# Patient Record
Sex: MALE | Race: WHITE | NOT HISPANIC OR LATINO | Employment: FULL TIME | URBAN - METROPOLITAN AREA
[De-identification: names, ages, dates, MRNs, and addresses within clinical notes are randomized per-mention and may not be internally consistent; named-entity substitution may affect disease eponyms.]

---

## 2023-11-25 ENCOUNTER — HOSPITAL ENCOUNTER (INPATIENT)
Facility: HOSPITAL | Age: 33
LOS: 3 days | Discharge: HOME | DRG: 378 | End: 2023-11-28
Attending: EMERGENCY MEDICINE | Admitting: INTERNAL MEDICINE

## 2023-11-25 DIAGNOSIS — K92.2 UPPER GI BLEED: Primary | ICD-10-CM

## 2023-11-25 DIAGNOSIS — R06.02 SHORTNESS OF BREATH: ICD-10-CM

## 2023-11-25 DIAGNOSIS — D64.9 ANEMIA, UNSPECIFIED TYPE: ICD-10-CM

## 2023-11-25 DIAGNOSIS — R00.0 TACHYCARDIA: ICD-10-CM

## 2023-11-25 DIAGNOSIS — D62 ACUTE BLOOD LOSS ANEMIA: ICD-10-CM

## 2023-11-25 LAB
ABO GROUP (TYPE) IN BLOOD: NORMAL
ABO GROUP (TYPE) IN BLOOD: NORMAL
ALBUMIN SERPL BCP-MCNC: 4.1 G/DL (ref 3.4–5)
ALP SERPL-CCNC: 45 U/L (ref 33–120)
ALT SERPL W P-5'-P-CCNC: 29 U/L (ref 10–52)
ANION GAP SERPL CALC-SCNC: 14 MMOL/L (ref 10–20)
ANTIBODY SCREEN: NORMAL
AST SERPL W P-5'-P-CCNC: 10 U/L (ref 9–39)
BILIRUB SERPL-MCNC: 0.3 MG/DL (ref 0–1.2)
BLOOD EXPIRATION DATE: NORMAL
BUN SERPL-MCNC: 13 MG/DL (ref 6–23)
CALCIUM SERPL-MCNC: 9.1 MG/DL (ref 8.6–10.3)
CARDIAC TROPONIN I PNL SERPL HS: 11 NG/L (ref 0–20)
CHLORIDE SERPL-SCNC: 103 MMOL/L (ref 98–107)
CO2 SERPL-SCNC: 21 MMOL/L (ref 21–32)
CREAT SERPL-MCNC: 0.96 MG/DL (ref 0.5–1.3)
DISPENSE STATUS: NORMAL
GFR SERPL CREATININE-BSD FRML MDRD: >90 ML/MIN/1.73M*2
GLUCOSE BLD MANUAL STRIP-MCNC: 123 MG/DL (ref 74–99)
GLUCOSE SERPL-MCNC: 121 MG/DL (ref 74–99)
INR PPP: 1.2 (ref 0.9–1.1)
LIPASE SERPL-CCNC: 23 U/L (ref 9–82)
POTASSIUM SERPL-SCNC: 4.2 MMOL/L (ref 3.5–5.3)
PRODUCT BLOOD TYPE: 5100
PRODUCT CODE: NORMAL
PROT SERPL-MCNC: 6 G/DL (ref 6.4–8.2)
PROTHROMBIN TIME: 13.1 SECONDS (ref 9.8–12.8)
RH FACTOR (ANTIGEN D): NORMAL
RH FACTOR (ANTIGEN D): NORMAL
SODIUM SERPL-SCNC: 134 MMOL/L (ref 136–145)
TSH SERPL-ACNC: 1.58 MIU/L (ref 0.44–3.98)
UNIT ABO: NORMAL
UNIT NUMBER: NORMAL
UNIT RH: NORMAL
UNIT VOLUME: 350
XM INTEP: NORMAL

## 2023-11-25 PROCEDURE — 84443 ASSAY THYROID STIM HORMONE: CPT | Performed by: EMERGENCY MEDICINE

## 2023-11-25 PROCEDURE — 83690 ASSAY OF LIPASE: CPT | Performed by: EMERGENCY MEDICINE

## 2023-11-25 PROCEDURE — 2500000004 HC RX 250 GENERAL PHARMACY W/ HCPCS (ALT 636 FOR OP/ED): Performed by: EMERGENCY MEDICINE

## 2023-11-25 PROCEDURE — 36415 COLL VENOUS BLD VENIPUNCTURE: CPT | Performed by: EMERGENCY MEDICINE

## 2023-11-25 PROCEDURE — 80053 COMPREHEN METABOLIC PANEL: CPT | Performed by: EMERGENCY MEDICINE

## 2023-11-25 PROCEDURE — C9113 INJ PANTOPRAZOLE SODIUM, VIA: HCPCS | Performed by: EMERGENCY MEDICINE

## 2023-11-25 PROCEDURE — 96374 THER/PROPH/DIAG INJ IV PUSH: CPT

## 2023-11-25 PROCEDURE — 85610 PROTHROMBIN TIME: CPT | Performed by: EMERGENCY MEDICINE

## 2023-11-25 PROCEDURE — 82947 ASSAY GLUCOSE BLOOD QUANT: CPT

## 2023-11-25 PROCEDURE — 86901 BLOOD TYPING SEROLOGIC RH(D): CPT | Performed by: EMERGENCY MEDICINE

## 2023-11-25 PROCEDURE — 84484 ASSAY OF TROPONIN QUANT: CPT | Performed by: EMERGENCY MEDICINE

## 2023-11-25 PROCEDURE — 36430 TRANSFUSION BLD/BLD COMPNT: CPT

## 2023-11-25 PROCEDURE — 85060 BLOOD SMEAR INTERPRETATION: CPT | Performed by: EMERGENCY MEDICINE

## 2023-11-25 PROCEDURE — 99291 CRITICAL CARE FIRST HOUR: CPT | Mod: 25 | Performed by: EMERGENCY MEDICINE

## 2023-11-25 PROCEDURE — 86920 COMPATIBILITY TEST SPIN: CPT

## 2023-11-25 PROCEDURE — 99291 CRITICAL CARE FIRST HOUR: CPT | Performed by: EMERGENCY MEDICINE

## 2023-11-25 PROCEDURE — 85027 COMPLETE CBC AUTOMATED: CPT | Performed by: EMERGENCY MEDICINE

## 2023-11-25 PROCEDURE — P9016 RBC LEUKOCYTES REDUCED: HCPCS

## 2023-11-25 PROCEDURE — 2020000001 HC ICU ROOM DAILY

## 2023-11-25 RX ORDER — POTASSIUM CHLORIDE 14.9 MG/ML
20 INJECTION INTRAVENOUS EVERY 6 HOURS PRN
Status: DISCONTINUED | OUTPATIENT
Start: 2023-11-25 | End: 2023-11-27

## 2023-11-25 RX ORDER — MAGNESIUM SULFATE HEPTAHYDRATE 40 MG/ML
4 INJECTION, SOLUTION INTRAVENOUS EVERY 6 HOURS PRN
Status: DISCONTINUED | OUTPATIENT
Start: 2023-11-25 | End: 2023-11-27

## 2023-11-25 RX ORDER — POTASSIUM CHLORIDE 1.5 G/1.58G
40 POWDER, FOR SOLUTION ORAL EVERY 6 HOURS PRN
Status: DISCONTINUED | OUTPATIENT
Start: 2023-11-25 | End: 2023-11-27

## 2023-11-25 RX ORDER — POTASSIUM CHLORIDE 1.5 G/1.58G
20 POWDER, FOR SOLUTION ORAL EVERY 6 HOURS PRN
Status: DISCONTINUED | OUTPATIENT
Start: 2023-11-25 | End: 2023-11-27

## 2023-11-25 RX ORDER — PANTOPRAZOLE SODIUM 40 MG/10ML
40 INJECTION, POWDER, LYOPHILIZED, FOR SOLUTION INTRAVENOUS 2 TIMES DAILY
Status: DISCONTINUED | OUTPATIENT
Start: 2023-11-25 | End: 2023-11-27

## 2023-11-25 RX ORDER — PANTOPRAZOLE SODIUM 40 MG/10ML
80 INJECTION, POWDER, LYOPHILIZED, FOR SOLUTION INTRAVENOUS ONCE
Status: COMPLETED | OUTPATIENT
Start: 2023-11-25 | End: 2023-11-25

## 2023-11-25 RX ORDER — MAGNESIUM SULFATE HEPTAHYDRATE 40 MG/ML
2 INJECTION, SOLUTION INTRAVENOUS EVERY 6 HOURS PRN
Status: DISCONTINUED | OUTPATIENT
Start: 2023-11-25 | End: 2023-11-27

## 2023-11-25 RX ORDER — POTASSIUM CHLORIDE 20 MEQ/1
40 TABLET, EXTENDED RELEASE ORAL EVERY 6 HOURS PRN
Status: DISCONTINUED | OUTPATIENT
Start: 2023-11-25 | End: 2023-11-27

## 2023-11-25 RX ORDER — POTASSIUM CHLORIDE 20 MEQ/1
20 TABLET, EXTENDED RELEASE ORAL EVERY 6 HOURS PRN
Status: DISCONTINUED | OUTPATIENT
Start: 2023-11-25 | End: 2023-11-27

## 2023-11-25 RX ADMIN — SODIUM CHLORIDE 1000 ML: 9 INJECTION, SOLUTION INTRAVENOUS at 18:33

## 2023-11-25 RX ADMIN — PANTOPRAZOLE SODIUM 80 MG: 40 INJECTION, POWDER, FOR SOLUTION INTRAVENOUS at 18:34

## 2023-11-25 SDOH — SOCIAL STABILITY: SOCIAL INSECURITY: DOES ANYONE TRY TO KEEP YOU FROM HAVING/CONTACTING OTHER FRIENDS OR DOING THINGS OUTSIDE YOUR HOME?: NO

## 2023-11-25 SDOH — SOCIAL STABILITY: SOCIAL INSECURITY: WERE YOU ABLE TO COMPLETE ALL THE BEHAVIORAL HEALTH SCREENINGS?: YES

## 2023-11-25 SDOH — SOCIAL STABILITY: SOCIAL INSECURITY: ABUSE: ADULT

## 2023-11-25 SDOH — SOCIAL STABILITY: SOCIAL INSECURITY: ARE THERE ANY APPARENT SIGNS OF INJURIES/BEHAVIORS THAT COULD BE RELATED TO ABUSE/NEGLECT?: NO

## 2023-11-25 SDOH — SOCIAL STABILITY: SOCIAL INSECURITY: HAS ANYONE EVER THREATENED TO HURT YOUR FAMILY OR YOUR PETS?: NO

## 2023-11-25 SDOH — SOCIAL STABILITY: SOCIAL INSECURITY: DO YOU FEEL UNSAFE GOING BACK TO THE PLACE WHERE YOU ARE LIVING?: NO

## 2023-11-25 SDOH — SOCIAL STABILITY: SOCIAL INSECURITY: DO YOU FEEL ANYONE HAS EXPLOITED OR TAKEN ADVANTAGE OF YOU FINANCIALLY OR OF YOUR PERSONAL PROPERTY?: NO

## 2023-11-25 SDOH — SOCIAL STABILITY: SOCIAL INSECURITY: ARE YOU OR HAVE YOU BEEN THREATENED OR ABUSED PHYSICALLY, EMOTIONALLY, OR SEXUALLY BY ANYONE?: NO

## 2023-11-25 SDOH — SOCIAL STABILITY: SOCIAL INSECURITY: HAVE YOU HAD THOUGHTS OF HARMING ANYONE ELSE?: NO

## 2023-11-25 ASSESSMENT — LIFESTYLE VARIABLES
HAVE YOU EVER FELT YOU SHOULD CUT DOWN ON YOUR DRINKING: NO
PRESCIPTION_ABUSE_PAST_12_MONTHS: NO
HAVE PEOPLE ANNOYED YOU BY CRITICIZING YOUR DRINKING: NO
HOW MANY STANDARD DRINKS CONTAINING ALCOHOL DO YOU HAVE ON A TYPICAL DAY: PATIENT DOES NOT DRINK
EVER FELT BAD OR GUILTY ABOUT YOUR DRINKING: NO
HOW OFTEN DO YOU HAVE A DRINK CONTAINING ALCOHOL: NEVER
AUDIT-C TOTAL SCORE: 0
AUDIT-C TOTAL SCORE: 0
HOW OFTEN DO YOU HAVE 6 OR MORE DRINKS ON ONE OCCASION: NEVER
EVER HAD A DRINK FIRST THING IN THE MORNING TO STEADY YOUR NERVES TO GET RID OF A HANGOVER: NO
SKIP TO QUESTIONS 9-10: 1
SUBSTANCE_ABUSE_PAST_12_MONTHS: NO
REASON UNABLE TO ASSESS: NO

## 2023-11-25 ASSESSMENT — PATIENT HEALTH QUESTIONNAIRE - PHQ9
2. FEELING DOWN, DEPRESSED OR HOPELESS: NOT AT ALL
1. LITTLE INTEREST OR PLEASURE IN DOING THINGS: NEARLY EVERY DAY
SUM OF ALL RESPONSES TO PHQ9 QUESTIONS 1 & 2: 3

## 2023-11-25 ASSESSMENT — COGNITIVE AND FUNCTIONAL STATUS - GENERAL
PATIENT BASELINE BEDBOUND: NO
MOBILITY SCORE: 24
DAILY ACTIVITIY SCORE: 24

## 2023-11-25 ASSESSMENT — ACTIVITIES OF DAILY LIVING (ADL)
BATHING: INDEPENDENT
ADEQUATE_TO_COMPLETE_ADL: YES
GROOMING: INDEPENDENT
HEARING - LEFT EAR: FUNCTIONAL
PATIENT'S MEMORY ADEQUATE TO SAFELY COMPLETE DAILY ACTIVITIES?: YES
WALKS IN HOME: INDEPENDENT
TOILETING: INDEPENDENT
DRESSING YOURSELF: INDEPENDENT
JUDGMENT_ADEQUATE_SAFELY_COMPLETE_DAILY_ACTIVITIES: YES
HEARING - RIGHT EAR: FUNCTIONAL
FEEDING YOURSELF: INDEPENDENT
LACK_OF_TRANSPORTATION: NO

## 2023-11-25 ASSESSMENT — PAIN - FUNCTIONAL ASSESSMENT
PAIN_FUNCTIONAL_ASSESSMENT: 0-10
PAIN_FUNCTIONAL_ASSESSMENT: 0-10

## 2023-11-25 ASSESSMENT — COLUMBIA-SUICIDE SEVERITY RATING SCALE - C-SSRS
6. HAVE YOU EVER DONE ANYTHING, STARTED TO DO ANYTHING, OR PREPARED TO DO ANYTHING TO END YOUR LIFE?: NO
1. IN THE PAST MONTH, HAVE YOU WISHED YOU WERE DEAD OR WISHED YOU COULD GO TO SLEEP AND NOT WAKE UP?: NO
2. HAVE YOU ACTUALLY HAD ANY THOUGHTS OF KILLING YOURSELF?: NO

## 2023-11-25 ASSESSMENT — PAIN SCALES - GENERAL
PAINLEVEL_OUTOF10: 0 - NO PAIN
PAINLEVEL_OUTOF10: 0 - NO PAIN

## 2023-11-25 NOTE — ED TRIAGE NOTES
Patient states shortness of breth and fatigue started month ago. Pt  denies vomtiing, diarrhea, or fevers.

## 2023-11-25 NOTE — ED PROVIDER NOTES
HPI   Chief Complaint   Patient presents with    Shortness of Breath       This is a 33-year-old male that presents the emergency room with increased weakness and shortness of breath x1 month.  Patient states that normally is able to go to the gym and workout for 2 hours over the past month, has been dropping less and less because of shortness of breath and fatigue.  He states now when he wakes up in the morning, he tries to walk around to get ready which generally takes him a 15-minute will take him over 45 minutes to an hour except to stop pause and rest in between dressing and walking to the bathroom.  As well as walking to the kitchen.  He tried to go to the gym today and got extremely lightheaded, and ended up here in the emergency room.  The patient is very pale and states has been getting more pale over the past few weeks, and he feels his heart rate pounding really fast in his chest.  He denies any chest pain but does can state that has been having indigestion for the past month and using Pepto-Bismol and his stool has been black and he thought that was secondary to the Pepto-Bismol.  He has no abdominal pain otherwise, no history of ulcers, and no medical problems.  He is drinking a lot of water, and peeing a lot but that is because he is always thirsty.  He has had no weight loss with this thirst.  His dad did have type 2 diabetes.  He denies any headaches or dizziness.  No back pain, no abdominal pain at this time.    Physical history: None  Medications: Pepto-Bismol                          [place] Coma Scale Score: 15                  Patient History   No past medical history on file.  No past surgical history on file.  No family history on file.  Social History     Tobacco Use    Smoking status: Not on file    Smokeless tobacco: Not on file   Substance Use Topics    Alcohol use: Not on file    Drug use: Not on file       Physical Exam   ED Triage Vitals [11/25/23 1744]   Temp Heart Rate Resp BP   36.5  °C (97.7 °F) (!) 144 18 (!) 125/6      SpO2 Temp src Heart Rate Source Patient Position   97 % -- Monitor Sitting      BP Location FiO2 (%)     Right arm --       Physical Exam  Constitutional:       Appearance: He is normal weight.      Comments: Patient appears pale and tachycardic with normal effort of breathing.  No clue small respirations.   HENT:      Head: Normocephalic.   Eyes:      Pupils: Pupils are equal, round, and reactive to light.      Comments: Pale conjunctiva   Cardiovascular:      Rate and Rhythm: Tachycardia present.   Pulmonary:      Effort: Pulmonary effort is normal.      Breath sounds: Normal breath sounds.   Abdominal:      General: Bowel sounds are normal.      Palpations: Abdomen is soft.   Musculoskeletal:         General: Normal range of motion.   Skin:     General: Skin is warm.   Neurological:      General: No focal deficit present.      Mental Status: He is alert.         ED Course & MDM   Diagnoses as of 11/25/23 2007   Upper GI bleed   Acute blood loss anemia   Tachycardia       Medical Decision Making  The Accu-Chek at bedside is 124, therefore this is not uncontrolled diabetes.  Working diagnosis is most likely acute blood loss anemia causing fatigue and shortness of breath and the pale skin discoloration.  The patient has consented to receiving blood, has no Mandaeism issues with receiving blood, and understands the risk factors associated with blood transfusion including pulmonary edema, and infection.    The patient had verbal consent at bedside for blood transfusion, and we obtained electronic consent to.  At a phone call from the lab stating his hemoglobin was 2.3.  Called the blood bank for type and cross for 3 units.  The patient has again consented knowing the risks of blood transfusion including pulmonary edema, and infection as well as transfusion reaction.  He received the first unit of blood and tolerated well without decrease in heart rate from 145, to 125.  He does  feel little bit cold, the blood warmer will be used for the next 2 units of blood.  Each will be infused over 30 minutes.    Discussed with Dr. Bishop, GI on-call recommends further blood transfusion which will be performed in the inpatient side, and any any cause for any change in condition otherwise the plan will be for an EGD.  Agrees with the IV Protonix.    Discussed with Dr. Alexander, critical care, admitted to the ICU.          Procedure  Critical Care    Performed by: Alessandro Russell DO  Authorized by: Alessandro Russell DO    Critical care provider statement:     Critical care time (minutes):  40  Comments:      Critical care time for 40 minutes for complex but decision making, stabilization resuscitation of patient with tachycardia and the onset of acute blood loss anemia with transfusion of 3 units of blood and admission to the ICU as well as discussion with the GI specialist.       Alessandro Russell DO  11/25/23 2009

## 2023-11-25 NOTE — Clinical Note
Pt axox4, heart rate regular sr-st, lungs clear throughout except slight crackles in lower left base. Left AC IV flushed and patent

## 2023-11-25 NOTE — Clinical Note
Patient tolerated the procedure well and is comfortable with no complaints of pain. Vital signs stable. Arousable prior to transport. Patient transported to icu via stretcher. Handoff completed.

## 2023-11-26 ENCOUNTER — APPOINTMENT (OUTPATIENT)
Dept: RADIOLOGY | Facility: HOSPITAL | Age: 33
DRG: 378 | End: 2023-11-26

## 2023-11-26 ENCOUNTER — ANESTHESIA EVENT (OUTPATIENT)
Dept: GASTROENTEROLOGY | Facility: HOSPITAL | Age: 33
DRG: 378 | End: 2023-11-26

## 2023-11-26 ENCOUNTER — APPOINTMENT (OUTPATIENT)
Dept: GASTROENTEROLOGY | Facility: HOSPITAL | Age: 33
DRG: 378 | End: 2023-11-26

## 2023-11-26 ENCOUNTER — ANESTHESIA (OUTPATIENT)
Dept: GASTROENTEROLOGY | Facility: HOSPITAL | Age: 33
DRG: 378 | End: 2023-11-26

## 2023-11-26 PROBLEM — D64.9 ANEMIA: Status: ACTIVE | Noted: 2023-11-26

## 2023-11-26 PROBLEM — R06.02 SHORTNESS OF BREATH: Status: ACTIVE | Noted: 2023-11-26

## 2023-11-26 LAB
ALBUMIN SERPL BCP-MCNC: 3.6 G/DL (ref 3.4–5)
ALP SERPL-CCNC: 37 U/L (ref 33–120)
ALT SERPL W P-5'-P-CCNC: 21 U/L (ref 10–52)
ANION GAP SERPL CALC-SCNC: 11 MMOL/L (ref 10–20)
AST SERPL W P-5'-P-CCNC: 9 U/L (ref 9–39)
BILIRUB SERPL-MCNC: 0.9 MG/DL (ref 0–1.2)
BLOOD EXPIRATION DATE: NORMAL
BLOOD EXPIRATION DATE: NORMAL
BUN SERPL-MCNC: 9 MG/DL (ref 6–23)
CALCIUM SERPL-MCNC: 8.6 MG/DL (ref 8.6–10.3)
CHLORIDE SERPL-SCNC: 107 MMOL/L (ref 98–107)
CO2 SERPL-SCNC: 23 MMOL/L (ref 21–32)
CREAT SERPL-MCNC: 0.86 MG/DL (ref 0.5–1.3)
DACRYOCYTES BLD QL SMEAR: NORMAL
DISPENSE STATUS: NORMAL
DISPENSE STATUS: NORMAL
ERYTHROCYTE [DISTWIDTH] IN BLOOD BY AUTOMATED COUNT: 23.5 % (ref 11.5–14.5)
ERYTHROCYTE [DISTWIDTH] IN BLOOD BY AUTOMATED COUNT: 27.3 % (ref 11.5–14.5)
ERYTHROCYTE [DISTWIDTH] IN BLOOD BY AUTOMATED COUNT: 27.3 % (ref 11.5–14.5)
GFR SERPL CREATININE-BSD FRML MDRD: >90 ML/MIN/1.73M*2
GLUCOSE SERPL-MCNC: 100 MG/DL (ref 74–99)
HCT VFR BLD AUTO: 17.5 % (ref 41–52)
HCT VFR BLD AUTO: 17.8 % (ref 41–52)
HCT VFR BLD AUTO: 20.3 % (ref 41–52)
HCT VFR BLD AUTO: 26.2 % (ref 41–52)
HCT VFR BLD AUTO: 9.2 % (ref 41–52)
HGB BLD-MCNC: 2.3 G/DL (ref 13.5–17.5)
HGB BLD-MCNC: 5.1 G/DL (ref 13.5–17.5)
HGB BLD-MCNC: 5.2 G/DL (ref 13.5–17.5)
HGB BLD-MCNC: 6.1 G/DL (ref 13.5–17.5)
HGB BLD-MCNC: 7.6 G/DL (ref 13.5–17.5)
HYPOCHROMIA BLD QL SMEAR: NORMAL
MAGNESIUM SERPL-MCNC: 2.24 MG/DL (ref 1.6–2.4)
MCH RBC QN AUTO: 15 PG (ref 26–34)
MCH RBC QN AUTO: 20.6 PG (ref 26–34)
MCH RBC QN AUTO: 21.5 PG (ref 26–34)
MCHC RBC AUTO-ENTMCNC: 25 G/DL (ref 32–36)
MCHC RBC AUTO-ENTMCNC: 28.7 G/DL (ref 32–36)
MCHC RBC AUTO-ENTMCNC: 29.7 G/DL (ref 32–36)
MCV RBC AUTO: 60 FL (ref 80–100)
MCV RBC AUTO: 72 FL (ref 80–100)
MCV RBC AUTO: 72 FL (ref 80–100)
NRBC BLD-RTO: 0.7 /100 WBCS (ref 0–0)
OVALOCYTES BLD QL SMEAR: NORMAL
PLATELET # BLD AUTO: 464 X10*3/UL (ref 150–450)
PLATELET # BLD AUTO: 480 X10*3/UL (ref 150–450)
PLATELET # BLD AUTO: 658 X10*3/UL (ref 150–450)
POLYCHROMASIA BLD QL SMEAR: NORMAL
POTASSIUM SERPL-SCNC: 3.8 MMOL/L (ref 3.5–5.3)
PRODUCT BLOOD TYPE: 5100
PRODUCT BLOOD TYPE: 5100
PRODUCT CODE: NORMAL
PRODUCT CODE: NORMAL
PROT SERPL-MCNC: 5.4 G/DL (ref 6.4–8.2)
RBC # BLD AUTO: 1.53 X10*6/UL (ref 4.5–5.9)
RBC # BLD AUTO: 2.42 X10*6/UL (ref 4.5–5.9)
RBC # BLD AUTO: 2.47 X10*6/UL (ref 4.5–5.9)
RBC MORPH BLD: NORMAL
SCHISTOCYTES BLD QL SMEAR: NORMAL
SODIUM SERPL-SCNC: 137 MMOL/L (ref 136–145)
UNIT ABO: NORMAL
UNIT ABO: NORMAL
UNIT NUMBER: NORMAL
UNIT NUMBER: NORMAL
UNIT RH: NORMAL
UNIT RH: NORMAL
UNIT VOLUME: 350
UNIT VOLUME: 350
WBC # BLD AUTO: 10.7 X10*3/UL (ref 4.4–11.3)
WBC # BLD AUTO: 9 X10*3/UL (ref 4.4–11.3)
WBC # BLD AUTO: 9.1 X10*3/UL (ref 4.4–11.3)
XM INTEP: NORMAL
XM INTEP: NORMAL

## 2023-11-26 PROCEDURE — C9113 INJ PANTOPRAZOLE SODIUM, VIA: HCPCS

## 2023-11-26 PROCEDURE — 85018 HEMOGLOBIN: CPT | Performed by: INTERNAL MEDICINE

## 2023-11-26 PROCEDURE — 2020000001 HC ICU ROOM DAILY

## 2023-11-26 PROCEDURE — 2500000004 HC RX 250 GENERAL PHARMACY W/ HCPCS (ALT 636 FOR OP/ED): Performed by: NURSE PRACTITIONER

## 2023-11-26 PROCEDURE — 2500000001 HC RX 250 WO HCPCS SELF ADMINISTERED DRUGS (ALT 637 FOR MEDICARE OP): Performed by: NURSE PRACTITIONER

## 2023-11-26 PROCEDURE — 43235 EGD DIAGNOSTIC BRUSH WASH: CPT

## 2023-11-26 PROCEDURE — 3E0G8GC INTRODUCTION OF OTHER THERAPEUTIC SUBSTANCE INTO UPPER GI, VIA NATURAL OR ARTIFICIAL OPENING ENDOSCOPIC: ICD-10-PCS | Performed by: INTERNAL MEDICINE

## 2023-11-26 PROCEDURE — 2500000004 HC RX 250 GENERAL PHARMACY W/ HCPCS (ALT 636 FOR OP/ED)

## 2023-11-26 PROCEDURE — 82374 ASSAY BLOOD CARBON DIOXIDE: CPT

## 2023-11-26 PROCEDURE — 85027 COMPLETE CBC AUTOMATED: CPT

## 2023-11-26 PROCEDURE — 2500000004 HC RX 250 GENERAL PHARMACY W/ HCPCS (ALT 636 FOR OP/ED): Performed by: INTERNAL MEDICINE

## 2023-11-26 PROCEDURE — 71045 X-RAY EXAM CHEST 1 VIEW: CPT | Performed by: RADIOLOGY

## 2023-11-26 PROCEDURE — 99222 1ST HOSP IP/OBS MODERATE 55: CPT | Performed by: INTERNAL MEDICINE

## 2023-11-26 PROCEDURE — 3700000002 HC GENERAL ANESTHESIA TIME - EACH INCREMENTAL 1 MINUTE

## 2023-11-26 PROCEDURE — A43239 PR EDG TRANSORAL BIOPSY SINGLE/MULTIPLE: Performed by: NURSE ANESTHETIST, CERTIFIED REGISTERED

## 2023-11-26 PROCEDURE — 71045 X-RAY EXAM CHEST 1 VIEW: CPT | Mod: FY

## 2023-11-26 PROCEDURE — 43235 EGD DIAGNOSTIC BRUSH WASH: CPT | Performed by: INTERNAL MEDICINE

## 2023-11-26 PROCEDURE — 36430 TRANSFUSION BLD/BLD COMPNT: CPT

## 2023-11-26 PROCEDURE — 3700000001 HC GENERAL ANESTHESIA TIME - INITIAL BASE CHARGE

## 2023-11-26 PROCEDURE — 2500000004 HC RX 250 GENERAL PHARMACY W/ HCPCS (ALT 636 FOR OP/ED): Performed by: NURSE ANESTHETIST, CERTIFIED REGISTERED

## 2023-11-26 PROCEDURE — 36415 COLL VENOUS BLD VENIPUNCTURE: CPT

## 2023-11-26 PROCEDURE — 2720000007 HC OR 272 NO HCPCS

## 2023-11-26 PROCEDURE — P9016 RBC LEUKOCYTES REDUCED: HCPCS

## 2023-11-26 PROCEDURE — A43239 PR EDG TRANSORAL BIOPSY SINGLE/MULTIPLE: Performed by: ANESTHESIOLOGY

## 2023-11-26 PROCEDURE — 83735 ASSAY OF MAGNESIUM: CPT

## 2023-11-26 PROCEDURE — 0DJ08ZZ INSPECTION OF UPPER INTESTINAL TRACT, VIA NATURAL OR ARTIFICIAL OPENING ENDOSCOPIC: ICD-10-PCS | Performed by: INTERNAL MEDICINE

## 2023-11-26 PROCEDURE — 99291 CRITICAL CARE FIRST HOUR: CPT | Performed by: INTERNAL MEDICINE

## 2023-11-26 RX ORDER — EPINEPHRINE 0.1 MG/ML
INJECTION INTRACARDIAC; INTRAVENOUS AS NEEDED
Status: COMPLETED | OUTPATIENT
Start: 2023-11-26 | End: 2023-11-26

## 2023-11-26 RX ORDER — FENTANYL CITRATE 50 UG/ML
INJECTION, SOLUTION INTRAMUSCULAR; INTRAVENOUS AS NEEDED
Status: DISCONTINUED | OUTPATIENT
Start: 2023-11-26 | End: 2023-11-26

## 2023-11-26 RX ORDER — FUROSEMIDE 10 MG/ML
20 INJECTION INTRAMUSCULAR; INTRAVENOUS ONCE
Status: DISCONTINUED | OUTPATIENT
Start: 2023-11-26 | End: 2023-11-27

## 2023-11-26 RX ORDER — POLYETHYLENE GLYCOL 3350 17 G/17G
238 POWDER, FOR SOLUTION ORAL ONCE AS NEEDED
Status: COMPLETED | OUTPATIENT
Start: 2023-11-26 | End: 2023-11-26

## 2023-11-26 RX ORDER — MIDAZOLAM HYDROCHLORIDE 1 MG/ML
INJECTION, SOLUTION INTRAMUSCULAR; INTRAVENOUS AS NEEDED
Status: DISCONTINUED | OUTPATIENT
Start: 2023-11-26 | End: 2023-11-26

## 2023-11-26 RX ORDER — FUROSEMIDE 10 MG/ML
40 INJECTION INTRAMUSCULAR; INTRAVENOUS ONCE
Status: DISCONTINUED | OUTPATIENT
Start: 2023-11-26 | End: 2023-11-26

## 2023-11-26 RX ORDER — PROPOFOL 10 MG/ML
INJECTION, EMULSION INTRAVENOUS AS NEEDED
Status: DISCONTINUED | OUTPATIENT
Start: 2023-11-26 | End: 2023-11-26

## 2023-11-26 RX ORDER — PROPOFOL 10 MG/ML
INJECTION, EMULSION INTRAVENOUS CONTINUOUS PRN
Status: DISCONTINUED | OUTPATIENT
Start: 2023-11-26 | End: 2023-11-26

## 2023-11-26 RX ORDER — BISACODYL 5 MG
10 TABLET, DELAYED RELEASE (ENTERIC COATED) ORAL EVERY 8 HOURS
Status: COMPLETED | OUTPATIENT
Start: 2023-11-26 | End: 2023-11-27

## 2023-11-26 RX ADMIN — FENTANYL CITRATE 50 MCG: 50 INJECTION, SOLUTION INTRAMUSCULAR; INTRAVENOUS at 08:18

## 2023-11-26 RX ADMIN — PANTOPRAZOLE SODIUM 40 MG: 40 INJECTION, POWDER, FOR SOLUTION INTRAVENOUS at 20:53

## 2023-11-26 RX ADMIN — PROPOFOL 200 MCG/KG/MIN: 10 INJECTION, EMULSION INTRAVENOUS at 08:18

## 2023-11-26 RX ADMIN — BISACODYL 10 MG: 5 TABLET, COATED ORAL at 17:29

## 2023-11-26 RX ADMIN — POLYETHYLENE GLYCOL 3350 238 G: 17 POWDER, FOR SOLUTION ORAL at 17:29

## 2023-11-26 RX ADMIN — SODIUM CHLORIDE, SODIUM LACTATE, POTASSIUM CHLORIDE, AND CALCIUM CHLORIDE: 600; 310; 30; 20 INJECTION, SOLUTION INTRAVENOUS at 08:05

## 2023-11-26 RX ADMIN — PROPOFOL 20 MG: 10 INJECTION, EMULSION INTRAVENOUS at 08:24

## 2023-11-26 RX ADMIN — PANTOPRAZOLE SODIUM 40 MG: 40 INJECTION, POWDER, FOR SOLUTION INTRAVENOUS at 09:00

## 2023-11-26 RX ADMIN — PROPOFOL 70 MG: 10 INJECTION, EMULSION INTRAVENOUS at 08:18

## 2023-11-26 RX ADMIN — EPINEPHRINE 0.2 MG: 0.1 INJECTION INTRACARDIAC; INTRAVENOUS at 08:29

## 2023-11-26 RX ADMIN — MIDAZOLAM 2 MG: 1 INJECTION INTRAMUSCULAR; INTRAVENOUS at 08:18

## 2023-11-26 SDOH — HEALTH STABILITY: MENTAL HEALTH: CURRENT SMOKER: 0

## 2023-11-26 ASSESSMENT — ENCOUNTER SYMPTOMS
SPEECH DIFFICULTY: 0
NAUSEA: 0
DIARRHEA: 0
EYE DISCHARGE: 0
EYE PAIN: 0
CHEST TIGHTNESS: 1
EYE REDNESS: 0
PALPITATIONS: 0
HALLUCINATIONS: 0
SHORTNESS OF BREATH: 1
VOMITING: 0
FLANK PAIN: 0
SLEEP DISTURBANCE: 0
HEMATURIA: 0
ADENOPATHY: 0
BRUISES/BLEEDS EASILY: 0
LIGHT-HEADEDNESS: 1
DIZZINESS: 0
SEIZURES: 0
BACK PAIN: 1
FEVER: 0
MYALGIAS: 0
VOICE CHANGE: 0
ABDOMINAL PAIN: 0
TROUBLE SWALLOWING: 0
CONSTIPATION: 0
UNEXPECTED WEIGHT CHANGE: 0

## 2023-11-26 ASSESSMENT — COGNITIVE AND FUNCTIONAL STATUS - GENERAL
MOBILITY SCORE: 24
DAILY ACTIVITIY SCORE: 24

## 2023-11-26 ASSESSMENT — PAIN SCALES - GENERAL
PAIN_LEVEL: 0
PAINLEVEL_OUTOF10: 0 - NO PAIN

## 2023-11-26 ASSESSMENT — PAIN - FUNCTIONAL ASSESSMENT: PAIN_FUNCTIONAL_ASSESSMENT: 0-10

## 2023-11-26 NOTE — ANESTHESIA POSTPROCEDURE EVALUATION
Patient: Daryl Win    Procedure Summary       Date: 11/26/23 Room / Location: [facility] [place]    Anesthesia Start: 0813 Anesthesia Stop: 0844    Procedure: EGD Diagnosis:       Upper GI bleed      Acute blood loss anemia    Scheduled Providers:  Responsible Provider: Kingsley Tinsley MD    Anesthesia Type: MAC ASA Status: 3            Anesthesia Type: MAC    Vitals Value Taken Time   BP 93/70 11/26/23 0841   Temp 36.8 11/26/23 0853   Pulse 116 11/26/23 0851   Resp 33 11/26/23 0851   SpO2 91 % 11/26/23 0851   Vitals shown include unvalidated device data.    Anesthesia Post Evaluation    Patient location during evaluation: ICU  Patient participation: complete - patient participated  Level of consciousness: sleepy but conscious and awake  Pain score: 0  Pain management: adequate  Airway patency: patent  Cardiovascular status: acceptable  Respiratory status: face mask and spontaneous ventilation (Tachypneic at baseline for patient)  Hydration status: acceptable  Postoperative Nausea and Vomiting: none  Comments: Handoff to ICU RN>        No notable events documented.

## 2023-11-26 NOTE — CARE PLAN
The patient's goals for the shift include      The clinical goals for the shift include room air    Over the shift, the patient did not make progress toward the following goals. Barriers to progression include ***. Recommendations to address these barriers include ***.

## 2023-11-26 NOTE — CONSULTS
Reason For Consult  Anemia, melena    History Of Present Illness  Daryl Win is a 33 y.o. male presenting with dyspnea on exertion for the past month, followed by melena for approximately 2 weeks.  He also complains of pale skin.  He noticed dyspnea on exertion while at the gym for the past month, and this has progressed gradually to dyspnea with walking across the room.  He does take Pepto-Bismol as needed for heartburn 1-2 times per week.  He reports having heartburn all his life.  He has never taken a prescription for this.  He denies weight loss, nausea, dysphagia and vomiting.  He denies abdominal pain.  He takes Advil as needed for back pain.  He took Advil 3-4 times per day for 2 to 3 days straight recently.  He has never had an EGD or colonoscopy.    His hemoglobin was 2 on admission to the ER.  He was transfused 3 units, and a 4th unit had to be stopped due to pulmonary edema.  His hemoglobin at last check was 5.1.     Past Medical History  None    Surgical History  None     Social History  He reports that he has never smoked. He has never used smokeless tobacco. He reports that he does not drink alcohol and does not use drugs.    Family History  Denies CRC  Father metastatic stomach cancer age 62     Allergies  Patient has no known allergies.    Review of Systems  Review of Systems   Constitutional:  Negative for fever and unexpected weight change.   HENT:  Negative for trouble swallowing and voice change.    Eyes:  Negative for pain, discharge and redness.   Respiratory:  Positive for chest tightness and shortness of breath.    Cardiovascular:  Negative for palpitations and leg swelling.   Gastrointestinal:  Negative for abdominal pain, constipation, diarrhea, nausea and vomiting.   Endocrine: Negative for cold intolerance and heat intolerance.   Genitourinary:  Negative for flank pain and hematuria.   Musculoskeletal:  Positive for back pain. Negative for myalgias.   Skin:  Positive for pallor.  "Negative for rash.   Neurological:  Positive for light-headedness. Negative for dizziness, seizures, syncope and speech difficulty.   Hematological:  Negative for adenopathy. Does not bruise/bleed easily.   Psychiatric/Behavioral:  Negative for hallucinations, sleep disturbance and suicidal ideas.           Physical Exam  General appearance: No acute distress, cooperative, pale.  Eyes: Nonicteric, no redness or proptosis  Ears, nose, mouth, and throat: Moist mucous membranes, tongue normal; no oral lesions; Mallampati 2  Neck: Supple, no lymphadenopathy or thyromegaly  Lungs: Clear to auscultation bilaterally  CV: Regular rate and rhythm, no murmur; no pitting edema in the lower extremities  Abd: soft, non-tender; non-distended; normal active bowel sounds; no scars  Skin: No rashes or lesions; no liver stigmata  MSK: No deformities or joint edema/redness/tenderness  Neuro: Alert and oriented ×3, normal gait       Last Recorded Vitals  Blood pressure 131/75, pulse (!) 114, temperature 37.1 °C (98.8 °F), temperature source Temporal, resp. rate 25, height 1.778 m (5' 10\"), weight 90.2 kg (198 lb 13.7 oz), SpO2 94 %.    Relevant Results    Component      Latest Ref Rng 11/25/2023 11/26/2023   WBC      4.4 - 11.3 x10*3/uL 10.7  9.1    WBC        9.0    nRBC      0.0 - 0.0 /100 WBCs 0.7 (H)  0.7 (H)    nRBC        0.7 (H)    RBC      4.50 - 5.90 x10*6/uL 1.53 (L)  2.47 (L)    RBC        2.42 (L)    HEMOGLOBIN      13.5 - 17.5 g/dL 2.3 (LL)  5.1 (LL)    HEMOGLOBIN        5.2 (LL)    HEMATOCRIT      41.0 - 52.0 % 9.2 (L)  17.8 (L)    HEMATOCRIT        17.5 (L)    MCV      80 - 100 fL 60 (L)  72 (L)    MCV        72 (L)    MCH      26.0 - 34.0 pg 15.0 (L)  20.6 (L)    MCH        21.5 (L)    MCHC      32.0 - 36.0 g/dL 25.0 (L)  28.7 (L)    MCHC        29.7 (L)    RED CELL DISTRIBUTION WIDTH      11.5 - 14.5 % 23.5 (H)  27.3 (H)    RED CELL DISTRIBUTION WIDTH        27.3 (H)    Platelets      150 - 450 x10*3/uL 658 (H)  480 " (H)    Platelets        464 (H)       BUN/Cr 9/1  INR 1.2    CXR: hiatal hernia     Assessment/Plan   33-year-old otherwise healthy male who presents with melena, and profound blood loss anemia likely secondary to peptic ulcer disease.  He is normotensive, but still tachycardic.  Hb 2-> 5 after 3u transfusion.    -agree with PPI bid  -stat h/h  -EGD as soon as can be arranged          Dileep Bishop MD

## 2023-11-26 NOTE — NURSING NOTE
2100 PT ARRIVED FROM ER ALERT AND ORIENTED X4 ON 3L O2 NC, GIB WITH HGB 2.3 SECOND UNIT PRBCS INFUSING.     2249 THIRD UNIT PRBCS STARTED.    HGB RECHECK AFTER THREE UNITS WAS 5.2. POX ON 3L O2 NC DIPS TO 89-91%. O2 INCREASED TO 4L. POX UP TO MID NINETIES. LUNG SOUNDS REMAIN CLEAR. DR ISABEL AWARE.  DR ISABEL ORDERED FOURTH UNIT TO BE GIVEN.     0349 4TH UNIT STARTED AT 100ML/HOUR THEN INCREASED TO 150ML/HR AFTER 15MINUTES.    0600 PT WITH POX 89-90%. DR ISABEL AWARE.     0615 BLOOD STOPPED. CHEST XRAY ORDERED.    0640 DR ISABEL ORDERED LASIX BUT WANTS IT HELD UNTIL OFFICIAL READ COMES BACK.    0650 DR KATE GI IN AND UPDATED ON PT LABS AND BLOOD INFUSIONS. HE SPOKE WITH SHONNA PT AND DR ISABEL.  PT REMAINS ALERT AND ORIENTED AND PLEASANT WITH CARE.

## 2023-11-26 NOTE — ANESTHESIA PREPROCEDURE EVALUATION
Patient: Daryl Win    Procedure Information       Date/Time: 11/26/23 0750    Procedure: EGD    Location: [facility]            Relevant Problems   GI   (+) Upper GI bleed      Hematology   (+) Anemia       Clinical information reviewed:   Tobacco  Allergies  Meds   Med Hx  Surg Hx   Fam Hx  Soc Hx        NPO Detail:  No data recorded     Physical Exam    Airway  Mallampati: II  TM distance: >3 FB  Neck ROM: full     Cardiovascular   Rhythm: regular  Rate: normal     Dental - normal exam     Pulmonary   Breath sounds clear to auscultation     Abdominal            Anesthesia Plan    ASA 3     MAC     The patient is not a current smoker.    intravenous induction   Anesthetic plan and risks discussed with patient.    Plan discussed with CRNA.    Discussed risks benefits and alternatives,patient agrees to proceed.

## 2023-11-26 NOTE — H&P
33-year-old male, otherwise healthy, comes emergency room today for tachycardia, increasing pale skin, shortness of breath on exertion and chest tightness.  He is having symptoms going on like this for a month that are progressively been getting worse.  He said he been having intermittent black stools but was taking Pepto-Bismol intermittently for abdominal discomfort.  He said he is getting more shortness of breath with even less exertion over time and that is why he came in today along with the fact that he is becoming increasingly pale.  No hematemesis.  Currently no nausea or vomiting, fevers or chills.  No chest pain or shortness of breath at rest currently.  No abdominal pain, frequency, urgency, hematuria, dysuria, epistaxis or any other symptoms currently.    ED course: Patient presented to the ER hypertensive, tachycardic.  CBC showed a hemoglobin of 2.3, platelet count of 658, normal white blood cell count.  Chemistry panel was largely unremarkable.  Coagulation studies were within normal limits.  Troponin was normal.  TSH was within normal limits as well.  Patient was given 3 units packed red blood cells, 80 milligrams IV Protonix.  GI was consulted.  Recommended resuscitation.    Past medical history: Denies  Past surgical history: Denies  Allergies: Denies  Social history: No regular drug, tobacco or alcohol use.    Physical Exam:    GENERAL: Well developed, well appearing adult in no acute distress.   NEURO: Alert and oriented. Moves all extremities. Face is symmetric and expressive.   EYES: Pale sclera.  HENT: Normocephalic, atraumatic. Hearing is grossly intact. Nares grossly patent and without discharge. Mucous membranes moist.   NECK: No JVD. Patient moves neck without restriction.   CARDIO: Rhythm regular.  Tachycardic rate. No murmur, rub, or gallop. Pulses equal bilaterally in the upper and lower extremity. No lower extremity edema  PULM: Lungs clear to auscultation in all fields. No wheezes,  rales, or rhonchi. No conversational dyspnea. No splinting, stridor, or accessory muscle use.    GI/: Abdomen is soft and non-tender.  No palpable masses.   EXTREMITIES: Symmetric muscle bulk.  No clubbing, cyanosis, or deformity. No calf tenderness  SKIN: Pale skin  PSYCH: Mood, affect, and interaction is appropriate to the setting.      Assessment:    1.  Chronic blood loss anemia secondary to likely slow GI bleeding      Plan:    Neuro: Maintain standard ICU CAM protocol for ICU delirium.  No other acute neurological intervention required, patient is mentating at his baseline.  He is not anxious, not altered.    Cardio: Patient is tachycardic, will continue resuscitation, keep blood pressure MAP greater than 65.  Resuscitate with blood as needed.  Keep K above 4, mag above 2, Phos above 3.5.    Pulm: No acute intervention required    GI: Patient likely has chronic blood loss anemia secondary to slow GI bleeding.  GI consulted, will see patient in the morning.  Will make patient n.p.o. for possible endoscopy tomorrow.  We will resuscitate blood products as needed.  Will consult GI if patient starts to destabilize despite continuous resuscitation.  Patient given 80 mg IV Protonix in the ER, will order 40 mg twice daily Protonix.     : No acute issues.  We will continue to monitor urine output, electrolytes and kidney function in the morning labs.    Heme: Patient does have a hemoglobin of 2.3, hematocrit 9.2.  Suspect this is likely chronic blood loss anemia.  Platelet count is elevated 658, I do suspect this is likely secondary to hemoconcentration of platelets.  We will transfuse blood as needed after initial 3 units.  We will hold all anticoagulation at this time given patient is having internal bleeding.  We will recheck lab work after transfusion.    Endo: No acute issues    Skin: No acute issues, standard wound care orders per nursing if required    ID: No acute intervention.    Dispo: patient requires  ICU level of care for significant anemia.  CCM will continue to follow.  GI consult in the morning.    Álvaro Vergara DO  PGY-3  Emergency Medicine

## 2023-11-26 NOTE — PROGRESS NOTES
Critical Care Daily Progress        Subjective   Patient is a 33 y.o. male admitted on 11/25/2023  6:08 PM with the following indication(s) for ICU care for Chronic GIB.     Interval History: Patient is a 33-year-old male, otherwise healthy, reported to the emergency room n 11/25 for tachycardia, increasing pale skin, shortness of breath on exertion and chest tightness.  He reported that his symptoms have been going on for a month and have progressively been getting worse.  He said he also had been having intermittent black stools but was taking Pepto-Bismol intermittently for abdominal discomfort.  He said he is getting more shortness of breath with even less exertion over time and that is why he came in today along with the fact that he is becoming increasingly pale.  Upon presenting to the ED, he stated that he had no hematemesis, nausea, vomiting, fevers or chills.  No chest pain or shortness of breath at rest currently.  No abdominal pain, frequency, urgency, hematuria, dysuria, epistaxis. While in the ED, he was noted to the be hypertensive, tachycardiac and his CBC showed a hemoglobin of 2.3. Patient was given 3 units of pRBC, Protonix and transferred to the ICU. A repeat CBC in ICU showed a hemoglobin of 5.1. He was started on an additional transfusion but developed a drop in his oxygen saturation. Transfusion was immediately stopped. A follow up chest x-ray was unremarkable. Patient also underwent an EGD which showed a hiatal hernia and possible alexandre britt tear at the GE junction.     While examining the patient this morning, patient states he has no complaints post procedure. He denies any headaches, vision changes, fevers, chills, chest pain, SOB, abdominal pain, nausea, vomiting or diarrhea.     Complaints: has none..     Scheduled Medications:   bisacodyl, 10 mg, oral, q8h  furosemide, 20 mg, intravenous, Once  pantoprazole, 40 mg, intravenous, BID         Continuous Medications:         PRN  Medications:   PRN medications: EPINEPHrine, magnesium sulfate, magnesium sulfate, potassium chloride CR **OR** potassium chloride, potassium chloride CR **OR** potassium chloride, potassium chloride, potassium chloride    Objective   Vitals:  Most Recent:  Vitals:    11/26/23 1700   BP: 128/86   Pulse: 110   Resp: (!) 35   Temp:    SpO2: 96%       24hr Min/Max:  Temp  Min: 36.6 °C (97.9 °F)  Max: 37.5 °C (99.5 °F)  Pulse  Min: 106  Max: 138  BP  Min: 99/62  Max: 151/74  Resp  Min: 5  Max: 46  SpO2  Min: 89 %  Max: 100 %    LDA:         Vent settings:       Hemodynamic parameters for last 24 hours:       I/O:    Intake/Output Summary (Last 24 hours) at 11/26/2023 1808  Last data filed at 11/26/2023 1700  Gross per 24 hour   Intake 2055.83 ml   Output 4050 ml   Net -1994.17 ml       Physical Exam:     GENERAL: Well developed, well appearing adult in no acute distress.   NEURO: Alert and oriented. Moves all extremities. Face is symmetric and expressive.   EYES: Pale sclera.  HENT: Normocephalic, atraumatic. Hearing is grossly intact. Nares grossly patent and without discharge. Mucous membranes moist.   NECK: No JVD. Patient moves neck without restriction.   CARDIO: Rhythm regular.  Tachycardic rate. No murmur, rub, or gallop. Pulses equal bilaterally in the upper and lower extremity. No lower extremity edema  PULM: Lungs clear to auscultation in all fields. No wheezes, rales, or rhonchi. No conversational dyspnea. No splinting, stridor, or accessory muscle use.    GI/: Abdomen is soft and non-tender.  No palpable masses.   EXTREMITIES: Symmetric muscle bulk.  No clubbing, cyanosis, or deformity. No calf tenderness  SKIN: Pale skin  PSYCH: Mood, affect, and interaction is appropriate to the setting.    Lab/Radiology/Diagnostic Review:  Results for orders placed or performed during the hospital encounter of 11/25/23 (from the past 24 hour(s))   Type and Screen   Result Value Ref Range    ABO TYPE O     Rh TYPE POS      ANTIBODY SCREEN NEG    CBC   Result Value Ref Range    WBC 10.7 4.4 - 11.3 x10*3/uL    nRBC 0.7 (H) 0.0 - 0.0 /100 WBCs    RBC 1.53 (L) 4.50 - 5.90 x10*6/uL    Hemoglobin 2.3 (LL) 13.5 - 17.5 g/dL    Hematocrit 9.2 (L) 41.0 - 52.0 %    MCV 60 (L) 80 - 100 fL    MCH 15.0 (L) 26.0 - 34.0 pg    MCHC 25.0 (L) 32.0 - 36.0 g/dL    RDW 23.5 (H) 11.5 - 14.5 %    Platelets 658 (H) 150 - 450 x10*3/uL   Comprehensive metabolic panel   Result Value Ref Range    Glucose 121 (H) 74 - 99 mg/dL    Sodium 134 (L) 136 - 145 mmol/L    Potassium 4.2 3.5 - 5.3 mmol/L    Chloride 103 98 - 107 mmol/L    Bicarbonate 21 21 - 32 mmol/L    Anion Gap 14 10 - 20 mmol/L    Urea Nitrogen 13 6 - 23 mg/dL    Creatinine 0.96 0.50 - 1.30 mg/dL    eGFR >90 >60 mL/min/1.73m*2    Calcium 9.1 8.6 - 10.3 mg/dL    Albumin 4.1 3.4 - 5.0 g/dL    Alkaline Phosphatase 45 33 - 120 U/L    Total Protein 6.0 (L) 6.4 - 8.2 g/dL    AST 10 9 - 39 U/L    Bilirubin, Total 0.3 0.0 - 1.2 mg/dL    ALT 29 10 - 52 U/L   Lipase   Result Value Ref Range    Lipase 23 9 - 82 U/L   Protime-INR   Result Value Ref Range    Protime 13.1 (H) 9.8 - 12.8 seconds    INR 1.2 (H) 0.9 - 1.1   Troponin I, High Sensitivity   Result Value Ref Range    Troponin I, High Sensitivity 11 0 - 20 ng/L   TSH with reflex to Free T4 if abnormal   Result Value Ref Range    Thyroid Stimulating Hormone 1.58 0.44 - 3.98 mIU/L   Morphology   Result Value Ref Range    RBC Morphology See Below     Polychromasia Mild     Hypochromia Marked     RBC Fragments Many     Ovalocytes Few     Teardrop Cells Few    Prepare RBC: 1 Units   Result Value Ref Range    PRODUCT CODE P6894K95     Unit Number [id number]     Unit ABO O     Unit RH POS     XM INTEP COMP     Dispense Status IS     Blood Expiration Date December 26, 2023 23:59 EST     PRODUCT BLOOD TYPE 5100     UNIT VOLUME 350    VERIFY ABO/Rh Group Test   Result Value Ref Range    ABO TYPE O     Rh TYPE POS    Prepare RBC: 1 Units   Result Value Ref Range     PRODUCT CODE G8580H73     Unit Number [id number]     Unit ABO O     Unit RH POS     XM INTEP COMP     Dispense Status IS     Blood Expiration Date December 25, 2023 23:59 EST     PRODUCT BLOOD TYPE 5100     UNIT VOLUME 350    Prepare RBC: 1 Units   Result Value Ref Range    PRODUCT CODE S7665Z45     Unit Number [id number]     Unit ABO O     Unit RH POS     XM INTEP COMP     Dispense Status TR     Blood Expiration Date December 25, 2023 23:59 EST     PRODUCT BLOOD TYPE 5100     UNIT VOLUME 350    Prepare RBC: 1 Units   Result Value Ref Range    PRODUCT CODE N6309C41     Unit Number [id number]     Unit ABO O     Unit RH POS     XM INTEP COMP     Dispense Status IS     Blood Expiration Date December 18, 2023 23:59 EST     PRODUCT BLOOD TYPE 5100     UNIT VOLUME 350    CBC   Result Value Ref Range    WBC 9.0 4.4 - 11.3 x10*3/uL    nRBC 0.7 (H) 0.0 - 0.0 /100 WBCs    RBC 2.42 (L) 4.50 - 5.90 x10*6/uL    Hemoglobin 5.2 (LL) 13.5 - 17.5 g/dL    Hematocrit 17.5 (L) 41.0 - 52.0 %    MCV 72 (L) 80 - 100 fL    MCH 21.5 (L) 26.0 - 34.0 pg    MCHC 29.7 (L) 32.0 - 36.0 g/dL    RDW 27.3 (H) 11.5 - 14.5 %    Platelets 464 (H) 150 - 450 x10*3/uL   CBC   Result Value Ref Range    WBC 9.1 4.4 - 11.3 x10*3/uL    nRBC 0.7 (H) 0.0 - 0.0 /100 WBCs    RBC 2.47 (L) 4.50 - 5.90 x10*6/uL    Hemoglobin 5.1 (LL) 13.5 - 17.5 g/dL    Hematocrit 17.8 (L) 41.0 - 52.0 %    MCV 72 (L) 80 - 100 fL    MCH 20.6 (L) 26.0 - 34.0 pg    MCHC 28.7 (L) 32.0 - 36.0 g/dL    RDW 27.3 (H) 11.5 - 14.5 %    Platelets 480 (H) 150 - 450 x10*3/uL   Comprehensive metabolic panel   Result Value Ref Range    Glucose 100 (H) 74 - 99 mg/dL    Sodium 137 136 - 145 mmol/L    Potassium 3.8 3.5 - 5.3 mmol/L    Chloride 107 98 - 107 mmol/L    Bicarbonate 23 21 - 32 mmol/L    Anion Gap 11 10 - 20 mmol/L    Urea Nitrogen 9 6 - 23 mg/dL    Creatinine 0.86 0.50 - 1.30 mg/dL    eGFR >90 >60 mL/min/1.73m*2    Calcium 8.6 8.6 - 10.3 mg/dL    Albumin 3.6 3.4 - 5.0  g/dL    Alkaline Phosphatase 37 33 - 120 U/L    Total Protein 5.4 (L) 6.4 - 8.2 g/dL    AST 9 9 - 39 U/L    Bilirubin, Total 0.9 0.0 - 1.2 mg/dL    ALT 21 10 - 52 U/L   Magnesium   Result Value Ref Range    Magnesium 2.24 1.60 - 2.40 mg/dL   Hemoglobin and hematocrit, blood   Result Value Ref Range    Hemoglobin 6.1 (LL) 13.5 - 17.5 g/dL    Hematocrit 20.3 (L) 41.0 - 52.0 %   Prepare RBC: 1 Units   Result Value Ref Range    PRODUCT CODE Z0762C10     Unit Number [id number]     Unit ABO O     Unit RH POS     XM INTEP COMP     Dispense Status TR     Blood Expiration Date December 19, 2023 23:59 EST     PRODUCT BLOOD TYPE 5100     UNIT VOLUME 350    Hemoglobin and Hematocrit   Result Value Ref Range    Hemoglobin 7.6 (L) 13.5 - 17.5 g/dL    Hematocrit 26.2 (L) 41.0 - 52.0 %     EGD    Result Date: 11/26/2023  Table formatting from the original result was not included. Impression The duodenal bulb and 2nd part of the duodenum appeared normal. 10 cm type I hiatal hernia with Bulmaro lesions present Findings The duodenal bulb and 2nd part of the duodenum appeared normal. There were significant Bulmaro lesions in the proximal stomach without bleeding.  The stomach was otherwise normal. 10 cm sliding hiatal hernia (type I hiatal hernia) with Bulmaro lesions present, confirmed by retroflexion Possible Samantha-Rich tear at the GEJ on withdrawal.  There was no bleeding on insertion.  Injected with 1.5 ml of Epi with control of bleeding. Recommendation  Follow up with PCP      Return to [facility] for [facility] care      He will need a colonoscopy for his profound anemia Indication Acute blood loss anemia, Upper GI bleed Post Procedure Diagnosis Large hiatal hernia with Bulmaro lesions Staff Staff Role No Staff Documented Medications See Anesthesia Record. Preprocedure A history and physical has been performed, and patient medication allergies have been reviewed. The patient's tolerance of previous anesthesia has been reviewed.  The risks and benefits of the procedure and the sedation options and risks were discussed with the patient. All questions were answered and informed consent obtained. Details of the Procedure The patient underwent monitored anesthesia care, which was administered by an anesthesia professional. The patient's blood pressure, level of consciousness, oxygen, respirations, heart rate, ECG and ETCO2 were monitored throughout the procedure. The scope was introduced through the mouth and advanced to the second part of the duodenum. Retroflexion was performed in the cardia. The patient's estimated blood loss was minimal (<5 mL). The procedure was not difficult. The patient tolerated the procedure well. There were no apparent adverse events. Events Procedure Events Event Event Time ENDO SCOPE IN TIME 11/26/2023  8:19 AM ENDO SCOPE OUT TIME 11/26/2023  8:28 AM Specimens No specimens collected Procedure Location [facility] [address] [facility] [id number] Referring Provider No referring provider defined for this encounter. Procedure Provider No name on file     XR chest 1 view    Result Date: 11/26/2023  Interpreted By:  Anton Honeycutt, STUDY: XR CHEST 1 VIEW;  11/26/2023 5:56 am   INDICATION: Signs/Symptoms:hypoxia.   COMPARISON: None.   ACCESSION NUMBER(S): [id number]   ORDERING CLINICIAN: MAAME CHANDLER   FINDINGS: AP portable view of the chest is obtained.  Limited exam due to portable nature. Magnified cardiac silhouette. No infiltrates, effusions or pneumothorax. Retrocardiac hiatal hernia.       1.  No evidence of acute cardiopulmonary process. Likely a hiatal hernia.       MACRO: None   Signed by: Anton Honeycutt 11/26/2023 7:20 AM Dictation workstation:   [id number]               Assessment/Plan   Principal Problem:    Upper GI bleed  Active Problems:    Anemia    Shortness of breath    1.  Chronic blood loss anemia secondary to likely slow GI bleeding         Plan:     Neuro: Maintain standard ICU CAM protocol for ICU delirium.  No other acute neurological intervention required, patient is mentating at his baseline.  He is not anxious, not altered.     Cardio: Patient is tachycardic, will continue resuscitation, keep blood pressure MAP greater than 65.  Resuscitate with blood as needed.  Keep K above 4, mag above 2, Phos above 3.5.     Pulm: No acute intervention required     GI: Patient likely has chronic blood loss anemia secondary to slow GI bleeding.    We will resuscitate blood products as needed.  Will consult GI if patient starts to destabilize despite continuous resuscitation.  Patient given 80 mg IV Protonix in the ER, will order 40 mg twice daily Protonix.     EGD completed today shows hiatal hernia and possibly alexandre-britt tear at the GE junction.       : No acute issues.  We will continue to monitor urine output, electrolytes and kidney function in the morning labs.     Heme: Patient has a current hemoglobin of 7.6 , hematocrit 26.2. Significant improvement from early presentation. Patient has been given 4 complete units of pRBC.  We will transfuse blood as needed after initial 4 units.  We will hold all anticoagulation at this time given patient having recent internal bleeding.  We will recheck CBC q12.      Endo: No acute issues     Skin: No acute issues, standard wound care orders per nursing if required     ID: No acute intervention.     Dispo: patient will remain in the ICU until tachycardia resolves and patient is stable. We will continue to monitor H/H and transfuse as needed. Anticipate that the patient will be downgraded in the AM.     The assessment and plan were discussed with the senior resident and attending physician,    Martha Swift MD  Emergency Medicine, PGY-1

## 2023-11-27 ENCOUNTER — APPOINTMENT (OUTPATIENT)
Dept: GASTROENTEROLOGY | Facility: HOSPITAL | Age: 33
DRG: 378 | End: 2023-11-27

## 2023-11-27 ENCOUNTER — APPOINTMENT (OUTPATIENT)
Dept: RADIOLOGY | Facility: HOSPITAL | Age: 33
DRG: 378 | End: 2023-11-27

## 2023-11-27 LAB
ALBUMIN SERPL BCP-MCNC: 4.1 G/DL (ref 3.4–5)
ALP SERPL-CCNC: 47 U/L (ref 33–120)
ALT SERPL W P-5'-P-CCNC: 18 U/L (ref 10–52)
ANION GAP SERPL CALC-SCNC: 12 MMOL/L (ref 10–20)
AST SERPL W P-5'-P-CCNC: 10 U/L (ref 9–39)
BILIRUB SERPL-MCNC: 0.7 MG/DL (ref 0–1.2)
BLOOD EXPIRATION DATE: NORMAL
BLOOD EXPIRATION DATE: NORMAL
BUN SERPL-MCNC: 6 MG/DL (ref 6–23)
CALCIUM SERPL-MCNC: 9.1 MG/DL (ref 8.6–10.3)
CHLORIDE SERPL-SCNC: 106 MMOL/L (ref 98–107)
CO2 SERPL-SCNC: 22 MMOL/L (ref 21–32)
CREAT SERPL-MCNC: 0.88 MG/DL (ref 0.5–1.3)
DISPENSE STATUS: NORMAL
DISPENSE STATUS: NORMAL
ERYTHROCYTE [DISTWIDTH] IN BLOOD BY AUTOMATED COUNT: 25.2 % (ref 11.5–14.5)
GFR SERPL CREATININE-BSD FRML MDRD: >90 ML/MIN/1.73M*2
GLUCOSE SERPL-MCNC: 97 MG/DL (ref 74–99)
HCT VFR BLD AUTO: 27 % (ref 41–52)
HGB BLD-MCNC: 8.3 G/DL (ref 13.5–17.5)
IRON SATN MFR SERPL: 3 % (ref 25–45)
IRON SERPL-MCNC: 14 UG/DL (ref 35–150)
MAGNESIUM SERPL-MCNC: 2.43 MG/DL (ref 1.6–2.4)
MCH RBC QN AUTO: 22.9 PG (ref 26–34)
MCHC RBC AUTO-ENTMCNC: 30.7 G/DL (ref 32–36)
MCV RBC AUTO: 74 FL (ref 80–100)
NRBC BLD-RTO: 1.2 /100 WBCS (ref 0–0)
PATH REVIEW-CBC DIFFERENTIAL: NORMAL
PHOSPHATE SERPL-MCNC: 4 MG/DL (ref 2.5–4.9)
PLATELET # BLD AUTO: 488 X10*3/UL (ref 150–450)
POTASSIUM SERPL-SCNC: 3.3 MMOL/L (ref 3.5–5.3)
PRODUCT BLOOD TYPE: 5100
PRODUCT BLOOD TYPE: 5100
PRODUCT CODE: NORMAL
PRODUCT CODE: NORMAL
PROT SERPL-MCNC: 6.4 G/DL (ref 6.4–8.2)
RBC # BLD AUTO: 3.63 X10*6/UL (ref 4.5–5.9)
SODIUM SERPL-SCNC: 137 MMOL/L (ref 136–145)
TIBC SERPL-MCNC: 435 UG/DL (ref 240–445)
UIBC SERPL-MCNC: 421 UG/DL (ref 110–370)
UNIT ABO: NORMAL
UNIT ABO: NORMAL
UNIT NUMBER: NORMAL
UNIT NUMBER: NORMAL
UNIT RH: NORMAL
UNIT RH: NORMAL
UNIT VOLUME: 350
UNIT VOLUME: 350
VIT B12 SERPL-MCNC: 268 PG/ML (ref 211–911)
WBC # BLD AUTO: 9 X10*3/UL (ref 4.4–11.3)
XM INTEP: NORMAL
XM INTEP: NORMAL

## 2023-11-27 PROCEDURE — 83735 ASSAY OF MAGNESIUM: CPT

## 2023-11-27 PROCEDURE — 83550 IRON BINDING TEST: CPT | Mod: STJLAB | Performed by: INTERNAL MEDICINE

## 2023-11-27 PROCEDURE — 3700000013 HC SEDATION LEVEL 5+ TIME - EACH ADDITIONAL 15 MINUTES: Performed by: INTERNAL MEDICINE

## 2023-11-27 PROCEDURE — 2500000004 HC RX 250 GENERAL PHARMACY W/ HCPCS (ALT 636 FOR OP/ED): Performed by: INTERNAL MEDICINE

## 2023-11-27 PROCEDURE — 86258 DGP ANTIBODY EACH IG CLASS: CPT | Mod: STJLAB | Performed by: NURSE PRACTITIONER

## 2023-11-27 PROCEDURE — 36415 COLL VENOUS BLD VENIPUNCTURE: CPT

## 2023-11-27 PROCEDURE — 36415 COLL VENOUS BLD VENIPUNCTURE: CPT | Performed by: NURSE PRACTITIONER

## 2023-11-27 PROCEDURE — 0DJD8ZZ INSPECTION OF LOWER INTESTINAL TRACT, VIA NATURAL OR ARTIFICIAL OPENING ENDOSCOPIC: ICD-10-PCS | Performed by: INTERNAL MEDICINE

## 2023-11-27 PROCEDURE — 45378 DIAGNOSTIC COLONOSCOPY: CPT

## 2023-11-27 PROCEDURE — 2500000004 HC RX 250 GENERAL PHARMACY W/ HCPCS (ALT 636 FOR OP/ED)

## 2023-11-27 PROCEDURE — 80053 COMPREHEN METABOLIC PANEL: CPT

## 2023-11-27 PROCEDURE — 86364 TISS TRNSGLTMNASE EA IG CLAS: CPT | Mod: STJLAB | Performed by: NURSE PRACTITIONER

## 2023-11-27 PROCEDURE — 3700000012 HC SEDATION LEVEL 5+ TIME - INITIAL 15 MINUTES 5/> YEARS: Performed by: INTERNAL MEDICINE

## 2023-11-27 PROCEDURE — 36415 COLL VENOUS BLD VENIPUNCTURE: CPT | Mod: STJLAB | Performed by: NURSE PRACTITIONER

## 2023-11-27 PROCEDURE — 85027 COMPLETE CBC AUTOMATED: CPT

## 2023-11-27 PROCEDURE — 45378 DIAGNOSTIC COLONOSCOPY: CPT | Performed by: INTERNAL MEDICINE

## 2023-11-27 PROCEDURE — 82607 VITAMIN B-12: CPT | Mod: STJLAB | Performed by: INTERNAL MEDICINE

## 2023-11-27 PROCEDURE — 99152 MOD SED SAME PHYS/QHP 5/>YRS: CPT | Performed by: INTERNAL MEDICINE

## 2023-11-27 PROCEDURE — 71045 X-RAY EXAM CHEST 1 VIEW: CPT | Performed by: RADIOLOGY

## 2023-11-27 PROCEDURE — 2500000004 HC RX 250 GENERAL PHARMACY W/ HCPCS (ALT 636 FOR OP/ED): Performed by: STUDENT IN AN ORGANIZED HEALTH CARE EDUCATION/TRAINING PROGRAM

## 2023-11-27 PROCEDURE — 99233 SBSQ HOSP IP/OBS HIGH 50: CPT | Performed by: STUDENT IN AN ORGANIZED HEALTH CARE EDUCATION/TRAINING PROGRAM

## 2023-11-27 PROCEDURE — 84100 ASSAY OF PHOSPHORUS: CPT | Performed by: NURSE PRACTITIONER

## 2023-11-27 PROCEDURE — 2500000001 HC RX 250 WO HCPCS SELF ADMINISTERED DRUGS (ALT 637 FOR MEDICARE OP): Performed by: NURSE PRACTITIONER

## 2023-11-27 PROCEDURE — 99153 MOD SED SAME PHYS/QHP EA: CPT | Performed by: INTERNAL MEDICINE

## 2023-11-27 PROCEDURE — 82746 ASSAY OF FOLIC ACID SERUM: CPT | Mod: STJLAB | Performed by: INTERNAL MEDICINE

## 2023-11-27 PROCEDURE — 1100000001 HC PRIVATE ROOM DAILY

## 2023-11-27 PROCEDURE — 71045 X-RAY EXAM CHEST 1 VIEW: CPT

## 2023-11-27 PROCEDURE — 99232 SBSQ HOSP IP/OBS MODERATE 35: CPT | Performed by: INTERNAL MEDICINE

## 2023-11-27 PROCEDURE — C9113 INJ PANTOPRAZOLE SODIUM, VIA: HCPCS

## 2023-11-27 RX ORDER — MIDAZOLAM HYDROCHLORIDE 1 MG/ML
INJECTION, SOLUTION INTRAMUSCULAR; INTRAVENOUS AS NEEDED
Status: COMPLETED | OUTPATIENT
Start: 2023-11-27 | End: 2023-11-27

## 2023-11-27 RX ORDER — PANTOPRAZOLE SODIUM 40 MG/1
40 TABLET, DELAYED RELEASE ORAL
Status: DISCONTINUED | OUTPATIENT
Start: 2023-11-27 | End: 2023-11-28 | Stop reason: HOSPADM

## 2023-11-27 RX ORDER — FENTANYL CITRATE 50 UG/ML
INJECTION, SOLUTION INTRAMUSCULAR; INTRAVENOUS AS NEEDED
Status: COMPLETED | OUTPATIENT
Start: 2023-11-27 | End: 2023-11-27

## 2023-11-27 RX ORDER — POTASSIUM CHLORIDE 14.9 MG/ML
20 INJECTION INTRAVENOUS
Status: COMPLETED | OUTPATIENT
Start: 2023-11-27 | End: 2023-11-27

## 2023-11-27 RX ORDER — POTASSIUM CHLORIDE 1.5 G/1.58G
40 POWDER, FOR SOLUTION ORAL ONCE
Status: DISCONTINUED | OUTPATIENT
Start: 2023-11-27 | End: 2023-11-27

## 2023-11-27 RX ORDER — SODIUM CHLORIDE, SODIUM LACTATE, POTASSIUM CHLORIDE, CALCIUM CHLORIDE 600; 310; 30; 20 MG/100ML; MG/100ML; MG/100ML; MG/100ML
75 INJECTION, SOLUTION INTRAVENOUS CONTINUOUS
Status: DISCONTINUED | OUTPATIENT
Start: 2023-11-27 | End: 2023-11-27

## 2023-11-27 RX ADMIN — MIDAZOLAM 3 MG: 1 INJECTION INTRAMUSCULAR; INTRAVENOUS at 10:08

## 2023-11-27 RX ADMIN — FENTANYL CITRATE 50 MCG: 50 INJECTION, SOLUTION INTRAMUSCULAR; INTRAVENOUS at 10:08

## 2023-11-27 RX ADMIN — BISACODYL 10 MG: 5 TABLET, COATED ORAL at 00:17

## 2023-11-27 RX ADMIN — PANTOPRAZOLE SODIUM 40 MG: 40 INJECTION, POWDER, FOR SOLUTION INTRAVENOUS at 09:24

## 2023-11-27 RX ADMIN — SODIUM CHLORIDE, POTASSIUM CHLORIDE, SODIUM LACTATE AND CALCIUM CHLORIDE 75 ML/HR: 600; 310; 30; 20 INJECTION, SOLUTION INTRAVENOUS at 10:54

## 2023-11-27 RX ADMIN — POTASSIUM CHLORIDE 20 MEQ: 14.9 INJECTION, SOLUTION INTRAVENOUS at 13:08

## 2023-11-27 RX ADMIN — POTASSIUM CHLORIDE 20 MEQ: 14.9 INJECTION, SOLUTION INTRAVENOUS at 09:24

## 2023-11-27 ASSESSMENT — COGNITIVE AND FUNCTIONAL STATUS - GENERAL
DAILY ACTIVITIY SCORE: 24
MOBILITY SCORE: 24
MOBILITY SCORE: 24
DAILY ACTIVITIY SCORE: 24

## 2023-11-27 ASSESSMENT — PAIN - FUNCTIONAL ASSESSMENT
PAIN_FUNCTIONAL_ASSESSMENT: 0-10

## 2023-11-27 ASSESSMENT — PAIN SCALES - GENERAL
PAINLEVEL_OUTOF10: 0 - NO PAIN

## 2023-11-27 NOTE — PROGRESS NOTES
"Daryl Win is a 33 y.o. male on day 2 of admission presenting with Upper GI bleed.    Subjective      No acute events overnight. HgB uptrended. EGD done yesterday demonstrating hiatal hernia and gastritis. Had BM last night, denies blood in it. Colonoscopy scheduled today. Bowel prepped overnight.    Objective     Physical Exam  Constitutional:       Appearance: Normal appearance.   HENT:      Head: Normocephalic and atraumatic.      Mouth/Throat:      Mouth: Mucous membranes are moist.   Eyes:      Extraocular Movements: Extraocular movements intact.   Cardiovascular:      Rate and Rhythm: Regular rhythm. Tachycardia present.      Heart sounds: Normal heart sounds. No murmur heard.  Pulmonary:      Effort: Pulmonary effort is normal. No respiratory distress.      Breath sounds: Normal breath sounds. No wheezing.   Abdominal:      General: There is no distension.      Palpations: Abdomen is soft.      Tenderness: There is no abdominal tenderness. There is no guarding.   Musculoskeletal:      Right lower leg: No edema.      Left lower leg: No edema.   Skin:     General: Skin is warm and dry.   Neurological:      General: No focal deficit present.      Mental Status: He is alert and oriented to person, place, and time.   Psychiatric:         Mood and Affect: Mood normal.         Behavior: Behavior normal.         Last Recorded Vitals  Blood pressure (!) 135/92, pulse (!) 114, temperature 37.7 °C (99.9 °F), temperature source Temporal, resp. rate (!) 37, height 1.778 m (5' 10\"), weight 90.2 kg (198 lb 13.7 oz), SpO2 94 %.  Intake/Output last 3 Shifts:  I/O last 3 completed shifts:  In: 2795.8 (31 mL/kg) [P.O.:740; Blood:1655.8; IV Piggyback:400]  Out: 4050 (44.9 mL/kg) [Urine:4050 (1.2 mL/kg/hr)]  Weight: 90.2 kg     Relevant Results                             Assessment/Plan   Principal Problem:    Upper GI bleed  Active Problems:    Anemia    Shortness of breath    Neuro:  -Otherwise at neurological " baseline  -CAM ICU    CV:  -Persisently tachycardic overnight, but normotensive  -Goal MAP >65mmHg  -Keep K>4, Mg>2    Pulmonary:  -No acute issues, sating well on RA    GI:  -Continue Protonix 40mg IV BID -> transition to oral  -S/P EGD on 11/26 demonstrating gastritis  -Continue NPO, follow GI recommendations     Renal/:  -Renal function this AM at baseline    Heme:  -HgB uptrending this AM to 8.3  -Microcytic anemia  -Repeat CBC ordered for this afternoon    Endo:  -Goal BG <180    ID:  -No acute issues    Fluids: None  Electrolytes: Replete per protocol  Nutrition: NPO  DVT Prophylaxis: Withholding due to concern for GI bleed, SCDs  GI Prophylaxis: Protonix 40mg IV BID    Dispo: Accepted by Dr Jordan hospitalist [id number] on 11/27/23. Help greatly appreciated.           Domenico Michaels DO

## 2023-11-27 NOTE — PRE-SEDATION DOCUMENTATION
Patient: Daryl Win  MRN: [id number]    Pre-sedation Evaluation:  Sedation necessary for: Immobility and Analgesia  Requesting service: GI    History of Present Illness:   GIAN     History reviewed. No pertinent past medical history.    Principle problems:  Patient Active Problem List    Diagnosis Date Noted    Anemia 11/26/2023    Shortness of breath 11/26/2023    Upper GI bleed 11/25/2023     Allergies:  No Known Allergies  PTA/Current Medications:  No medications prior to admission.     Current Facility-Administered Medications   Medication Dose Route Frequency Provider Last Rate Last Admin    EPINEPHrine (Adrenalin) 1 mg/10mL injection    PRN Dileep Bishop MD   0.2 mg at 11/26/23 0829    furosemide (Lasix) injection 20 mg  20 mg intravenous Once Álvaro Jai, DO        magnesium sulfate IV 2 g  2 g intravenous q6h PRN Álvaro Jai, DO        magnesium sulfate IV 4 g  4 g intravenous q6h PRN Álvaro Jia, DO        pantoprazole (ProtoNix) injection 40 mg  40 mg intravenous BID Álvaro Jai, DO   40 mg at 11/27/23 0924    potassium chloride CR (Klor-Con M20) ER tablet 20 mEq  20 mEq oral q6h PRN Álvaro Jai, DO        Or    potassium chloride (Klor-Con) packet 20 mEq  20 mEq oral q6h PRN Álvaro Jai, DO        potassium chloride CR (Klor-Con M20) ER tablet 40 mEq  40 mEq oral q6h PRN Álvaro Jai, DO        Or    potassium chloride (Klor-Con) packet 40 mEq  40 mEq oral q6h PRN Álvaro Jai, DO        potassium chloride 20 mEq in 100 mL IV premix  20 mEq intravenous q6h PRN Álvaro Jai, DO        potassium chloride 20 mEq in 100 mL IV premix  20 mEq intravenous q2h Domenico Michaels, DO 50 mL/hr at 11/27/23 0924 20 mEq at 11/27/23 0924    potassium chloride 40 mEq in dextrose 5 % in water (D5W) 250 mL IV  40 mEq intravenous q6h PRN Álvaro Jai, DO         Past Surgical History:   has no past surgical history on file.    Recent sedation/surgery (24 hours) No    Review of Systems:  Please check all that apply: No  significant medical history        NPO guidelines met: Yes    Physical Exam    Airway  Mallampati: II     Cardiovascular   Rhythm: regular  Rate: normal     Dental    Pulmonary   Breath sounds clear to auscultation         Plan    ASA 3     Moderate

## 2023-11-27 NOTE — PROGRESS NOTES
Spiritual Care Visit    Clinical Encounter Type  Visited With: Patient  Routine Visit: Introduction  Continue Visiting: No                                            Taxonomy  Intended Effects: Preserve dignity and respect, Promote sense of peace, Convey a calming presence  Methods: Offer support  Interventions: Share words of hope and inspiration    Patient shared he has no chi affiliation. Patient said his family comes from [facility].  Patient said his mom and sister and brother visit him.   was a supportive presence.

## 2023-11-27 NOTE — CARE PLAN
The patient's goals for the shift include complete bowel prep for colonoscopy      The clinical goals for the shift include Obtain normal hemoglobin level    Over the shift, the patient did not make progress toward the following goals. Barriers to progression include none. Recommendations to address these barriers include none.

## 2023-11-27 NOTE — PROGRESS NOTES
11/27/23 1156   Discharge Planning   Living Arrangements Parent   Support Systems Parent   Type of Residence Private residence   Home or Post Acute Services None   Patient expects to be discharged to: Home   Does the patient need discharge transport arranged? No     Met with patient at bedside. Admitted for GI bleed and anemia. Pt lives with mother and was independent PTA with no HHC or DME. Able to obtain medications. Pt plans to return home with no new discharge needs. Family will provide transport home .

## 2023-11-27 NOTE — CARE PLAN
Patient underwent colonoscopy with Dr. Garcia today with normal findings. Source of severe anemia possibly secondary to chronic loss secondary to Bulmaro lesions noted on EGD yesterday.     Plan:  - continue supportive care  - ok for diet as tolerated  - continue to trend hgb daily unless pt becomes symptomatic or decompensates  - transfuse to maintain hgb >7  - continue to monitor for overt signs of bleeding  - avoid NSAIDS   - can continue PPI po BID at discharge  - check celiac panel  - repeat colonoscopy in 10 years   - pt will follow up in the outpatient GI office in the next 2 weeks with Dr. Bishop.   - please feel free to call us with any questions or concerns    Plan has been discussed with Dr. Garcia. GI will sign off.    TUYET Patricio/CNP

## 2023-11-27 NOTE — PROGRESS NOTES
Daryl Win is a 33 y.o. male on day 2 of admission presenting with Upper GI bleed.      Subjective   Well.  No longer dyspneic with exertion.  He is on room air and does not feel short of breath or having any chest pain.  Denies any bloody stool or melanotic bowel movement.  Talked about being downgraded to hospitalist service    Objective     Last Recorded Vitals  BP 91/57   Pulse 107   Temp 36.6 °C (97.9 °F)   Resp 20   Wt 85.1 kg (187 lb 9.8 oz)   SpO2 98%   Intake/Output last 3 Shifts:    Intake/Output Summary (Last 24 hours) at 11/27/2023 1548  Last data filed at 11/27/2023 1034  Gross per 24 hour   Intake 740 ml   Output 450 ml   Net 290 ml       Admission Weight  Weight: 81.6 kg (180 lb) (11/25/23 1744)    Daily Weight  11/27/23 : 85.1 kg (187 lb 9.8 oz)      Physical Exam:  General: Not in acute distress, alert  HEENT: PERRLA, head intact and normocephalic  Neck: Normal to inspection  Lungs: Clear to auscultation, work of breathing within normal limit  Cardiac: Regular rate and rhythm  Abdomen: Soft nontender, positive bowel sounds  : Exam deferred  Skin: Intact  Hematology: No petechia or excessive ecchymosis  Musculoskeletal: Without significant trauma  Neurological: Alert awake oriented, no focal deficit, cranial nerves grossly intact  Psych: No suicidal ideation or homicidal ideation    Relevant Results  Scheduled medications  pantoprazole, 40 mg, oral, BID AC      Continuous medications     PRN medications     Results for orders placed or performed during the hospital encounter of 11/25/23 (from the past 24 hour(s))   Hemoglobin and Hematocrit   Result Value Ref Range    Hemoglobin 7.6 (L) 13.5 - 17.5 g/dL    Hematocrit 26.2 (L) 41.0 - 52.0 %   CBC   Result Value Ref Range    WBC 9.0 4.4 - 11.3 x10*3/uL    nRBC 1.2 (H) 0.0 - 0.0 /100 WBCs    RBC 3.63 (L) 4.50 - 5.90 x10*6/uL    Hemoglobin 8.3 (L) 13.5 - 17.5 g/dL    Hematocrit 27.0 (L) 41.0 - 52.0 %    MCV 74 (L) 80 - 100 fL    MCH 22.9 (L)  26.0 - 34.0 pg    MCHC 30.7 (L) 32.0 - 36.0 g/dL    RDW 25.2 (H) 11.5 - 14.5 %    Platelets 488 (H) 150 - 450 x10*3/uL   Comprehensive metabolic panel   Result Value Ref Range    Glucose 97 74 - 99 mg/dL    Sodium 137 136 - 145 mmol/L    Potassium 3.3 (L) 3.5 - 5.3 mmol/L    Chloride 106 98 - 107 mmol/L    Bicarbonate 22 21 - 32 mmol/L    Anion Gap 12 10 - 20 mmol/L    Urea Nitrogen 6 6 - 23 mg/dL    Creatinine 0.88 0.50 - 1.30 mg/dL    eGFR >90 >60 mL/min/1.73m*2    Calcium 9.1 8.6 - 10.3 mg/dL    Albumin 4.1 3.4 - 5.0 g/dL    Alkaline Phosphatase 47 33 - 120 U/L    Total Protein 6.4 6.4 - 8.2 g/dL    AST 10 9 - 39 U/L    Bilirubin, Total 0.7 0.0 - 1.2 mg/dL    ALT 18 10 - 52 U/L   Magnesium   Result Value Ref Range    Magnesium 2.43 (H) 1.60 - 2.40 mg/dL   Phosphorus   Result Value Ref Range    Phosphorus 4.0 2.5 - 4.9 mg/dL        Colonoscopy Diagnostic    Result Date: 11/27/2023  Table formatting from the original result was not included. Impression The entire colon appeared normal. Findings The entire colon appeared normal.; Recommendation  Repeat screening colonoscopy in 10 years  Follow up in the GI office with Dr. Bishop in 2-3 weeks. Can be started on a regular diet. Blood work celiac screen.  Indication Acute blood loss anemia Post Procedure Diagnosis Normal Colonoscopy Staff Staff Role No Staff Documented Medications fentaNYL PF (Sublimaze) injection 50 mcg midazolam (Versed) injection 3 mg (Totals for administrations occurring from 1004 to 1026 on 11/27/23) Preprocedure A history and physical has been performed, and patient medication allergies have been reviewed. The patient's tolerance of previous anesthesia has been reviewed. The risks and benefits of the procedure and the sedation options and risks were discussed with the patient. All questions were answered and informed consent obtained. Details of the Procedure The patient underwent moderate sedation, which was administered by the procedural nurse.  The patient's blood pressure, heart rate, level of consciousness, respirations, oxygen, ECG and ETCO2 were monitored throughout the procedure. A digital rectal exam was performed. The scope was introduced through the anus and advanced to the cecum. Retroflexion was performed in the rectum. The quality of bowel preparation was evaluated using the [place] Bowel Preparation Scale with scores of: right colon = 3, transverse colon = 3, left colon = 3. The total BBPS score was 9. Bowel prep was adequate. The patient experienced no blood loss. The procedure was not difficult. The patient tolerated the procedure well. There were no apparent adverse events. Events Procedure Events Event Event Time ENDO SCOPE IN TIME 11/27/2023 10:11 AM ENDO CECUM REACHED 11/27/2023 10:15 AM ENDO SCOPE OUT TIME 11/27/2023 10:24 AM Specimens No specimens collected Procedure Location [facility] Intensive [facility] [address] [facility] [id number] Referring Provider No referring provider defined for this encounter. Procedure Provider No name on file     XR chest 1 view    Result Date: 11/27/2023  Interpreted By:  Tyler Parker, STUDY: XR CHEST 1 VIEW;  11/27/2023 8:48 am   INDICATION: Signs/Symptoms:Hypoxic.   COMPARISON: Chest radiograph dated 11/26/2023.   ACCESSION NUMBER(S): [id number]   ORDERING CLINICIAN: GEORGE HERNANDEZ   FINDINGS: AP radiograph of the chest was provided.   DEVICES: None.   CARDIOMEDIASTINAL SILHOUETTE: Cardiomediastinal silhouette is stable in size and configuration with suspected hiatal hernia.   LUNGS: Mild bibasilar parenchymal airspace opacity. Questionable trace blunting of the right costophrenic sulcus. Remainder of the lungs appear well aerated. No pneumothorax. No pleural effusion.   ABDOMEN: No remarkable upper abdominal findings.   BONES: No acute osseous changes.       New small right pleural effusion with mild bibasilar parenchymal opacity that may reflect  atelectasis, pneumonia, or pulmonary edema.   MACRO: None   Signed by: Tyler Parker 11/27/2023 8:55 AM Dictation workstation:   [id number]    EGD    Result Date: 11/26/2023  Table formatting from the original result was not included. Impression The duodenal bulb and 2nd part of the duodenum appeared normal. 10 cm type I hiatal hernia with Bulmaro lesions present Findings The duodenal bulb and 2nd part of the duodenum appeared normal. There were significant Bulmaro lesions in the proximal stomach without bleeding.  The stomach was otherwise normal. 10 cm sliding hiatal hernia (type I hiatal hernia) with Bulmaro lesions present, confirmed by retroflexion Possible Samantha-Rich tear at the GEJ on withdrawal.  There was no bleeding on insertion.  Injected with 1.5 ml of Epi with control of bleeding. Recommendation  Follow up with PCP      Return to [facility]      He will need a colonoscopy for his profound anemia Indication Acute blood loss anemia, Upper GI bleed Post Procedure Diagnosis Large hiatal hernia with Bulmaro lesions Staff Staff Role No Staff Documented Medications See Anesthesia Record. Preprocedure A history and physical has been performed, and patient medication allergies have been reviewed. The patient's tolerance of previous anesthesia has been reviewed. The risks and benefits of the procedure and the sedation options and risks were discussed with the patient. All questions were answered and informed consent obtained. Details of the Procedure The patient underwent monitored anesthesia care, which was administered by an anesthesia professional. The patient's blood pressure, level of consciousness, oxygen, respirations, heart rate, ECG and ETCO2 were monitored throughout the procedure. The scope was introduced through the mouth and advanced to the second part of the duodenum. Retroflexion was performed in the cardia. The patient's estimated blood loss was minimal (<5 mL). The procedure was  not difficult. The patient tolerated the procedure well. There were no apparent adverse events. Events Procedure Events Event Event Time ENDO SCOPE IN TIME 11/26/2023  8:19 AM ENDO SCOPE OUT TIME 11/26/2023  8:28 AM Specimens No specimens collected Procedure Location [facility] Intensive Care [address] [place] [address] Referring Provider No referring provider defined for this encounter. Procedure Provider No name on file     XR chest 1 view    Result Date: 11/26/2023  Interpreted By:  Anton Honeycutt, STUDY: XR CHEST 1 VIEW;  11/26/2023 5:56 am   INDICATION: Signs/Symptoms:hypoxia.   COMPARISON: None.   ACCESSION NUMBER(S): [id number]   ORDERING CLINICIAN: MAAME CHANDLER   FINDINGS: AP portable view of the chest is obtained.  Limited exam due to portable nature. Magnified cardiac silhouette. No infiltrates, effusions or pneumothorax. Retrocardiac hiatal hernia.       1.  No evidence of acute cardiopulmonary process. Likely a hiatal hernia.       MACRO: None   Signed by: Anton Honeycutt 11/26/2023 7:20 AM Dictation workstation:   [id number]          Assessment/Plan   Daryl Win is a 33 y.o. male on day 2 of admission presenting with Upper GI bleed.  Principal Problem:    Upper GI bleed  Suspect combination of acute on chronic blood loss anemia from Bulmaro lesion that were noted on EGD  S/p EGD and treatment  Okay for diet  Hemoglobin is greater than 7  Avoid NSAIDs  GI recommended checking celiac panel  We will do anemia work-up with iron studies, B12 and folate level as well  Discharged on PPI twice a day  Follow-up with Dr. Bishop in 2 weeks as outpatient    Tachycardia  Persistent yet improved  We will monitor patient on telemetry  TSH on admission within normal limit  Could be related to anemia and compensation mechanism  We will check echocardiogram for patient due to persistent tachycardia  Electrolytes optimization  We will stop IV fluids as patient is  eating and drinking      Anemia  Management as above  Repeat CBC tomorrow morning      Shortness of breath  Has improved significantly  Secondary to symptomatic anemia    Discharge planning      Plan discussed with patient at bedside in ICU    Moderate level of MDM based on above issue and discussing plan    This note is created using voice recognition software. All efforts are made to minimize errors, if there are errors there due to transcription.    Ben Jordan  Hospitalist

## 2023-11-28 ENCOUNTER — APPOINTMENT (OUTPATIENT)
Dept: CARDIOLOGY | Facility: HOSPITAL | Age: 33
DRG: 378 | End: 2023-11-28

## 2023-11-28 VITALS
WEIGHT: 187.39 LBS | TEMPERATURE: 97.9 F | BODY MASS INDEX: 26.83 KG/M2 | HEART RATE: 106 BPM | RESPIRATION RATE: 18 BRPM | HEIGHT: 70 IN | SYSTOLIC BLOOD PRESSURE: 113 MMHG | DIASTOLIC BLOOD PRESSURE: 72 MMHG | OXYGEN SATURATION: 95 %

## 2023-11-28 PROBLEM — R06.02 SHORTNESS OF BREATH: Status: RESOLVED | Noted: 2023-11-26 | Resolved: 2023-11-28

## 2023-11-28 LAB
ALBUMIN SERPL BCP-MCNC: 3.6 G/DL (ref 3.4–5)
ALP SERPL-CCNC: 37 U/L (ref 33–120)
ALT SERPL W P-5'-P-CCNC: 13 U/L (ref 10–52)
ANION GAP SERPL CALC-SCNC: 13 MMOL/L (ref 10–20)
AORTIC VALVE PEAK VELOCITY: 1.19
AST SERPL W P-5'-P-CCNC: 11 U/L (ref 9–39)
AV PEAK GRADIENT: 5.7
AVA (PEAK VEL): 2.82
BASOPHILS # BLD AUTO: 0.08 X10*3/UL (ref 0–0.1)
BASOPHILS NFR BLD AUTO: 1.2 %
BILIRUB SERPL-MCNC: 0.5 MG/DL (ref 0–1.2)
BUN SERPL-MCNC: 11 MG/DL (ref 6–23)
CALCIUM SERPL-MCNC: 8.7 MG/DL (ref 8.6–10.3)
CHLORIDE SERPL-SCNC: 107 MMOL/L (ref 98–107)
CO2 SERPL-SCNC: 21 MMOL/L (ref 21–32)
CREAT SERPL-MCNC: 0.91 MG/DL (ref 0.5–1.3)
EJECTION FRACTION APICAL 4 CHAMBER: 63.6
EJECTION FRACTION: 60
EOSINOPHIL # BLD AUTO: 0.23 X10*3/UL (ref 0–0.7)
EOSINOPHIL NFR BLD AUTO: 3.5 %
ERYTHROCYTE [DISTWIDTH] IN BLOOD BY AUTOMATED COUNT: 26.2 % (ref 11.5–14.5)
FOLATE SERPL-MCNC: 10.8 NG/ML
GFR SERPL CREATININE-BSD FRML MDRD: >90 ML/MIN/1.73M*2
GIANT PLATELETS BLD QL SMEAR: NORMAL
GLIADIN PEPTIDE IGA SER IA-ACNC: 1.6 U/ML
GLIADIN PEPTIDE IGG SER IA-ACNC: <1 U/ML
GLUCOSE SERPL-MCNC: 100 MG/DL (ref 74–99)
HCT VFR BLD AUTO: 26.1 % (ref 41–52)
HGB BLD-MCNC: 7.9 G/DL (ref 13.5–17.5)
IMM GRANULOCYTES # BLD AUTO: 0.05 X10*3/UL (ref 0–0.7)
IMM GRANULOCYTES NFR BLD AUTO: 0.8 % (ref 0–0.9)
LEFT VENTRICLE INTERNAL DIMENSION DIASTOLE: 5.1 (ref 3.5–6)
LEFT VENTRICULAR OUTFLOW TRACT DIAMETER: 2.1
LYMPHOCYTES # BLD AUTO: 1.27 X10*3/UL (ref 1.2–4.8)
LYMPHOCYTES NFR BLD AUTO: 19.1 %
MAGNESIUM SERPL-MCNC: 2.33 MG/DL (ref 1.6–2.4)
MCH RBC QN AUTO: 22.7 PG (ref 26–34)
MCHC RBC AUTO-ENTMCNC: 30.3 G/DL (ref 32–36)
MCV RBC AUTO: 75 FL (ref 80–100)
MITRAL VALVE E/A RATIO: 1.11
MITRAL VALVE E/E' RATIO: 9.93
MONOCYTES # BLD AUTO: 0.54 X10*3/UL (ref 0.1–1)
MONOCYTES NFR BLD AUTO: 8.1 %
NEUTROPHILS # BLD AUTO: 4.47 X10*3/UL (ref 1.2–7.7)
NEUTROPHILS NFR BLD AUTO: 67.3 %
NRBC BLD-RTO: 0.5 /100 WBCS (ref 0–0)
OVALOCYTES BLD QL SMEAR: NORMAL
PHOSPHATE SERPL-MCNC: 5.1 MG/DL (ref 2.5–4.9)
PLATELET # BLD AUTO: 425 X10*3/UL (ref 150–450)
POLYCHROMASIA BLD QL SMEAR: NORMAL
POTASSIUM SERPL-SCNC: 3.7 MMOL/L (ref 3.5–5.3)
PROT SERPL-MCNC: 5.8 G/DL (ref 6.4–8.2)
RBC # BLD AUTO: 3.48 X10*6/UL (ref 4.5–5.9)
RBC MORPH BLD: NORMAL
RIGHT VENTRICLE FREE WALL PEAK S': 14
RIGHT VENTRICLE PEAK SYSTOLIC PRESSURE: 16.7
SCHISTOCYTES BLD QL SMEAR: NORMAL
SODIUM SERPL-SCNC: 137 MMOL/L (ref 136–145)
TARGETS BLD QL SMEAR: NORMAL
TRICUSPID ANNULAR PLANE SYSTOLIC EXCURSION: 2.1
TTG IGA SER IA-ACNC: <1 U/ML
TTG IGG SER IA-ACNC: <1 U/ML
WBC # BLD AUTO: 6.6 X10*3/UL (ref 4.4–11.3)

## 2023-11-28 PROCEDURE — 2500000004 HC RX 250 GENERAL PHARMACY W/ HCPCS (ALT 636 FOR OP/ED): Performed by: INTERNAL MEDICINE

## 2023-11-28 PROCEDURE — 36415 COLL VENOUS BLD VENIPUNCTURE: CPT | Performed by: INTERNAL MEDICINE

## 2023-11-28 PROCEDURE — 2500000001 HC RX 250 WO HCPCS SELF ADMINISTERED DRUGS (ALT 637 FOR MEDICARE OP): Performed by: INTERNAL MEDICINE

## 2023-11-28 PROCEDURE — 80053 COMPREHEN METABOLIC PANEL: CPT

## 2023-11-28 PROCEDURE — 83735 ASSAY OF MAGNESIUM: CPT

## 2023-11-28 PROCEDURE — 2500000004 HC RX 250 GENERAL PHARMACY W/ HCPCS (ALT 636 FOR OP/ED): Performed by: STUDENT IN AN ORGANIZED HEALTH CARE EDUCATION/TRAINING PROGRAM

## 2023-11-28 PROCEDURE — 99239 HOSP IP/OBS DSCHRG MGMT >30: CPT | Performed by: INTERNAL MEDICINE

## 2023-11-28 PROCEDURE — 84100 ASSAY OF PHOSPHORUS: CPT | Performed by: NURSE PRACTITIONER

## 2023-11-28 PROCEDURE — 93306 TTE W/DOPPLER COMPLETE: CPT

## 2023-11-28 PROCEDURE — 85025 COMPLETE CBC W/AUTO DIFF WBC: CPT | Performed by: INTERNAL MEDICINE

## 2023-11-28 PROCEDURE — 36415 COLL VENOUS BLD VENIPUNCTURE: CPT

## 2023-11-28 PROCEDURE — 96372 THER/PROPH/DIAG INJ SC/IM: CPT | Performed by: INTERNAL MEDICINE

## 2023-11-28 RX ORDER — TALC
3 POWDER (GRAM) TOPICAL NIGHTLY PRN
Status: DISCONTINUED | OUTPATIENT
Start: 2023-11-28 | End: 2023-11-28 | Stop reason: HOSPADM

## 2023-11-28 RX ORDER — LANOLIN ALCOHOL/MO/W.PET/CERES
1000 CREAM (GRAM) TOPICAL DAILY
Qty: 30 TABLET | Refills: 0 | Status: SHIPPED | OUTPATIENT
Start: 2023-11-28 | End: 2023-12-28

## 2023-11-28 RX ORDER — POTASSIUM CHLORIDE 20 MEQ/1
20 TABLET, EXTENDED RELEASE ORAL ONCE
Status: DISCONTINUED | OUTPATIENT
Start: 2023-11-28 | End: 2023-11-28

## 2023-11-28 RX ORDER — PANTOPRAZOLE SODIUM 40 MG/1
40 TABLET, DELAYED RELEASE ORAL
Qty: 60 TABLET | Refills: 0 | Status: SHIPPED | OUTPATIENT
Start: 2023-11-28 | End: 2023-12-28

## 2023-11-28 RX ORDER — CYANOCOBALAMIN 1000 UG/ML
1000 INJECTION, SOLUTION INTRAMUSCULAR; SUBCUTANEOUS ONCE
Status: COMPLETED | OUTPATIENT
Start: 2023-11-28 | End: 2023-11-28

## 2023-11-28 RX ORDER — POTASSIUM CHLORIDE 20 MEQ/1
40 TABLET, EXTENDED RELEASE ORAL ONCE
Status: COMPLETED | OUTPATIENT
Start: 2023-11-28 | End: 2023-11-28

## 2023-11-28 RX ORDER — FERROUS SULFATE 325(65) MG
1 TABLET, DELAYED RELEASE (ENTERIC COATED) ORAL
Qty: 30 TABLET | Refills: 0 | Status: SHIPPED | OUTPATIENT
Start: 2023-11-28 | End: 2023-12-28

## 2023-11-28 RX ADMIN — Medication 3 MG: at 00:52

## 2023-11-28 RX ADMIN — IRON SUCROSE 300 MG: 20 INJECTION, SOLUTION INTRAVENOUS at 10:24

## 2023-11-28 RX ADMIN — PANTOPRAZOLE SODIUM 40 MG: 40 TABLET, DELAYED RELEASE ORAL at 09:00

## 2023-11-28 RX ADMIN — POTASSIUM CHLORIDE 40 MEQ: 1500 TABLET, EXTENDED RELEASE ORAL at 06:10

## 2023-11-28 RX ADMIN — CYANOCOBALAMIN 1000 MCG: 1000 INJECTION, SOLUTION INTRAMUSCULAR; SUBCUTANEOUS at 10:24

## 2023-11-28 ASSESSMENT — COGNITIVE AND FUNCTIONAL STATUS - GENERAL
DAILY ACTIVITIY SCORE: 24
MOBILITY SCORE: 24

## 2023-11-28 ASSESSMENT — PAIN SCALES - GENERAL
PAINLEVEL_OUTOF10: 0 - NO PAIN

## 2023-11-28 ASSESSMENT — PAIN - FUNCTIONAL ASSESSMENT
PAIN_FUNCTIONAL_ASSESSMENT: 0-10

## 2023-11-28 NOTE — PROGRESS NOTES
Lives with his mother. Does not have insurance due to the expense. Financial forms faxed to him by . Mother to pick him up. No home going needs

## 2023-11-28 NOTE — DISCHARGE SUMMARY
Discharge Diagnosis  Upper GI bleed    Issues Requiring Follow-Up  Anemia  Biopsy results  GI follow-up after discharge  PCP follow-up    Discharge Meds     Your medication list        START taking these medications        Instructions Last Dose Given Next Dose Due   cyanocobalamin 1,000 mcg tablet  Commonly known as: Vitamin B-12      Take 1 tablet (1,000 mcg) by mouth once daily.       ferrous sulfate 325 (65 Fe) MG EC tablet      Take 1 tablet by mouth once daily with a meal. Do not crush, chew, or split.  Take with cup of orange juice       pantoprazole 40 mg EC tablet  Commonly known as: ProtoNix      Take 1 tablet (40 mg) by mouth 2 times a day before meals. Do not crush, chew, or split.                 Where to Get Your Medications        These medications were sent to [facility]/pharmacy #[id number] [place] [id number] [place] [address] [address], [place] [id number]      Phone: [phone]   cyanocobalamin 1,000 mcg tablet  pantoprazole 40 mg EC tablet       You can get these medications from any pharmacy    Bring a paper prescription for each of these medications  ferrous sulfate 325 (65 Fe) MG EC tablet         Test Results Pending At Discharge  Pending Labs       Order Current Status    Celiac Panel In process            Hospital Course   33-year-old male without any significant past medical history presented to emergency department for tachycardia along with dyspnea on exertion and chest tightness.  When patient arrived to ER was found to have hemoglobin of 2.3 and this was likely secondary to iron deficiency anemia secondary to blood loss.  Patient was given 40 of IV Protonix and GI was consulted.  GI took patient for EGD and Bulmaro lesions were noted on EGD which were likely the source of bleeding.  Patient received multiple units of blood and his tachycardia along with dyspnea on exertion has improved.  Patient had iron studies done here which does show significant iron deficiency anemia  with low B12 level.  Patient was given IV Venofer and given B12 IM injection.  Patient is transition to oral iron supplement and B12 supplement as well.  He needs to follow-up with GI as outpatient regarding celiac panel and further work-up as outpatient through GI.  Currently he is in stable condition for discharge and we are asking him to do a repeat CBC as outpatient in 1 week to monitor his hemoglobin.  We are going to put him on PPI twice a day and asking him to avoid NSAIDs altogether.    39 minutes spent in discharge timing    Pertinent Physical Exam At Time of Discharge  Exam general: Not in acute distress, alert  HEENT: PERRLA, head intact and normocephalic  Neck: Normal to inspection  Lungs: Clear to auscultation, work of breathing within normal limit  Cardiac: Regular rate and rhythm  Abdomen: Soft nontender, positive bowel sounds  : Exam deferred  Skin: Intact  Hematology: No petechia or excessive ecchymosis  Musculoskeletal: Without significant trauma  Neurological: Alert awake oriented, no focal deficit, cranial nerves grossly intact  Psych: No suicidal ideation or homicidal ideation    Outpatient Follow-Up  Future Appointments   Date Time Provider Department Center   12/4/2023  2:30 PM Haley Khan DO [id number] [facility] Jordan MD

## 2023-11-28 NOTE — CARE PLAN
Problem: Discharge Planning  Goal: Discharge to home or other facility with appropriate resources  Outcome: Progressing   The patient's goals for the shift include      The clinical goals for the shift include Obtain normal hemoglobin level

## 2023-11-29 ENCOUNTER — PATIENT OUTREACH (OUTPATIENT)
Dept: PRIMARY CARE | Facility: CLINIC | Age: 33
End: 2023-11-29

## 2023-11-29 NOTE — PROGRESS NOTES
Discharge Facility: [facility]  Discharge Diagnosis: GI bleed  Admission Date: 11/25/2023  Discharge Date: 11/28/2023    PCP Appointment Date: 12/4/2023  Specialist Appointment Date: Follow up with Dileep Bishop MD in 2 week(s)  Hospital Encounter and Summary: Linked     Unsuccessful attempts x 2 to reach patient for PCP follow up.  Patient has a follow up scheduled with PCP.        New meds:  cyanocobalamin   ferrous sulfate   pantoprazole

## 2023-12-01 ENCOUNTER — HOSPITAL ENCOUNTER (OUTPATIENT)
Dept: CARDIOLOGY | Facility: HOSPITAL | Age: 33
Discharge: HOME | End: 2023-12-01

## 2023-12-01 LAB
ATRIAL RATE: 139 BPM
P AXIS: -3 DEGREES
P OFFSET: 204 MS
P ONSET: 154 MS
PR INTERVAL: 136 MS
Q ONSET: 222 MS
QRS COUNT: 23 BEATS
QRS DURATION: 76 MS
QT INTERVAL: 288 MS
QTC CALCULATION(BAZETT): 438 MS
QTC FREDERICIA: 381 MS
R AXIS: 2 DEGREES
T AXIS: -20 DEGREES
T OFFSET: 366 MS
VENTRICULAR RATE: 139 BPM

## 2023-12-01 PROCEDURE — 93005 ELECTROCARDIOGRAM TRACING: CPT

## 2023-12-04 ENCOUNTER — OFFICE VISIT (OUTPATIENT)
Dept: PRIMARY CARE | Facility: CLINIC | Age: 33
End: 2023-12-04

## 2023-12-04 VITALS
HEIGHT: 70 IN | HEART RATE: 113 BPM | TEMPERATURE: 98.9 F | BODY MASS INDEX: 26.2 KG/M2 | DIASTOLIC BLOOD PRESSURE: 88 MMHG | OXYGEN SATURATION: 97 % | RESPIRATION RATE: 18 BRPM | WEIGHT: 183 LBS | SYSTOLIC BLOOD PRESSURE: 136 MMHG

## 2023-12-04 DIAGNOSIS — K92.2 UPPER GI BLEED: Primary | ICD-10-CM

## 2023-12-04 PROCEDURE — 99213 OFFICE O/P EST LOW 20 MIN: CPT | Performed by: FAMILY MEDICINE

## 2023-12-04 PROCEDURE — 1036F TOBACCO NON-USER: CPT | Performed by: FAMILY MEDICINE

## 2023-12-04 NOTE — PROGRESS NOTES
"Subjective     Patient ID: Daryl Win is a 33 y.o. male who presents for Hospital Follow-up (Per patient he was in the ICU for 4 days. Per patient he has shortness of breath, elevated heart rate, no iron, and low hemoglobin. Patient was dx with severe anemia and sonia lesions. ).      Objective     Vitals:    12/04/23 1439   BP: 136/88   BP Location: Right arm   Patient Position: Sitting   Pulse: (!) 113   Resp: 18   Temp: 37.2 °C (98.9 °F)   TempSrc: Temporal   SpO2: 97%   Weight: 83 kg (183 lb)   Height: 1.778 m (5' 10\")        Current Outpatient Medications   Medication Instructions    cyanocobalamin (VITAMIN B-12) 1,000 mcg, oral, Daily    ferrous sulfate 325 (65 Fe) MG EC tablet 1 tablet, oral, Daily with breakfast, Do not crush, chew, or split.<BR>Take with cup of orange juice    pantoprazole (PROTONIX) 40 mg, oral, 2 times daily before meals, Do not crush, chew, or split.        Physical Exam  Constitutional:       Appearance: Normal appearance.   HENT:      Head: Normocephalic and atraumatic.      Right Ear: Tympanic membrane normal.      Left Ear: Tympanic membrane normal.      Nose: Nose normal.      Mouth/Throat:      Mouth: Mucous membranes are moist.   Cardiovascular:      Rate and Rhythm: Normal rate and regular rhythm.   Pulmonary:      Effort: Pulmonary effort is normal.      Breath sounds: Normal breath sounds.   Neurological:      Mental Status: He is alert.       Assessment/Plan   Problem List Items Addressed This Visit             ICD-10-CM    Upper GI bleed - Primary K92.2   Foolw-up with GI as scheduled                       "

## 2023-12-07 ENCOUNTER — PATIENT OUTREACH (OUTPATIENT)
Dept: PRIMARY CARE | Facility: CLINIC | Age: 33
End: 2023-12-07

## 2023-12-07 NOTE — PROGRESS NOTES
Unable to reach patient for call back after patient's follow up appointment with PCP.   LVM with call back number for patient to call if needed

## 2024-01-08 ENCOUNTER — PATIENT OUTREACH (OUTPATIENT)
Dept: PRIMARY CARE | Facility: CLINIC | Age: 34
End: 2024-01-08

## 2024-01-10 ENCOUNTER — OFFICE VISIT (OUTPATIENT)
Dept: GASTROENTEROLOGY | Facility: CLINIC | Age: 34
End: 2024-01-10

## 2024-01-10 VITALS
SYSTOLIC BLOOD PRESSURE: 147 MMHG | DIASTOLIC BLOOD PRESSURE: 87 MMHG | HEIGHT: 70 IN | BODY MASS INDEX: 26.77 KG/M2 | HEART RATE: 82 BPM | WEIGHT: 187 LBS

## 2024-01-10 DIAGNOSIS — D50.0 IRON DEFICIENCY ANEMIA DUE TO CHRONIC BLOOD LOSS: ICD-10-CM

## 2024-01-10 DIAGNOSIS — K27.4 PEPTIC ULCER DISEASE WITH HEMORRHAGE: ICD-10-CM

## 2024-01-10 DIAGNOSIS — K92.2 UPPER GI BLEED: Primary | ICD-10-CM

## 2024-01-10 DIAGNOSIS — K44.9 HIATAL HERNIA: ICD-10-CM

## 2024-01-10 PROCEDURE — 99214 OFFICE O/P EST MOD 30 MIN: CPT | Performed by: REGISTERED NURSE

## 2024-01-10 PROCEDURE — 1036F TOBACCO NON-USER: CPT | Performed by: REGISTERED NURSE

## 2024-01-10 RX ORDER — PANTOPRAZOLE SODIUM 40 MG/1
40 TABLET, DELAYED RELEASE ORAL
Qty: 60 TABLET | Refills: 0 | Status: ON HOLD | OUTPATIENT
Start: 2024-01-10 | End: 2024-06-11

## 2024-01-10 ASSESSMENT — ENCOUNTER SYMPTOMS
SHORTNESS OF BREATH: 0
COUGH: 0
NERVOUS/ANXIOUS: 0
ANAL BLEEDING: 0
UNEXPECTED WEIGHT CHANGE: 0
APPETITE CHANGE: 0
ABDOMINAL PAIN: 0
EYES NEGATIVE: 1
DIFFICULTY URINATING: 0
MYALGIAS: 0
RECTAL PAIN: 0
VOMITING: 0
ENDOCRINE NEGATIVE: 1
JOINT SWELLING: 0
ABDOMINAL DISTENTION: 0
DIARRHEA: 0
BLOOD IN STOOL: 0
NAUSEA: 0
EYE PAIN: 0
CONSTIPATION: 0
ARTHRALGIAS: 0

## 2024-01-10 NOTE — PROGRESS NOTES
REASON FOR VISIT:  NPV stomach ulcers and anemia. Admitted to [facility] on 23 for SOB and fatigue. EGD 23 with Dr. Bishop and a colonoscopy 23 with Dr. Garcia. Diagnosed with a bleeding ulcer. Overall feeling well. Moves bowels 1-2 times daily; denies blood in stool. Denies NVD.     HPI:  Drayl Win is a 33 y.o. male with no significant medical history here for follow-up after recent hospitalization for upper GI bleed.  Patient had an EGD and colonoscopy while hospitalized.  The EGD showed Bulmaro lesions which were the likely source of bleeding and a large hiatal hernia.  He received 5 units of blood.  He also received IV Venofer and given B12 injection.  He transitioned to an oral iron supplement and was discharged.  He was discharged on Protonix 40 mg twice a day and advised to follow-up in the GI clinic.  He is currently taking ferrous sulfate once daily and vitamin B12 1000 mcg daily.    Patient noticed that he was becoming increasingly short of breath in the summer .  States that he works out regularly.  He states that he had epigastric pain and mild dysphagia and took Pepto-Bismol 1-2 times a week for a few weeks.  He noticed that his stool was black but attributed that to the Pepto-Bismol.  He does not take medications.  Denies a history of NSAID use.  He does not use tobacco products or alcohol.  He went to the emergency room in 2023 dyspnea and tachycardia.  His hemoglobin was noted to be 2.3 -likely iron deficiency anemia secondary to blood loss.  GI consulted.     He currently feels well.  Denies chest pain, shortness of breath.  He denies reflux, dysphagia, food impaction, epigastric pain, early satiety, nausea or vomiting.  He is moving his bowels 1-2 times a day.  He denies urgency, diarrhea, hematochezia or melena.    Patient's father  from complications of stomach cancer.    Med list notable for  --Protonix, ferrous sulfate, vitamin B12    Labs   Lab Results  "  Component Value Date    WBC 6.6 11/28/2023    HGB 7.9 (L) 11/28/2023    HCT 26.1 (L) 11/28/2023    MCV 75 (L) 11/28/2023     11/28/2023     Lab Results   Component Value Date     11/28/2023    K 3.7 11/28/2023     11/28/2023    CO2 21 11/28/2023    BUN 11 11/28/2023    CREATININE 0.91 11/28/2023    GLUCOSE 100 (H) 11/28/2023    CALCIUM 8.7 11/28/2023      Lab Results   Component Value Date    ALKPHOS 37 11/28/2023    ALKPHOS 47 11/27/2023    ALKPHOS 37 11/26/2023    BILITOT 0.5 11/28/2023    BILITOT 0.7 11/27/2023    BILITOT 0.9 11/26/2023    PROT 5.8 (L) 11/28/2023    PROT 6.4 11/27/2023    PROT 5.4 (L) 11/26/2023    ALT 13 11/28/2023    ALT 18 11/27/2023    ALT 21 11/26/2023    AST 11 11/28/2023    AST 10 11/27/2023    AST 9 11/26/2023      Lab Results   Component Value Date    INR 1.2 (H) 11/25/2023      Lab Results   Component Value Date    IRON 14 (L) 11/27/2023    TIBC 435 11/27/2023      No results found for: \"FERRITIN\"   Lab Results   Component Value Date    TSH 1.58 11/25/2023        Fam Hx - father - stomach CA- age 60.  Denies a family history of colon cancer.  IBD or celiac disease    Previous Endoscopic evaluation:      Colonoscopy-11/27/2023-Dr. Garcia  Impression  The entire colon appeared normal.  Repeat screening colonoscopy in 10 years    2.  EGD-11/26/2023-Dr. Bishop    Findings  The duodenal bulb and 2nd part of the duodenum appeared normal.  There were significant Bulmaro lesions in the proximal stomach without bleeding.  The stomach was otherwise normal.  10 cm sliding hiatal hernia (type I hiatal hernia) with Bulmaro lesions present, confirmed by retroflexion  Possible Samantha-Rich tear at the GEJ on withdrawal.  There was no bleeding on insertion.  Injected with 1.5 ml of Epi with control of bleeding.         REVIEW OF SYSTEMS    Review of Systems   Constitutional:  Negative for appetite change and unexpected weight change.   HENT:  Negative for mouth sores.    Eyes: " "Negative.  Negative for pain and visual disturbance.   Respiratory:  Negative for cough and shortness of breath.    Cardiovascular:  Negative for chest pain.   Gastrointestinal:  Negative for abdominal distention, abdominal pain, anal bleeding, blood in stool, constipation, diarrhea, nausea, rectal pain and vomiting.   Endocrine: Negative.    Genitourinary:  Negative for difficulty urinating.   Musculoskeletal:  Negative for arthralgias, joint swelling and myalgias.   Skin:  Negative for rash.   Allergic/Immunologic: Negative for environmental allergies and food allergies.   Psychiatric/Behavioral:  The patient is not nervous/anxious.           No Known Allergies    No past medical history on file.    No past surgical history on file.    No family history on file.    Social History     Tobacco Use    Smoking status: Never    Smokeless tobacco: Never   Substance Use Topics    Alcohol use: Never       Current Outpatient Medications   Medication Sig Dispense Refill    cyanocobalamin (Vitamin B-12) 1,000 mcg tablet TAKE 1 TABLET (1,000 MCG) BY MOUTH ONCE DAILY 30 tablet 0    ferrous sulfate, 325 mg ferrous sulfate, tablet TAKE 1 TABLET BY MOUTH WITH A MEAL.DO NOT CRUSH,CHEW,OR SPLIT. TAKE WITH A CUP OF ORANGE JUICE 30 tablet 0    pantoprazole (ProtoNix) 40 mg EC tablet TAKE 1 TABLET (40 MG) BY MOUTH 2 TIMES A DAY BEFORE MEALS. DO NOT CRUSH, CHEW, OR SPLIT. 60 tablet 0     No current facility-administered medications for this visit.       PHYSICAL EXAM:  /87 (BP Location: Left arm, Patient Position: Sitting, BP Cuff Size: Large adult)   Pulse 82   Ht 1.778 m (5' 10\")   Wt 84.8 kg (187 lb)   BMI 26.83 kg/m²      Physical Exam  Constitutional:       Appearance: Normal appearance.   HENT:      Head: Normocephalic and atraumatic.      Nose: Nose normal.   Eyes:      Pupils: Pupils are equal, round, and reactive to light.   Cardiovascular:      Rate and Rhythm: Normal rate and regular rhythm.   Pulmonary:      " Effort: Pulmonary effort is normal.      Breath sounds: Normal breath sounds.   Abdominal:      General: Abdomen is flat. Bowel sounds are normal.      Palpations: Abdomen is soft.   Musculoskeletal:         General: Normal range of motion.      Cervical back: Normal range of motion and neck supple.   Skin:     General: Skin is warm and dry.   Neurological:      Mental Status: He is alert and oriented to person, place, and time.   Psychiatric:         Mood and Affect: Mood normal.         Behavior: Behavior normal.          ASSESSMENT    Here for a hospital follow-up visit for history of anemia due to blood loss.  Had an EGD and colonoscopy while hospitalized.  The EGD showed Bulmaro lesions, likely source of GI bleed, 10 cm hiatal hernia.  The colonoscopy was normal.  Advised repeat colonoscopy in 10 years for surveillance.  Patient received 5 units of packed red blood cells while hospitalized and 1 Venofer infusion.  He was given a B-12 injection.  Currently taking pantoprazole 40 mg twice a day, ferrous sulfate 325 mg daily and vitamin B12 1000 mcg daily.  Not currently experiencing any GI alarm symptoms.  Will need to repeat CBC within a 2 months,  referred for surveillance EGD within 2 months,  referred to a general surgeon for hiatal hernia surgery evaluation      PLAN    Check blood test in February 2024  Continue pantoprazole 40 mg-take this medication twice a day-30 minutes before breakfast and 30 minutes before supper.  I have refilled the prescription for you today. Sent in for #60, Refill -#0   Schedule upper endoscopy (EGD) -[facility] for surveillance of peptic ulcer disease.  Please follow the prep instructions closely.  You will need a .  An anesthesia professional be with you during the procedure to keep you comfortable  Referred to the general surgery clinic for evaluation of 10 cm hiatal hernia  Continue ferrous sulfate 325 mg-take 1 daily with food and with vitamin C  Continue  vitamin B12 1000 mcg - 1 tablet daily  Follow-up in GI clinic after EGD or per physician recommendation    TUYET Rodriguez, ,CNP       Date: 1/10/2024  Time: 9:38 AM

## 2024-02-07 ENCOUNTER — PATIENT OUTREACH (OUTPATIENT)
Dept: PRIMARY CARE | Facility: CLINIC | Age: 34
End: 2024-02-07
Payer: COMMERCIAL

## 2024-03-01 ENCOUNTER — TELEPHONE (OUTPATIENT)
Dept: GASTROENTEROLOGY | Facility: CLINIC | Age: 34
End: 2024-03-01
Payer: COMMERCIAL

## 2024-03-01 NOTE — TELEPHONE ENCOUNTER
Left message for patient to call. We need to reschedule his appointment on 3/13 with Wanda Weir CNP. She will not be available.

## 2024-03-11 ENCOUNTER — TELEPHONE (OUTPATIENT)
Dept: GASTROENTEROLOGY | Facility: CLINIC | Age: 34
End: 2024-03-11
Payer: COMMERCIAL

## 2024-03-13 ENCOUNTER — APPOINTMENT (OUTPATIENT)
Dept: GASTROENTEROLOGY | Facility: CLINIC | Age: 34
End: 2024-03-13
Payer: COMMERCIAL

## 2024-03-28 ENCOUNTER — LAB (OUTPATIENT)
Dept: LAB | Facility: LAB | Age: 34
End: 2024-03-28
Payer: COMMERCIAL

## 2024-03-28 DIAGNOSIS — D64.9 ANEMIA, UNSPECIFIED TYPE: ICD-10-CM

## 2024-03-28 DIAGNOSIS — D50.0 IRON DEFICIENCY ANEMIA DUE TO CHRONIC BLOOD LOSS: ICD-10-CM

## 2024-03-28 DIAGNOSIS — K92.2 UPPER GI BLEED: ICD-10-CM

## 2024-03-28 LAB
ERYTHROCYTE [DISTWIDTH] IN BLOOD BY AUTOMATED COUNT: 16.9 % (ref 11.5–14.5)
FERRITIN SERPL-MCNC: 18 NG/ML (ref 20–300)
HCT VFR BLD AUTO: 41.3 % (ref 41–52)
HGB BLD-MCNC: 12.6 G/DL (ref 13.5–17.5)
IRON SATN MFR SERPL: 6 % (ref 25–45)
IRON SERPL-MCNC: 28 UG/DL (ref 35–150)
MCH RBC QN AUTO: 25.7 PG (ref 26–34)
MCHC RBC AUTO-ENTMCNC: 30.5 G/DL (ref 32–36)
MCV RBC AUTO: 84 FL (ref 80–100)
NRBC BLD-RTO: 0 /100 WBCS (ref 0–0)
PLATELET # BLD AUTO: 445 X10*3/UL (ref 150–450)
RBC # BLD AUTO: 4.9 X10*6/UL (ref 4.5–5.9)
TIBC SERPL-MCNC: 476 UG/DL (ref 240–445)
TTG IGA SER IA-ACNC: <1 U/ML
UIBC SERPL-MCNC: 448 UG/DL (ref 110–370)
WBC # BLD AUTO: 4.4 X10*3/UL (ref 4.4–11.3)

## 2024-03-28 PROCEDURE — 85027 COMPLETE CBC AUTOMATED: CPT

## 2024-03-28 PROCEDURE — 82728 ASSAY OF FERRITIN: CPT

## 2024-03-28 PROCEDURE — 36415 COLL VENOUS BLD VENIPUNCTURE: CPT

## 2024-03-28 PROCEDURE — 83550 IRON BINDING TEST: CPT

## 2024-03-28 PROCEDURE — 83540 ASSAY OF IRON: CPT

## 2024-03-28 PROCEDURE — 83516 IMMUNOASSAY NONANTIBODY: CPT

## 2024-04-30 ENCOUNTER — HOSPITAL ENCOUNTER (OUTPATIENT)
Dept: GASTROENTEROLOGY | Facility: HOSPITAL | Age: 34
Setting detail: OUTPATIENT SURGERY
Discharge: HOME | End: 2024-04-30
Payer: COMMERCIAL

## 2024-04-30 ENCOUNTER — ANESTHESIA (OUTPATIENT)
Dept: GASTROENTEROLOGY | Facility: HOSPITAL | Age: 34
End: 2024-04-30
Payer: COMMERCIAL

## 2024-04-30 ENCOUNTER — ANESTHESIA EVENT (OUTPATIENT)
Dept: GASTROENTEROLOGY | Facility: HOSPITAL | Age: 34
End: 2024-04-30
Payer: COMMERCIAL

## 2024-04-30 VITALS
HEART RATE: 99 BPM | DIASTOLIC BLOOD PRESSURE: 82 MMHG | OXYGEN SATURATION: 100 % | SYSTOLIC BLOOD PRESSURE: 140 MMHG | TEMPERATURE: 97.5 F | RESPIRATION RATE: 20 BRPM

## 2024-04-30 DIAGNOSIS — K44.9 HIATAL HERNIA: ICD-10-CM

## 2024-04-30 DIAGNOSIS — K27.4 PEPTIC ULCER DISEASE WITH HEMORRHAGE: ICD-10-CM

## 2024-04-30 DIAGNOSIS — K92.2 UPPER GI BLEED: Primary | ICD-10-CM

## 2024-04-30 DIAGNOSIS — D50.0 IRON DEFICIENCY ANEMIA DUE TO CHRONIC BLOOD LOSS: ICD-10-CM

## 2024-04-30 PROCEDURE — A43239 PR EDG TRANSORAL BIOPSY SINGLE/MULTIPLE: Performed by: NURSE ANESTHETIST, CERTIFIED REGISTERED

## 2024-04-30 PROCEDURE — 0753T DGTZ GLS MCRSCP SLD LEVEL IV: CPT | Mod: TC,STJLAB,WESLAB | Performed by: INTERNAL MEDICINE

## 2024-04-30 PROCEDURE — 2500000005 HC RX 250 GENERAL PHARMACY W/O HCPCS: Performed by: NURSE ANESTHETIST, CERTIFIED REGISTERED

## 2024-04-30 PROCEDURE — 2500000004 HC RX 250 GENERAL PHARMACY W/ HCPCS (ALT 636 FOR OP/ED): Performed by: INTERNAL MEDICINE

## 2024-04-30 PROCEDURE — 7100000009 HC PHASE TWO TIME - INITIAL BASE CHARGE

## 2024-04-30 PROCEDURE — 7100000010 HC PHASE TWO TIME - EACH INCREMENTAL 1 MINUTE

## 2024-04-30 PROCEDURE — A43239 PR EDG TRANSORAL BIOPSY SINGLE/MULTIPLE: Performed by: ANESTHESIOLOGY

## 2024-04-30 PROCEDURE — 3700000001 HC GENERAL ANESTHESIA TIME - INITIAL BASE CHARGE

## 2024-04-30 PROCEDURE — 3700000002 HC GENERAL ANESTHESIA TIME - EACH INCREMENTAL 1 MINUTE

## 2024-04-30 PROCEDURE — 43239 EGD BIOPSY SINGLE/MULTIPLE: CPT | Performed by: INTERNAL MEDICINE

## 2024-04-30 PROCEDURE — 88305 TISSUE EXAM BY PATHOLOGIST: CPT | Performed by: PATHOLOGY

## 2024-04-30 PROCEDURE — 2500000004 HC RX 250 GENERAL PHARMACY W/ HCPCS (ALT 636 FOR OP/ED): Performed by: NURSE ANESTHETIST, CERTIFIED REGISTERED

## 2024-04-30 PROCEDURE — 7100000001 HC RECOVERY ROOM TIME - INITIAL BASE CHARGE

## 2024-04-30 PROCEDURE — 7100000002 HC RECOVERY ROOM TIME - EACH INCREMENTAL 1 MINUTE

## 2024-04-30 RX ORDER — HYDROCODONE BITARTRATE AND ACETAMINOPHEN 5; 325 MG/1; MG/1
1 TABLET ORAL EVERY 4 HOURS PRN
OUTPATIENT
Start: 2024-04-30

## 2024-04-30 RX ORDER — LIDOCAINE HYDROCHLORIDE 20 MG/ML
INJECTION, SOLUTION EPIDURAL; INFILTRATION; INTRACAUDAL; PERINEURAL AS NEEDED
Status: DISCONTINUED | OUTPATIENT
Start: 2024-04-30 | End: 2024-04-30

## 2024-04-30 RX ORDER — PROPOFOL 10 MG/ML
INJECTION, EMULSION INTRAVENOUS CONTINUOUS PRN
Status: DISCONTINUED | OUTPATIENT
Start: 2024-04-30 | End: 2024-04-30

## 2024-04-30 RX ORDER — MIDAZOLAM HYDROCHLORIDE 1 MG/ML
1 INJECTION, SOLUTION INTRAMUSCULAR; INTRAVENOUS ONCE AS NEEDED
OUTPATIENT
Start: 2024-04-30

## 2024-04-30 RX ORDER — BISMUTH SUBSALICYLATE 262 MG
1 TABLET,CHEWABLE ORAL DAILY
COMMUNITY

## 2024-04-30 RX ORDER — HYDRALAZINE HYDROCHLORIDE 20 MG/ML
5 INJECTION INTRAMUSCULAR; INTRAVENOUS EVERY 30 MIN PRN
OUTPATIENT
Start: 2024-04-30

## 2024-04-30 RX ORDER — FENTANYL CITRATE 50 UG/ML
INJECTION, SOLUTION INTRAMUSCULAR; INTRAVENOUS AS NEEDED
Status: DISCONTINUED | OUTPATIENT
Start: 2024-04-30 | End: 2024-04-30

## 2024-04-30 RX ORDER — SODIUM CHLORIDE, SODIUM LACTATE, POTASSIUM CHLORIDE, CALCIUM CHLORIDE 600; 310; 30; 20 MG/100ML; MG/100ML; MG/100ML; MG/100ML
20 INJECTION, SOLUTION INTRAVENOUS CONTINUOUS
Status: DISCONTINUED | OUTPATIENT
Start: 2024-04-30 | End: 2024-05-01 | Stop reason: HOSPADM

## 2024-04-30 RX ORDER — DIPHENHYDRAMINE HYDROCHLORIDE 50 MG/ML
12.5 INJECTION INTRAMUSCULAR; INTRAVENOUS ONCE AS NEEDED
OUTPATIENT
Start: 2024-04-30

## 2024-04-30 RX ORDER — MIDAZOLAM HYDROCHLORIDE 1 MG/ML
INJECTION, SOLUTION INTRAMUSCULAR; INTRAVENOUS AS NEEDED
Status: DISCONTINUED | OUTPATIENT
Start: 2024-04-30 | End: 2024-04-30

## 2024-04-30 RX ORDER — LABETALOL HYDROCHLORIDE 5 MG/ML
5 INJECTION, SOLUTION INTRAVENOUS
OUTPATIENT
Start: 2024-04-30

## 2024-04-30 RX ORDER — HYDROMORPHONE HYDROCHLORIDE 1 MG/ML
1 INJECTION, SOLUTION INTRAMUSCULAR; INTRAVENOUS; SUBCUTANEOUS EVERY 5 MIN PRN
OUTPATIENT
Start: 2024-04-30

## 2024-04-30 RX ORDER — SODIUM CHLORIDE, SODIUM LACTATE, POTASSIUM CHLORIDE, CALCIUM CHLORIDE 600; 310; 30; 20 MG/100ML; MG/100ML; MG/100ML; MG/100ML
100 INJECTION, SOLUTION INTRAVENOUS CONTINUOUS
OUTPATIENT
Start: 2024-04-30

## 2024-04-30 RX ORDER — ESMOLOL HYDROCHLORIDE 10 MG/ML
INJECTION INTRAVENOUS AS NEEDED
Status: DISCONTINUED | OUTPATIENT
Start: 2024-04-30 | End: 2024-04-30

## 2024-04-30 RX ORDER — METOPROLOL TARTRATE 1 MG/ML
INJECTION, SOLUTION INTRAVENOUS AS NEEDED
Status: DISCONTINUED | OUTPATIENT
Start: 2024-04-30 | End: 2024-04-30

## 2024-04-30 RX ORDER — ALBUTEROL SULFATE 0.83 MG/ML
2.5 SOLUTION RESPIRATORY (INHALATION)
OUTPATIENT
Start: 2024-04-30

## 2024-04-30 RX ORDER — METOCLOPRAMIDE HYDROCHLORIDE 5 MG/ML
10 INJECTION INTRAMUSCULAR; INTRAVENOUS ONCE AS NEEDED
OUTPATIENT
Start: 2024-04-30

## 2024-04-30 RX ADMIN — PROPOFOL 30000 MCG: 10 INJECTION, EMULSION INTRAVENOUS at 13:10

## 2024-04-30 RX ADMIN — LIDOCAINE HYDROCHLORIDE 50 MG: 20 INJECTION, SOLUTION EPIDURAL; INFILTRATION; INTRACAUDAL; PERINEURAL at 13:09

## 2024-04-30 RX ADMIN — METOPROLOL TARTRATE 2 MG: 5 INJECTION, SOLUTION INTRAVENOUS at 13:44

## 2024-04-30 RX ADMIN — PROPOFOL 150 MCG/KG/MIN: 10 INJECTION, EMULSION INTRAVENOUS at 13:09

## 2024-04-30 RX ADMIN — ESMOLOL HYDROCHLORIDE 20 MG: 100 INJECTION, SOLUTION INTRAVENOUS at 13:14

## 2024-04-30 RX ADMIN — PROPOFOL 30000 MCG: 10 INJECTION, EMULSION INTRAVENOUS at 13:11

## 2024-04-30 RX ADMIN — SODIUM CHLORIDE, POTASSIUM CHLORIDE, SODIUM LACTATE AND CALCIUM CHLORIDE: 600; 310; 30; 20 INJECTION, SOLUTION INTRAVENOUS at 13:04

## 2024-04-30 RX ADMIN — FENTANYL CITRATE 25 MCG: 50 INJECTION, SOLUTION INTRAMUSCULAR; INTRAVENOUS at 13:27

## 2024-04-30 RX ADMIN — PROPOFOL 181 MG: 10 INJECTION, EMULSION INTRAVENOUS at 13:22

## 2024-04-30 RX ADMIN — MIDAZOLAM 2 MG: 1 INJECTION INTRAMUSCULAR; INTRAVENOUS at 13:09

## 2024-04-30 ASSESSMENT — PAIN - FUNCTIONAL ASSESSMENT
PAIN_FUNCTIONAL_ASSESSMENT: 0-10

## 2024-04-30 ASSESSMENT — PAIN SCALES - GENERAL
PAINLEVEL_OUTOF10: 0 - NO PAIN

## 2024-04-30 ASSESSMENT — COLUMBIA-SUICIDE SEVERITY RATING SCALE - C-SSRS
2. HAVE YOU ACTUALLY HAD ANY THOUGHTS OF KILLING YOURSELF?: NO
6. HAVE YOU EVER DONE ANYTHING, STARTED TO DO ANYTHING, OR PREPARED TO DO ANYTHING TO END YOUR LIFE?: NO
1. IN THE PAST MONTH, HAVE YOU WISHED YOU WERE DEAD OR WISHED YOU COULD GO TO SLEEP AND NOT WAKE UP?: NO

## 2024-04-30 NOTE — PRE-SEDATION DOCUMENTATION
Patient: Daryl Win  MRN: [id number]    Pre-sedation Evaluation:  Sedation necessary for: Immobility and Analgesia  Requesting service: GI service    History of Present Illness: PUD.      Past Medical History:   Diagnosis Date    Asthma ([facility])     GERD (gastroesophageal reflux disease)        Principle problems:  Patient Active Problem List    Diagnosis Date Noted    Anemia 11/26/2023    Upper GI bleed 11/25/2023     Allergies:  No Known Allergies  PTA/Current Medications:  (Not in a hospital admission)    Current Outpatient Medications   Medication Sig Dispense Refill    multivitamin tablet Take 1 tablet by mouth once daily.      pantoprazole (ProtoNix) 40 mg EC tablet Take 1 tablet (40 mg) by mouth 2 times a day before meals. Do not crush, chew, or split. 60 tablet 0    ferrous sulfate, 325 mg ferrous sulfate, tablet TAKE 1 TABLET BY MOUTH WITH A MEAL.DO NOT CRUSH,CHEW,OR SPLIT. TAKE WITH A CUP OF ORANGE JUICE (Patient not taking: Reported on 4/30/2024) 30 tablet 0     Current Facility-Administered Medications   Medication Dose Route Frequency Provider Last Rate Last Admin    lactated Ringer's infusion  20 mL/hr intravenous Continuous Robert Dumont MD         Past Surgical History:   has a past surgical history that includes [place] tooth extraction.    Recent sedation/surgery (24 hours) No    Review of Systems:  Please check all that apply: No significant medical history        NPO guidelines met: Yes    Physical Exam    Airway   Cardiovascular - normal exam  Rhythm: regular  Rate: normal     Dental    Pulmonary - normal exam  Breath sounds clear to auscultation         Plan    ASA 3     Moderate

## 2024-04-30 NOTE — DISCHARGE INSTRUCTIONS

## 2024-04-30 NOTE — ANESTHESIA PREPROCEDURE EVALUATION
Patient: Daryl Win    Procedure Information       Date/Time: 04/30/24 1320    Scheduled providers: Robert Dumont MD    Procedure: EGD    Location: [facility] [place]            Relevant Problems   GI   (+) Upper GI bleed      Hematology   (+) Anemia       Clinical information reviewed:   Tobacco  Allergies  Meds   Med Hx  Surg Hx   Fam Hx  Soc Hx        NPO Detail:  NPO/Void Status  Carbohydrate Drink Given Prior to Surgery? : N  Date of Last Liquid: 04/29/24  Time of Last Liquid: 2100  Date of Last Solid: 04/29/24  Time of Last Solid: 1900  Last Intake Type: Clear fluids  Time of Last Void: 1130         Physical Exam    Airway  Mallampati: II  TM distance: >3 FB  Neck ROM: full  Comments: Short neck   Cardiovascular - normal exam     Dental    Pulmonary - normal exam     Abdominal - normal exam           Vitals:    04/30/24 1241   BP: 152/89   Pulse: 105   Resp: 18   Temp: 36.1 °C (97 °F)   SpO2: 99%       Past Surgical History:   Procedure Laterality Date    WISDOM TOOTH EXTRACTION       Past Medical History:   Diagnosis Date    Asthma ([facility])     GERD (gastroesophageal reflux disease)        Current Outpatient Medications:     multivitamin tablet, Take 1 tablet by mouth once daily., Disp: , Rfl:     pantoprazole (ProtoNix) 40 mg EC tablet, Take 1 tablet (40 mg) by mouth 2 times a day before meals. Do not crush, chew, or split., Disp: 60 tablet, Rfl: 0    ferrous sulfate, 325 mg ferrous sulfate, tablet, TAKE 1 TABLET BY MOUTH WITH A MEAL.DO NOT CRUSH,CHEW,OR SPLIT. TAKE WITH A CUP OF ORANGE JUICE (Patient not taking: Reported on 4/30/2024), Disp: 30 tablet, Rfl: 0  Prior to Admission medications    Medication Sig Start Date End Date Taking? Authorizing Provider   multivitamin tablet Take 1 tablet by mouth once daily.   Yes Historical Provider, MD   pantoprazole (ProtoNix) 40 mg EC tablet Take 1 tablet (40 mg) by mouth 2 times a day before meals. Do not crush, chew, or split. 1/10/24 4/30/24  Yes Wanda Weir, APRN-CNP   ferrous sulfate, 325 mg ferrous sulfate, tablet TAKE 1 TABLET BY MOUTH WITH A MEAL.DO NOT CRUSH,CHEW,OR SPLIT. TAKE WITH A CUP OF ORANGE JUICE  Patient not taking: Reported on 4/30/2024 12/28/23   Ben Jordan MD     No Known Allergies  Social History     Tobacco Use    Smoking status: Never    Smokeless tobacco: Never   Substance Use Topics    Alcohol use: Yes     Alcohol/week: 3.0 standard drinks of alcohol     Types: 3 Cans of beer per week         Chemistry    Lab Results   Component Value Date/Time     11/28/2023 0354    K 3.7 11/28/2023 0354     11/28/2023 0354    CO2 21 11/28/2023 0354    BUN 11 11/28/2023 0354    CREATININE 0.91 11/28/2023 0354    Lab Results   Component Value Date/Time    CALCIUM 8.7 11/28/2023 0354    ALKPHOS 37 11/28/2023 0354    AST 11 11/28/2023 0354    ALT 13 11/28/2023 0354    BILITOT 0.5 11/28/2023 0354          Lab Results   Component Value Date/Time    WBC 4.4 03/28/2024 0843    HGB 12.6 (L) 03/28/2024 0843    HCT 41.3 03/28/2024 0843     03/28/2024 0843     Lab Results   Component Value Date/Time    PROTIME 13.1 (H) 11/25/2023 1814    INR 1.2 (H) 11/25/2023 1814     Encounter Date: 11/25/23   ECG 12 lead   Result Value    Ventricular Rate 139    Atrial Rate 139    MI Interval 136    QRS Duration 76    QT Interval 288    QTC Calculation(Bazett) 438    P Axis -3    R Axis 2    T Axis -20    QRS Count 23    Q Onset 222    P Onset 154    P Offset 204    T Offset 366    QTC Fredericia 381    Narrative    Sinus tachycardia  Nonspecific ST and T wave abnormality  Abnormal ECG  No previous ECGs available  Confirmed by Noman Mendez ([id number]) on 12/1/2023 1:57:09 PM        Anesthesia Plan    History of general anesthesia?: no  History of complications of general anesthesia?: no    ASA 2     MAC     intravenous induction   Anesthetic plan and risks discussed with patient.    Plan discussed with CRNA.

## 2024-04-30 NOTE — ANESTHESIA POSTPROCEDURE EVALUATION
Patient: Daryl Win    Procedure Summary       Date: 04/30/24 Room / Location: [facility] [place]    Anesthesia Start: 1304 Anesthesia Stop: 1353    Procedure: EGD Diagnosis:       Upper GI bleed      Iron deficiency anemia due to chronic blood loss      Peptic ulcer disease with hemorrhage      Hiatal hernia      Upper GI bleed    Scheduled Providers: Robert Dumont MD Responsible Provider: Jamar Guy MD    Anesthesia Type: MAC ASA Status: 2            Anesthesia Type: MAC    Vitals Value Taken Time   /81 04/30/24 1415   Temp 36.5 °C (97.7 °F) 04/30/24 1415   Pulse 98 04/30/24 1415   Resp 22 04/30/24 1415   SpO2 100 % 04/30/24 1415       Anesthesia Post Evaluation    Patient location during evaluation: PACU  Patient participation: complete - patient participated  Level of consciousness: sleepy but conscious  Pain management: satisfactory to patient  Airway patency: patent  Cardiovascular status: acceptable  Respiratory status: acceptable  Hydration status: euvolemic  Postoperative Nausea and Vomiting: none        No notable events documented.

## 2024-04-30 NOTE — ANESTHESIA PROCEDURE NOTES
Airway  Date/Time: 4/30/2024 1:24 PM  Urgency: elective    Airway not difficult    Staffing  Performed: CRNA   Authorized by: Jamar Guy MD    Performed by: TUYET Espino-CHARITY  Patient location during procedure: OR    Indications and Patient Condition  Indications for airway management: anesthesia  Spontaneous Ventilation: absent  Sedation level: deep  Preoxygenated: yes  Patient position: sniffing  Mask difficulty assessment: 1 - vent by mask    Final Airway Details  Final airway type: endotracheal airway      Successful airway: ETT  Cuffed: yes   Successful intubation technique: direct laryngoscopy  Facilitating devices/methods: intubating stylet  Endotracheal tube insertion site: oral  Blade: Johnnie  Blade size: #4  ETT size (mm): 7.5  Cormack-Lehane Classification: grade I - full view of glottis  Placement verified by: capnometry   Measured from: teeth  ETT to teeth (cm): 22  Number of attempts at approach: 1

## 2024-05-07 LAB
LABORATORY COMMENT REPORT: NORMAL
PATH REPORT.FINAL DX SPEC: NORMAL
PATH REPORT.GROSS SPEC: NORMAL
PATH REPORT.RELEVANT HX SPEC: NORMAL
PATH REPORT.TOTAL CANCER: NORMAL

## 2024-05-20 DIAGNOSIS — K92.2 UPPER GI BLEED: ICD-10-CM

## 2024-05-20 DIAGNOSIS — K44.9 LARGE HIATAL HERNIA: Primary | ICD-10-CM

## 2024-05-22 PROBLEM — D50.0 IRON DEFICIENCY ANEMIA DUE TO CHRONIC BLOOD LOSS: Status: ACTIVE | Noted: 2023-11-28

## 2024-05-22 PROBLEM — K27.4 PEPTIC ULCER WITH HEMORRHAGE: Status: ACTIVE | Noted: 2024-05-22

## 2024-05-22 PROBLEM — R00.0 TACHYCARDIA: Status: RESOLVED | Noted: 2024-05-22 | Resolved: 2024-05-22

## 2024-05-22 RX ORDER — TALC
3 POWDER (GRAM) TOPICAL DAILY PRN
COMMUNITY
Start: 2023-11-28

## 2024-05-22 NOTE — PROGRESS NOTES
NPV- LARGE HIATAL HERNIA     GERD Health Related Quality of Life (HRQL) Questionnaire    Heartburn Questions  1. How bad is the heartburn? Response Scale: 2 = Noticeable, bothersome but not everyday  2. Heartburn when lying down? Response Scale: 4 = Bothersome and affects daily activities  3. Heartburn when standing up? Response Scale: 2 = Noticeable, bothersome but not everyday  4. Heartburn after meals? Response Scale: 1 = Noticeable, but no bothersome  5. Does heartburn change your diet? Response Scale: 5 = Incapacitating to do daily activities  6. Does heartburn wake you from sleep? Response Scale: 0 = No Symptoms    7. Do you have difficulty swallowing? Response Scale: 2 = Noticeable, bothersome but not everyday  8. Do you have pain with swallowing? Response Scale: 0 = No Symptoms  9. Do you have gassy or bloating feelings? Response Scale: 3 = Bothersome daily  10. If you take medication, does this affect your daily life? Response Scale: 2 = Noticeable, bothersome but not everyday    Regurgitation Questions  11. How bad is the regurgitation? Response Scale: 0 = No Symptoms  12. Regurgitation when lying down? Response Scale: 0 = No Symptoms  13. Regurgitation when standing up? Response Scale: 0 = No Symptoms  14. Regurgitation after meals? Response Scale: 0 = No Symptoms  15. Does regurgitation change your diet? Response Scale: 0 = No Symptoms  16. Does regurgitation wake you from sleep? Response Scale: 0 = No Symptoms    Are you currently taking any medications for heartburn or GERD? PPI: No  How satisfied are you with your present condition? Satisfaction: Neutral    Total score (sum questions 1-16): 21  Greatest possible score 80 (worst symptoms).  Lowest possible score 0 (no symptoms).    Heartburn score (sum questions 1-6): 14  Worst heartburn symptoms: 30.  No heartburn symptoms: 0.  Score less than or equal to 12 with each individual question not exceeding 2 indicate heartburn elimination.    Regurgitation  score (sum questions 11-16): 0  Worst regurgitation symptoms: 30.  No regurgitation symptoms: 0.  Score less than or equal to 12 with each individual question not exceeding 2 indicate regurgitation elimination.

## 2024-05-23 ENCOUNTER — PREP FOR PROCEDURE (OUTPATIENT)
Dept: SURGERY | Facility: CLINIC | Age: 34
End: 2024-05-23

## 2024-05-23 ENCOUNTER — OFFICE VISIT (OUTPATIENT)
Dept: SURGERY | Facility: CLINIC | Age: 34
End: 2024-05-23
Payer: COMMERCIAL

## 2024-05-23 VITALS
HEIGHT: 70 IN | HEART RATE: 82 BPM | DIASTOLIC BLOOD PRESSURE: 72 MMHG | SYSTOLIC BLOOD PRESSURE: 160 MMHG | BODY MASS INDEX: 27.92 KG/M2 | RESPIRATION RATE: 17 BRPM | WEIGHT: 195 LBS | TEMPERATURE: 98.2 F

## 2024-05-23 DIAGNOSIS — K44.9 LARGE HIATAL HERNIA: ICD-10-CM

## 2024-05-23 DIAGNOSIS — K25.3 ACUTE CAMERON ULCER: ICD-10-CM

## 2024-05-23 DIAGNOSIS — K92.2 UPPER GI BLEED: ICD-10-CM

## 2024-05-23 DIAGNOSIS — K44.9 HIATAL HERNIA: Primary | ICD-10-CM

## 2024-05-23 PROCEDURE — 99203 OFFICE O/P NEW LOW 30 MIN: CPT | Performed by: SURGERY

## 2024-05-23 PROCEDURE — 1036F TOBACCO NON-USER: CPT | Performed by: SURGERY

## 2024-05-23 RX ORDER — SODIUM CHLORIDE, SODIUM LACTATE, POTASSIUM CHLORIDE, CALCIUM CHLORIDE 600; 310; 30; 20 MG/100ML; MG/100ML; MG/100ML; MG/100ML
100 INJECTION, SOLUTION INTRAVENOUS CONTINUOUS
Status: CANCELLED | OUTPATIENT
Start: 2024-06-10

## 2024-05-23 RX ORDER — GABAPENTIN 600 MG/1
600 TABLET ORAL ONCE
Status: CANCELLED | OUTPATIENT
Start: 2024-05-23 | End: 2024-05-23

## 2024-05-23 RX ORDER — CELECOXIB 400 MG/1
400 CAPSULE ORAL ONCE
Status: CANCELLED | OUTPATIENT
Start: 2024-05-23 | End: 2024-05-23

## 2024-05-23 RX ORDER — ACETAMINOPHEN 325 MG/1
975 TABLET ORAL ONCE
Status: CANCELLED | OUTPATIENT
Start: 2024-05-23 | End: 2024-05-23

## 2024-05-23 RX ORDER — CEFAZOLIN SODIUM 2 G/100ML
2 INJECTION, SOLUTION INTRAVENOUS ONCE
Status: CANCELLED | OUTPATIENT
Start: 2024-06-10 | End: 2024-05-23

## 2024-05-23 RX ORDER — SCOLOPAMINE TRANSDERMAL SYSTEM 1 MG/1
1 PATCH, EXTENDED RELEASE TRANSDERMAL
Status: CANCELLED | OUTPATIENT
Start: 2024-05-23 | End: 2024-05-26

## 2024-05-23 RX ORDER — HEPARIN SODIUM 5000 [USP'U]/ML
5000 INJECTION, SOLUTION INTRAVENOUS; SUBCUTANEOUS ONCE
Status: CANCELLED | OUTPATIENT
Start: 2024-05-23 | End: 2024-05-23

## 2024-05-23 ASSESSMENT — ENCOUNTER SYMPTOMS
RESPIRATORY NEGATIVE: 1
APPETITE CHANGE: 0
EYES NEGATIVE: 1
CONSTITUTIONAL NEGATIVE: 1
CARDIOVASCULAR NEGATIVE: 1
BLOOD IN STOOL: 1
NAUSEA: 1

## 2024-05-23 ASSESSMENT — PAIN SCALES - GENERAL: PAINLEVEL: 0-NO PAIN

## 2024-05-23 NOTE — PROGRESS NOTES
Subjective   Patient ID: Daryl Win is a 34 y.o. male who presents for New Patient Visit (NPV-LARGE HIATAL HERNIA).  HPI  35 YO M BMI 28. Iron deficiency anemia due to chronic blood loss, Upper GI bleed, Peptic ulcer disease with hemorrhage. Previously had dysphagia years ago but that has resolved. Was on a bear hunt and became severely anemic. Recent hospitalization for upper GI bleed discharged 11/28/2024.  Patient had an EGD and colonoscopy while hospitalized.  The EGD showed Bulmaro lesions which were the likely source of bleeding and a large hiatal hernia.  He received 5 units of blood.  He also received IV Venofer and given B12 injection.  He transitioned to an oral iron supplement and was discharged.  He was discharged on Protonix 40 mg twice a day and advised to follow-up in the GI clinic.  He is currently taking ferrous sulfate once daily and vitamin B12 1000 mcg daily.     EGD 11/26/2023. There were significant Bulmaro lesions in the proximal stomach without bleeding.  The stomach was otherwise normal. 10 cm sliding hiatal hernia (type I hiatal hernia) with Bulmaro lesions present, confirmed by retroflexion    EGD 04/30/2024. 6 cm herniation of gastric fundus into thoracic cavity (type II hiatal hernia) without Bulmaro lesions present, confirmed by retroflexion. Hill grade IV hiatal hernia. Moderate, patchy edematous and erythematous mucosa in the body of the stomach and antrum, consistent with gastritis; performed cold forceps biopsy. Oxyntic mucosa with proton pump inhibitor effect.     HRQL 21. Some reflux. Really doesn't feel symptoms much. Doesn't have esophagitis. Wants this repaired quickly. Discussed admission, UGI, and puree diet and some common complications from surgery. Only way to prevent recurrence of anemia is to repair the hernia.    Review of Systems   Constitutional: Negative.  Negative for appetite change.   HENT: Negative.     Eyes: Negative.    Respiratory: Negative.      Cardiovascular: Negative.    Gastrointestinal:  Positive for blood in stool and nausea.       Objective   Physical Exam  Constitutional:       Appearance: Normal appearance.   HENT:      Head: Atraumatic.      Nose: Nose normal.      Mouth/Throat:      Mouth: Mucous membranes are moist.   Cardiovascular:      Rate and Rhythm: Normal rate and regular rhythm.   Pulmonary:      Effort: Pulmonary effort is normal.      Breath sounds: Normal breath sounds.   Abdominal:      General: There is no distension.      Palpations: Abdomen is soft.      Tenderness: There is no guarding or rebound.   Skin:     General: Skin is warm.   Neurological:      Mental Status: He is alert. Mental status is at baseline.   Psychiatric:         Mood and Affect: Mood normal.         Thought Content: Thought content normal.         Judgment: Judgment normal.         Assessment/Plan   Problem List Items Addressed This Visit             ICD-10-CM       Gastrointestinal and Abdominal    Upper GI bleed K92.2    Relevant Orders    Case Request Operating Room: Repair Diaphragmatic Hernia Robot-Assisted, Esophagogastroduodenoscopy (Completed)    Large hiatal hernia K44.9    Relevant Orders    Case Request Operating Room: Repair Diaphragmatic Hernia Robot-Assisted, Esophagogastroduodenoscopy (Completed)    Acute Bulmaro ulcer K25.3    Relevant Orders    Case Request Operating Room: Repair Diaphragmatic Hernia Robot-Assisted, Esophagogastroduodenoscopy (Completed)    CBC    Basic Metabolic Panel     Other Visit Diagnoses         Codes    Hiatal hernia    -  Primary K44.9        Will forgo manometry and BRAVO at this time.  Favor gastropexy and partial wrap.  Consent signed.         Ashok Negron MD PhD 05/23/24 2:11 PM

## 2024-05-23 NOTE — H&P (VIEW-ONLY)
Subjective   Patient ID: Daryl Win is a 34 y.o. male who presents for New Patient Visit (NPV-LARGE HIATAL HERNIA).  HPI  33 YO M BMI 28. Iron deficiency anemia due to chronic blood loss, Upper GI bleed, Peptic ulcer disease with hemorrhage. Previously had dysphagia years ago but that has resolved. Was on a bear hunt and became severely anemic. Recent hospitalization for upper GI bleed discharged 11/28/2024.  Patient had an EGD and colonoscopy while hospitalized.  The EGD showed Bulmaro lesions which were the likely source of bleeding and a large hiatal hernia.  He received 5 units of blood.  He also received IV Venofer and given B12 injection.  He transitioned to an oral iron supplement and was discharged.  He was discharged on Protonix 40 mg twice a day and advised to follow-up in the GI clinic.  He is currently taking ferrous sulfate once daily and vitamin B12 1000 mcg daily.     EGD 11/26/2023. There were significant Bulmaro lesions in the proximal stomach without bleeding.  The stomach was otherwise normal. 10 cm sliding hiatal hernia (type I hiatal hernia) with Bulmaro lesions present, confirmed by retroflexion    EGD 04/30/2024. 6 cm herniation of gastric fundus into thoracic cavity (type II hiatal hernia) without Bulmaro lesions present, confirmed by retroflexion. Hill grade IV hiatal hernia. Moderate, patchy edematous and erythematous mucosa in the body of the stomach and antrum, consistent with gastritis; performed cold forceps biopsy. Oxyntic mucosa with proton pump inhibitor effect.     HRQL 21. Some reflux. Really doesn't feel symptoms much. Doesn't have esophagitis. Wants this repaired quickly. Discussed admission, UGI, and puree diet and some common complications from surgery. Only way to prevent recurrence of anemia is to repair the hernia.    Review of Systems   Constitutional: Negative.  Negative for appetite change.   HENT: Negative.     Eyes: Negative.    Respiratory: Negative.      Cardiovascular: Negative.    Gastrointestinal:  Positive for blood in stool and nausea.       Objective   Physical Exam  Constitutional:       Appearance: Normal appearance.   HENT:      Head: Atraumatic.      Nose: Nose normal.      Mouth/Throat:      Mouth: Mucous membranes are moist.   Cardiovascular:      Rate and Rhythm: Normal rate and regular rhythm.   Pulmonary:      Effort: Pulmonary effort is normal.      Breath sounds: Normal breath sounds.   Abdominal:      General: There is no distension.      Palpations: Abdomen is soft.      Tenderness: There is no guarding or rebound.   Skin:     General: Skin is warm.   Neurological:      Mental Status: He is alert. Mental status is at baseline.   Psychiatric:         Mood and Affect: Mood normal.         Thought Content: Thought content normal.         Judgment: Judgment normal.         Assessment/Plan   Problem List Items Addressed This Visit             ICD-10-CM       Gastrointestinal and Abdominal    Upper GI bleed K92.2    Relevant Orders    Case Request Operating Room: Repair Diaphragmatic Hernia Robot-Assisted, Esophagogastroduodenoscopy (Completed)    Large hiatal hernia K44.9    Relevant Orders    Case Request Operating Room: Repair Diaphragmatic Hernia Robot-Assisted, Esophagogastroduodenoscopy (Completed)    Acute Bulmaro ulcer K25.3    Relevant Orders    Case Request Operating Room: Repair Diaphragmatic Hernia Robot-Assisted, Esophagogastroduodenoscopy (Completed)    CBC    Basic Metabolic Panel     Other Visit Diagnoses         Codes    Hiatal hernia    -  Primary K44.9        Will forgo manometry and BRAVO at this time.  Favor gastropexy and partial wrap.  Consent signed.         Ashok Negron MD PhD 05/23/24 2:11 PM

## 2024-06-07 ENCOUNTER — LAB (OUTPATIENT)
Dept: LAB | Facility: LAB | Age: 34
End: 2024-06-07
Payer: COMMERCIAL

## 2024-06-07 DIAGNOSIS — K25.3 ACUTE CAMERON ULCER: ICD-10-CM

## 2024-06-07 LAB
ANION GAP SERPL CALC-SCNC: 14 MMOL/L (ref 10–20)
BUN SERPL-MCNC: 10 MG/DL (ref 6–23)
CALCIUM SERPL-MCNC: 10 MG/DL (ref 8.6–10.3)
CHLORIDE SERPL-SCNC: 104 MMOL/L (ref 98–107)
CO2 SERPL-SCNC: 25 MMOL/L (ref 21–32)
CREAT SERPL-MCNC: 0.77 MG/DL (ref 0.5–1.3)
EGFRCR SERPLBLD CKD-EPI 2021: >90 ML/MIN/1.73M*2
ERYTHROCYTE [DISTWIDTH] IN BLOOD BY AUTOMATED COUNT: 19.1 % (ref 11.5–14.5)
GLUCOSE SERPL-MCNC: 103 MG/DL (ref 74–99)
HCT VFR BLD AUTO: 33.7 % (ref 41–52)
HGB BLD-MCNC: 10.1 G/DL (ref 13.5–17.5)
MCH RBC QN AUTO: 22.9 PG (ref 26–34)
MCHC RBC AUTO-ENTMCNC: 30 G/DL (ref 32–36)
MCV RBC AUTO: 76 FL (ref 80–100)
NRBC BLD-RTO: 0 /100 WBCS (ref 0–0)
PLATELET # BLD AUTO: 475 X10*3/UL (ref 150–450)
POTASSIUM SERPL-SCNC: 4.1 MMOL/L (ref 3.5–5.3)
RBC # BLD AUTO: 4.42 X10*6/UL (ref 4.5–5.9)
SODIUM SERPL-SCNC: 139 MMOL/L (ref 136–145)
WBC # BLD AUTO: 3.4 X10*3/UL (ref 4.4–11.3)

## 2024-06-07 PROCEDURE — 85027 COMPLETE CBC AUTOMATED: CPT

## 2024-06-07 PROCEDURE — 80048 BASIC METABOLIC PNL TOTAL CA: CPT

## 2024-06-07 PROCEDURE — 36415 COLL VENOUS BLD VENIPUNCTURE: CPT

## 2024-06-07 NOTE — PREPROCEDURE INSTRUCTIONS
Current Medications   Medication Instructions    melatonin 3 mg tablet Stop 1 day before surgery    multivitamin tablet Stop 2 days before surgery          NPO Instructions: Nothing to eat after midnight  Additional Instructions: Enter through main entrance of main hospital building, located at [address] [facility]. Proceed to registration, located in the back right hand corner. You will need your ID and insurance card for registration. Please ensure you have a responsible adult to drive you home.     Shower the morning of or night before your procedure. After you shower avoid lotions, powders, deodorants or anything applied to the skin. If you wear contacts or glasses, wear the glasses. If you do not have glasses, please bring a case for your contacts. You may wear hearing aids and dentures, bring a case for them or we will provide one. Make sure you wear something loose and comfortable. Keep in mind your surgery type and wear something that will accommodate incisions or bandages. Please remove all jewelry.   You may have up to 13.5 ounces of clear liquids 2 hours before your instructed ARRIVAL time to the hospital.     For further questions Jose Martin ROCK can be contacted at [phone] between 7AM-3PM.

## 2024-06-10 ENCOUNTER — PREP FOR PROCEDURE (OUTPATIENT)
Dept: SURGERY | Facility: HOSPITAL | Age: 34
End: 2024-06-10

## 2024-06-10 ENCOUNTER — HOSPITAL ENCOUNTER (OUTPATIENT)
Facility: HOSPITAL | Age: 34
Discharge: HOME | End: 2024-06-11
Attending: SURGERY | Admitting: SURGERY
Payer: COMMERCIAL

## 2024-06-10 ENCOUNTER — ANESTHESIA EVENT (OUTPATIENT)
Dept: OPERATING ROOM | Facility: HOSPITAL | Age: 34
End: 2024-06-10
Payer: COMMERCIAL

## 2024-06-10 ENCOUNTER — ANESTHESIA (OUTPATIENT)
Dept: OPERATING ROOM | Facility: HOSPITAL | Age: 34
End: 2024-06-10
Payer: COMMERCIAL

## 2024-06-10 DIAGNOSIS — K44.9 HIATAL HERNIA: Primary | ICD-10-CM

## 2024-06-10 DIAGNOSIS — K92.2 UPPER GI BLEED: ICD-10-CM

## 2024-06-10 DIAGNOSIS — Z01.818 PRE-OP TESTING: ICD-10-CM

## 2024-06-10 DIAGNOSIS — K25.3 ACUTE CAMERON ULCER: ICD-10-CM

## 2024-06-10 DIAGNOSIS — K44.9 LARGE HIATAL HERNIA: ICD-10-CM

## 2024-06-10 DIAGNOSIS — G89.18 ACUTE POSTOPERATIVE PAIN: ICD-10-CM

## 2024-06-10 LAB
ABO GROUP (TYPE) IN BLOOD: NORMAL
ABO GROUP (TYPE) IN BLOOD: NORMAL
ANTIBODY SCREEN: NORMAL
RH FACTOR (ANTIGEN D): NORMAL
RH FACTOR (ANTIGEN D): NORMAL

## 2024-06-10 PROCEDURE — 43235 EGD DIAGNOSTIC BRUSH WASH: CPT | Performed by: SURGERY

## 2024-06-10 PROCEDURE — 36415 COLL VENOUS BLD VENIPUNCTURE: CPT | Performed by: SURGERY

## 2024-06-10 PROCEDURE — 3700000001 HC GENERAL ANESTHESIA TIME - INITIAL BASE CHARGE: Performed by: SURGERY

## 2024-06-10 PROCEDURE — 2500000004 HC RX 250 GENERAL PHARMACY W/ HCPCS (ALT 636 FOR OP/ED): Performed by: ANESTHESIOLOGY

## 2024-06-10 PROCEDURE — 2720000007 HC OR 272 NO HCPCS: Performed by: SURGERY

## 2024-06-10 PROCEDURE — 2500000004 HC RX 250 GENERAL PHARMACY W/ HCPCS (ALT 636 FOR OP/ED)

## 2024-06-10 PROCEDURE — 96372 THER/PROPH/DIAG INJ SC/IM: CPT | Performed by: SURGERY

## 2024-06-10 PROCEDURE — 3700000002 HC GENERAL ANESTHESIA TIME - EACH INCREMENTAL 1 MINUTE: Performed by: SURGERY

## 2024-06-10 PROCEDURE — 7100000002 HC RECOVERY ROOM TIME - EACH INCREMENTAL 1 MINUTE: Performed by: SURGERY

## 2024-06-10 PROCEDURE — 2500000004 HC RX 250 GENERAL PHARMACY W/ HCPCS (ALT 636 FOR OP/ED): Performed by: SURGERY

## 2024-06-10 PROCEDURE — 2500000004 HC RX 250 GENERAL PHARMACY W/ HCPCS (ALT 636 FOR OP/ED): Performed by: ANESTHESIOLOGIST ASSISTANT

## 2024-06-10 PROCEDURE — 3600000009 HC OR TIME - EACH INCREMENTAL 1 MINUTE - PROCEDURE LEVEL FOUR: Performed by: SURGERY

## 2024-06-10 PROCEDURE — 2500000001 HC RX 250 WO HCPCS SELF ADMINISTERED DRUGS (ALT 637 FOR MEDICARE OP): Performed by: STUDENT IN AN ORGANIZED HEALTH CARE EDUCATION/TRAINING PROGRAM

## 2024-06-10 PROCEDURE — 86901 BLOOD TYPING SEROLOGIC RH(D): CPT | Performed by: SURGERY

## 2024-06-10 PROCEDURE — 7100000011 HC EXTENDED STAY RECOVERY HOURLY - NURSING UNIT

## 2024-06-10 PROCEDURE — 43282 LAP PARAESOPH HER RPR W/MESH: CPT | Performed by: SURGERY

## 2024-06-10 PROCEDURE — 7100000001 HC RECOVERY ROOM TIME - INITIAL BASE CHARGE: Performed by: SURGERY

## 2024-06-10 PROCEDURE — 3600000004 HC OR TIME - INITIAL BASE CHARGE - PROCEDURE LEVEL FOUR: Performed by: SURGERY

## 2024-06-10 PROCEDURE — 2500000005 HC RX 250 GENERAL PHARMACY W/O HCPCS

## 2024-06-10 PROCEDURE — 2500000001 HC RX 250 WO HCPCS SELF ADMINISTERED DRUGS (ALT 637 FOR MEDICARE OP): Performed by: SURGERY

## 2024-06-10 PROCEDURE — 2500000005 HC RX 250 GENERAL PHARMACY W/O HCPCS: Performed by: SURGERY

## 2024-06-10 PROCEDURE — 2500000005 HC RX 250 GENERAL PHARMACY W/O HCPCS: Performed by: ANESTHESIOLOGIST ASSISTANT

## 2024-06-10 RX ORDER — ENOXAPARIN SODIUM 100 MG/ML
40 INJECTION SUBCUTANEOUS EVERY 24 HOURS
Status: DISCONTINUED | OUTPATIENT
Start: 2024-06-10 | End: 2024-06-11 | Stop reason: HOSPADM

## 2024-06-10 RX ORDER — ALBUTEROL SULFATE 0.83 MG/ML
2.5 SOLUTION RESPIRATORY (INHALATION) ONCE AS NEEDED
Status: DISCONTINUED | OUTPATIENT
Start: 2024-06-10 | End: 2024-06-10 | Stop reason: HOSPADM

## 2024-06-10 RX ORDER — CEFAZOLIN SODIUM 2 G/100ML
2 INJECTION, SOLUTION INTRAVENOUS ONCE
Status: DISCONTINUED | OUTPATIENT
Start: 2024-06-10 | End: 2024-06-10 | Stop reason: HOSPADM

## 2024-06-10 RX ORDER — ROCURONIUM BROMIDE 10 MG/ML
INJECTION, SOLUTION INTRAVENOUS AS NEEDED
Status: DISCONTINUED | OUTPATIENT
Start: 2024-06-10 | End: 2024-06-10

## 2024-06-10 RX ORDER — PHENYLEPHRINE HCL IN 0.9% NACL 1 MG/10 ML
SYRINGE (ML) INTRAVENOUS AS NEEDED
Status: DISCONTINUED | OUTPATIENT
Start: 2024-06-10 | End: 2024-06-10

## 2024-06-10 RX ORDER — CEFAZOLIN 1 G/1
INJECTION, POWDER, FOR SOLUTION INTRAVENOUS AS NEEDED
Status: DISCONTINUED | OUTPATIENT
Start: 2024-06-10 | End: 2024-06-10

## 2024-06-10 RX ORDER — GABAPENTIN 300 MG/1
600 CAPSULE ORAL ONCE
Status: COMPLETED | OUTPATIENT
Start: 2024-06-10 | End: 2024-06-10

## 2024-06-10 RX ORDER — LIDOCAINE HCL/PF 100 MG/5ML
SYRINGE (ML) INTRAVENOUS AS NEEDED
Status: DISCONTINUED | OUTPATIENT
Start: 2024-06-10 | End: 2024-06-10

## 2024-06-10 RX ORDER — ESMOLOL HYDROCHLORIDE 10 MG/ML
INJECTION INTRAVENOUS AS NEEDED
Status: DISCONTINUED | OUTPATIENT
Start: 2024-06-10 | End: 2024-06-10

## 2024-06-10 RX ORDER — METOCLOPRAMIDE HYDROCHLORIDE 5 MG/ML
10 INJECTION INTRAMUSCULAR; INTRAVENOUS EVERY 6 HOURS SCHEDULED
Status: DISCONTINUED | OUTPATIENT
Start: 2024-06-10 | End: 2024-06-11 | Stop reason: HOSPADM

## 2024-06-10 RX ORDER — PROPOFOL 10 MG/ML
INJECTION, EMULSION INTRAVENOUS AS NEEDED
Status: DISCONTINUED | OUTPATIENT
Start: 2024-06-10 | End: 2024-06-10

## 2024-06-10 RX ORDER — FENTANYL CITRATE 50 UG/ML
INJECTION, SOLUTION INTRAMUSCULAR; INTRAVENOUS AS NEEDED
Status: DISCONTINUED | OUTPATIENT
Start: 2024-06-10 | End: 2024-06-10

## 2024-06-10 RX ORDER — ONDANSETRON HYDROCHLORIDE 2 MG/ML
4 INJECTION, SOLUTION INTRAVENOUS EVERY 6 HOURS
Status: DISCONTINUED | OUTPATIENT
Start: 2024-06-10 | End: 2024-06-11 | Stop reason: HOSPADM

## 2024-06-10 RX ORDER — HEPARIN SODIUM 5000 [USP'U]/ML
5000 INJECTION, SOLUTION INTRAVENOUS; SUBCUTANEOUS ONCE
Status: COMPLETED | OUTPATIENT
Start: 2024-06-10 | End: 2024-06-10

## 2024-06-10 RX ORDER — OXYCODONE HYDROCHLORIDE 5 MG/1
5 TABLET ORAL EVERY 4 HOURS PRN
Status: DISCONTINUED | OUTPATIENT
Start: 2024-06-10 | End: 2024-06-11 | Stop reason: HOSPADM

## 2024-06-10 RX ORDER — HYDROMORPHONE HYDROCHLORIDE 1 MG/ML
INJECTION, SOLUTION INTRAMUSCULAR; INTRAVENOUS; SUBCUTANEOUS
Status: COMPLETED
Start: 2024-06-10 | End: 2024-06-10

## 2024-06-10 RX ORDER — MIDAZOLAM HYDROCHLORIDE 1 MG/ML
INJECTION, SOLUTION INTRAMUSCULAR; INTRAVENOUS AS NEEDED
Status: DISCONTINUED | OUTPATIENT
Start: 2024-06-10 | End: 2024-06-10

## 2024-06-10 RX ORDER — BUPIVACAINE HYDROCHLORIDE 5 MG/ML
INJECTION, SOLUTION PERINEURAL AS NEEDED
Status: DISCONTINUED | OUTPATIENT
Start: 2024-06-10 | End: 2024-06-10 | Stop reason: HOSPADM

## 2024-06-10 RX ORDER — ACETAMINOPHEN 325 MG/1
975 TABLET ORAL ONCE
Status: COMPLETED | OUTPATIENT
Start: 2024-06-10 | End: 2024-06-10

## 2024-06-10 RX ORDER — CELECOXIB 100 MG/1
400 CAPSULE ORAL ONCE
Status: COMPLETED | OUTPATIENT
Start: 2024-06-10 | End: 2024-06-10

## 2024-06-10 RX ORDER — SODIUM CHLORIDE, SODIUM LACTATE, POTASSIUM CHLORIDE, CALCIUM CHLORIDE 600; 310; 30; 20 MG/100ML; MG/100ML; MG/100ML; MG/100ML
100 INJECTION, SOLUTION INTRAVENOUS CONTINUOUS
Status: DISCONTINUED | OUTPATIENT
Start: 2024-06-10 | End: 2024-06-10 | Stop reason: HOSPADM

## 2024-06-10 RX ORDER — ONDANSETRON HYDROCHLORIDE 2 MG/ML
4 INJECTION, SOLUTION INTRAVENOUS ONCE AS NEEDED
Status: DISCONTINUED | OUTPATIENT
Start: 2024-06-10 | End: 2024-06-10 | Stop reason: HOSPADM

## 2024-06-10 RX ORDER — ONDANSETRON HYDROCHLORIDE 2 MG/ML
INJECTION, SOLUTION INTRAVENOUS AS NEEDED
Status: DISCONTINUED | OUTPATIENT
Start: 2024-06-10 | End: 2024-06-10

## 2024-06-10 RX ORDER — HYDROMORPHONE HYDROCHLORIDE 1 MG/ML
INJECTION, SOLUTION INTRAMUSCULAR; INTRAVENOUS; SUBCUTANEOUS AS NEEDED
Status: DISCONTINUED | OUTPATIENT
Start: 2024-06-10 | End: 2024-06-10

## 2024-06-10 RX ORDER — ACETAMINOPHEN 325 MG/1
650 TABLET ORAL EVERY 4 HOURS
Status: DISCONTINUED | OUTPATIENT
Start: 2024-06-10 | End: 2024-06-11 | Stop reason: HOSPADM

## 2024-06-10 RX ORDER — PANTOPRAZOLE SODIUM 40 MG/1
40 TABLET, DELAYED RELEASE ORAL
Status: DISCONTINUED | OUTPATIENT
Start: 2024-06-11 | End: 2024-06-11 | Stop reason: HOSPADM

## 2024-06-10 RX ORDER — SIMETHICONE 80 MG
40 TABLET,CHEWABLE ORAL DAILY
Status: DISCONTINUED | OUTPATIENT
Start: 2024-06-10 | End: 2024-06-11 | Stop reason: HOSPADM

## 2024-06-10 RX ORDER — MEPERIDINE HYDROCHLORIDE 25 MG/ML
INJECTION INTRAMUSCULAR; INTRAVENOUS; SUBCUTANEOUS AS NEEDED
Status: DISCONTINUED | OUTPATIENT
Start: 2024-06-10 | End: 2024-06-10

## 2024-06-10 RX ORDER — TALC
3 POWDER (GRAM) TOPICAL NIGHTLY
Status: DISCONTINUED | OUTPATIENT
Start: 2024-06-10 | End: 2024-06-11 | Stop reason: HOSPADM

## 2024-06-10 RX ORDER — MEPERIDINE HYDROCHLORIDE 25 MG/ML
12.5 INJECTION INTRAMUSCULAR; INTRAVENOUS; SUBCUTANEOUS EVERY 10 MIN PRN
Status: DISCONTINUED | OUTPATIENT
Start: 2024-06-10 | End: 2024-06-10 | Stop reason: HOSPADM

## 2024-06-10 RX ORDER — SCOLOPAMINE TRANSDERMAL SYSTEM 1 MG/1
1 PATCH, EXTENDED RELEASE TRANSDERMAL
Status: DISCONTINUED | OUTPATIENT
Start: 2024-06-10 | End: 2024-06-10

## 2024-06-10 RX ORDER — ACETAMINOPHEN 325 MG/1
650 TABLET ORAL EVERY 4 HOURS PRN
Status: DISCONTINUED | OUTPATIENT
Start: 2024-06-10 | End: 2024-06-10 | Stop reason: HOSPADM

## 2024-06-10 RX ORDER — SODIUM CHLORIDE, SODIUM LACTATE, POTASSIUM CHLORIDE, CALCIUM CHLORIDE 600; 310; 30; 20 MG/100ML; MG/100ML; MG/100ML; MG/100ML
50 INJECTION, SOLUTION INTRAVENOUS CONTINUOUS
Status: DISCONTINUED | OUTPATIENT
Start: 2024-06-10 | End: 2024-06-11

## 2024-06-10 RX ORDER — OXYCODONE HYDROCHLORIDE 5 MG/1
10 TABLET ORAL EVERY 4 HOURS PRN
Status: DISCONTINUED | OUTPATIENT
Start: 2024-06-10 | End: 2024-06-11 | Stop reason: HOSPADM

## 2024-06-10 RX ORDER — SODIUM CHLORIDE, SODIUM LACTATE, POTASSIUM CHLORIDE, CALCIUM CHLORIDE 600; 310; 30; 20 MG/100ML; MG/100ML; MG/100ML; MG/100ML
100 INJECTION, SOLUTION INTRAVENOUS CONTINUOUS
Status: DISCONTINUED | OUTPATIENT
Start: 2024-06-10 | End: 2024-06-10

## 2024-06-10 RX ADMIN — OXYCODONE HYDROCHLORIDE 5 MG: 5 TABLET ORAL at 21:25

## 2024-06-10 RX ADMIN — ACETAMINOPHEN 650 MG: 325 TABLET ORAL at 21:25

## 2024-06-10 RX ADMIN — MEPERIDINE HYDROCHLORIDE 25 MG: 25 INJECTION INTRAMUSCULAR; INTRAVENOUS; SUBCUTANEOUS at 15:40

## 2024-06-10 RX ADMIN — SUGAMMADEX 200 MG: 100 INJECTION, SOLUTION INTRAVENOUS at 15:42

## 2024-06-10 RX ADMIN — ROCURONIUM BROMIDE 30 MG: 10 INJECTION, SOLUTION INTRAVENOUS at 13:44

## 2024-06-10 RX ADMIN — HEPARIN SODIUM 5000 UNITS: 5000 INJECTION INTRAVENOUS; SUBCUTANEOUS at 12:19

## 2024-06-10 RX ADMIN — FENTANYL CITRATE 100 MCG: 50 INJECTION, SOLUTION INTRAMUSCULAR; INTRAVENOUS at 13:26

## 2024-06-10 RX ADMIN — ESMOLOL HYDROCHLORIDE 30 MG: 100 INJECTION, SOLUTION INTRAVENOUS at 14:12

## 2024-06-10 RX ADMIN — Medication 200 MCG: at 14:38

## 2024-06-10 RX ADMIN — ROCURONIUM BROMIDE 20 MG: 10 INJECTION, SOLUTION INTRAVENOUS at 14:29

## 2024-06-10 RX ADMIN — ONDANSETRON 4 MG: 2 INJECTION INTRAMUSCULAR; INTRAVENOUS at 15:38

## 2024-06-10 RX ADMIN — FENTANYL CITRATE 50 MCG: 50 INJECTION, SOLUTION INTRAMUSCULAR; INTRAVENOUS at 14:00

## 2024-06-10 RX ADMIN — SODIUM CHLORIDE, SODIUM LACTATE, POTASSIUM CHLORIDE, AND CALCIUM CHLORIDE: 600; 310; 30; 20 INJECTION, SOLUTION INTRAVENOUS at 14:50

## 2024-06-10 RX ADMIN — HYDROMORPHONE HYDROCHLORIDE 1 MG: 1 INJECTION, SOLUTION INTRAMUSCULAR; INTRAVENOUS; SUBCUTANEOUS at 15:56

## 2024-06-10 RX ADMIN — Medication 3 MG: at 21:28

## 2024-06-10 RX ADMIN — Medication 100 MCG: at 14:19

## 2024-06-10 RX ADMIN — SODIUM CHLORIDE, SODIUM LACTATE, POTASSIUM CHLORIDE, AND CALCIUM CHLORIDE 100 ML/HR: 600; 310; 30; 20 INJECTION, SOLUTION INTRAVENOUS at 12:20

## 2024-06-10 RX ADMIN — FENTANYL CITRATE 50 MCG: 50 INJECTION, SOLUTION INTRAMUSCULAR; INTRAVENOUS at 14:04

## 2024-06-10 RX ADMIN — ROCURONIUM BROMIDE 50 MG: 10 INJECTION, SOLUTION INTRAVENOUS at 13:27

## 2024-06-10 RX ADMIN — SODIUM CHLORIDE, POTASSIUM CHLORIDE, SODIUM LACTATE AND CALCIUM CHLORIDE 100 ML/HR: 600; 310; 30; 20 INJECTION, SOLUTION INTRAVENOUS at 17:40

## 2024-06-10 RX ADMIN — DEXAMETHASONE SODIUM PHOSPHATE 4 MG: 4 INJECTION, SOLUTION INTRAMUSCULAR; INTRAVENOUS at 13:46

## 2024-06-10 RX ADMIN — Medication 200 MCG: at 14:46

## 2024-06-10 RX ADMIN — CELECOXIB 400 MG: 100 CAPSULE ORAL at 12:19

## 2024-06-10 RX ADMIN — Medication 200 MCG: at 14:26

## 2024-06-10 RX ADMIN — FENTANYL CITRATE 50 MCG: 50 INJECTION, SOLUTION INTRAMUSCULAR; INTRAVENOUS at 13:33

## 2024-06-10 RX ADMIN — MIDAZOLAM 2 MG: 1 INJECTION INTRAMUSCULAR; INTRAVENOUS at 13:23

## 2024-06-10 RX ADMIN — GABAPENTIN 600 MG: 300 CAPSULE ORAL at 12:19

## 2024-06-10 RX ADMIN — CEFAZOLIN 2 G: 330 INJECTION, POWDER, FOR SOLUTION INTRAMUSCULAR; INTRAVENOUS at 13:35

## 2024-06-10 RX ADMIN — FENTANYL CITRATE 50 MCG: 50 INJECTION, SOLUTION INTRAMUSCULAR; INTRAVENOUS at 13:44

## 2024-06-10 RX ADMIN — PROPOFOL 200 MG: 10 INJECTION, EMULSION INTRAVENOUS at 13:26

## 2024-06-10 RX ADMIN — ACETAMINOPHEN 975 MG: 325 TABLET ORAL at 12:19

## 2024-06-10 RX ADMIN — SCOPOLAMINE 1 PATCH: 1.5 PATCH, EXTENDED RELEASE TRANSDERMAL at 12:20

## 2024-06-10 RX ADMIN — LIDOCAINE HYDROCHLORIDE 40 MG: 20 INJECTION INTRAVENOUS at 13:26

## 2024-06-10 SDOH — SOCIAL STABILITY: SOCIAL INSECURITY: ARE THERE ANY APPARENT SIGNS OF INJURIES/BEHAVIORS THAT COULD BE RELATED TO ABUSE/NEGLECT?: NO

## 2024-06-10 SDOH — SOCIAL STABILITY: SOCIAL INSECURITY: DO YOU FEEL ANYONE HAS EXPLOITED OR TAKEN ADVANTAGE OF YOU FINANCIALLY OR OF YOUR PERSONAL PROPERTY?: NO

## 2024-06-10 SDOH — SOCIAL STABILITY: SOCIAL INSECURITY: ARE YOU OR HAVE YOU BEEN THREATENED OR ABUSED PHYSICALLY, EMOTIONALLY, OR SEXUALLY BY ANYONE?: NO

## 2024-06-10 SDOH — SOCIAL STABILITY: SOCIAL INSECURITY: HAVE YOU HAD THOUGHTS OF HARMING ANYONE ELSE?: NO

## 2024-06-10 SDOH — SOCIAL STABILITY: SOCIAL INSECURITY: ABUSE: ADULT

## 2024-06-10 SDOH — SOCIAL STABILITY: SOCIAL INSECURITY: DOES ANYONE TRY TO KEEP YOU FROM HAVING/CONTACTING OTHER FRIENDS OR DOING THINGS OUTSIDE YOUR HOME?: NO

## 2024-06-10 SDOH — SOCIAL STABILITY: SOCIAL INSECURITY: WERE YOU ABLE TO COMPLETE ALL THE BEHAVIORAL HEALTH SCREENINGS?: NO

## 2024-06-10 SDOH — SOCIAL STABILITY: SOCIAL INSECURITY: HAVE YOU HAD ANY THOUGHTS OF HARMING ANYONE ELSE?: NO

## 2024-06-10 SDOH — SOCIAL STABILITY: SOCIAL INSECURITY: HAS ANYONE EVER THREATENED TO HURT YOUR FAMILY OR YOUR PETS?: NO

## 2024-06-10 SDOH — HEALTH STABILITY: MENTAL HEALTH: CURRENT SMOKER: 0

## 2024-06-10 SDOH — SOCIAL STABILITY: SOCIAL INSECURITY: DO YOU FEEL UNSAFE GOING BACK TO THE PLACE WHERE YOU ARE LIVING?: NO

## 2024-06-10 ASSESSMENT — COGNITIVE AND FUNCTIONAL STATUS - GENERAL
EATING MEALS: A LITTLE
TOILETING: A LITTLE
MOBILITY SCORE: 18
WALKING IN HOSPITAL ROOM: A LITTLE
MOVING FROM LYING ON BACK TO SITTING ON SIDE OF FLAT BED WITH BEDRAILS: A LITTLE
TURNING FROM BACK TO SIDE WHILE IN FLAT BAD: A LITTLE
PERSONAL GROOMING: A LITTLE
DRESSING REGULAR LOWER BODY CLOTHING: A LITTLE
PATIENT BASELINE BEDBOUND: NO
HELP NEEDED FOR BATHING: A LITTLE
MOVING TO AND FROM BED TO CHAIR: A LITTLE
STANDING UP FROM CHAIR USING ARMS: A LITTLE
DAILY ACTIVITIY SCORE: 18
CLIMB 3 TO 5 STEPS WITH RAILING: A LITTLE
DRESSING REGULAR UPPER BODY CLOTHING: A LITTLE

## 2024-06-10 ASSESSMENT — ACTIVITIES OF DAILY LIVING (ADL)
WALKS IN HOME: INDEPENDENT
ADEQUATE_TO_COMPLETE_ADL: YES
DRESSING YOURSELF: INDEPENDENT
PATIENT'S MEMORY ADEQUATE TO SAFELY COMPLETE DAILY ACTIVITIES?: YES
FEEDING YOURSELF: INDEPENDENT
GROOMING: INDEPENDENT
LACK_OF_TRANSPORTATION: NO
BATHING: INDEPENDENT
HEARING - LEFT EAR: FUNCTIONAL
JUDGMENT_ADEQUATE_SAFELY_COMPLETE_DAILY_ACTIVITIES: YES
HEARING - RIGHT EAR: FUNCTIONAL
TOILETING: INDEPENDENT

## 2024-06-10 ASSESSMENT — PAIN SCALES - GENERAL
PAINLEVEL_OUTOF10: 4
PAINLEVEL_OUTOF10: 3
PAINLEVEL_OUTOF10: 3
PAINLEVEL_OUTOF10: 1
PAINLEVEL_OUTOF10: 6
PAINLEVEL_OUTOF10: 6
PAINLEVEL_OUTOF10: 0 - NO PAIN
PAINLEVEL_OUTOF10: 3

## 2024-06-10 ASSESSMENT — LIFESTYLE VARIABLES
AUDIT-C TOTAL SCORE: 0
HOW OFTEN DO YOU HAVE A DRINK CONTAINING ALCOHOL: NEVER
AUDIT-C TOTAL SCORE: 0
SUBSTANCE_ABUSE_PAST_12_MONTHS: NO
HOW MANY STANDARD DRINKS CONTAINING ALCOHOL DO YOU HAVE ON A TYPICAL DAY: PATIENT DOES NOT DRINK
SKIP TO QUESTIONS 9-10: 1
HOW OFTEN DO YOU HAVE 6 OR MORE DRINKS ON ONE OCCASION: NEVER
PRESCIPTION_ABUSE_PAST_12_MONTHS: NO

## 2024-06-10 ASSESSMENT — PATIENT HEALTH QUESTIONNAIRE - PHQ9
2. FEELING DOWN, DEPRESSED OR HOPELESS: NOT AT ALL
SUM OF ALL RESPONSES TO PHQ9 QUESTIONS 1 & 2: 0
1. LITTLE INTEREST OR PLEASURE IN DOING THINGS: NOT AT ALL

## 2024-06-10 ASSESSMENT — COLUMBIA-SUICIDE SEVERITY RATING SCALE - C-SSRS
1. IN THE PAST MONTH, HAVE YOU WISHED YOU WERE DEAD OR WISHED YOU COULD GO TO SLEEP AND NOT WAKE UP?: NO
2. HAVE YOU ACTUALLY HAD ANY THOUGHTS OF KILLING YOURSELF?: NO
6. HAVE YOU EVER DONE ANYTHING, STARTED TO DO ANYTHING, OR PREPARED TO DO ANYTHING TO END YOUR LIFE?: NO

## 2024-06-10 ASSESSMENT — PAIN DESCRIPTION - LOCATION: LOCATION: ABDOMEN

## 2024-06-10 ASSESSMENT — PAIN DESCRIPTION - DESCRIPTORS
DESCRIPTORS: DISCOMFORT
DESCRIPTORS: DISCOMFORT

## 2024-06-10 ASSESSMENT — PAIN SCALES - PAIN ASSESSMENT IN ADVANCED DEMENTIA (PAINAD): TOTALSCORE: MEDICATION (SEE MAR)

## 2024-06-10 NOTE — ANESTHESIA POSTPROCEDURE EVALUATION
Patient: Daryl Win    Procedure Summary       Date: 06/10/24 Room / Location: [facility] OR 09 / [facility]    Anesthesia Start: 1315 Anesthesia Stop: 1559    Procedures:       ROBOT ASSISTED HIATAL HERNIA REPAIR      EGD Diagnosis:       Large hiatal hernia      Upper GI bleed      Acute Bulmaro ulcer      (Large hiatal hernia [K44.9])      (Upper GI bleed [K92.2])      (Acute Bulmaro ulcer [K25.3])    Surgeons: Ashok Negron MD PhD Responsible Provider: Jessika Pardo MD    Anesthesia Type: general ASA Status: 2            Anesthesia Type: general    Vitals Value Taken Time   /85 06/10/24 1554   Temp 36.9 06/10/24 1559   Pulse 120 06/10/24 1558   Resp 16 06/10/24 1559   SpO2 100 % 06/10/24 1558   Vitals shown include unfiled device data.    Anesthesia Post Evaluation    Patient location during evaluation: PACU  Patient participation: complete - patient participated  Level of consciousness: awake and alert and sleepy but conscious  Pain management: adequate  Airway patency: patent  Cardiovascular status: acceptable and hemodynamically stable  Respiratory status: acceptable and face mask  Hydration status: acceptable  Postoperative Nausea and Vomiting: none        There were no known notable events for this encounter.

## 2024-06-10 NOTE — ANESTHESIA PREPROCEDURE EVALUATION
Patient: Daryl Win    Procedure Information       Date/Time: 06/10/24 1255    Procedures:       ROBOT ASSISTED HIATAL HERNIA REPAIR      EGD    Location: [facility] 09 / [facility]    Surgeons: Ashok Negron MD PhD            Relevant Problems   Anesthesia (within normal limits)      GI   (+) Acute Bulmaro ulcer   (+) Large hiatal hernia   (+) Peptic ulcer with hemorrhage   (+) Upper GI bleed      Hematology   (+) Anemia   (+) Iron deficiency anemia due to chronic blood loss       Clinical information reviewed:   Tobacco  Allergies  Meds   Med Hx  Surg Hx   Fam Hx  Soc Hx        NPO Detail:  NPO/Void Status  Carbohydrate Drink Given Prior to Surgery? : N  Date of Last Liquid: 06/09/24  Time of Last Liquid: 2100  Date of Last Solid: 06/09/24  Time of Last Solid: 2000  Last Intake Type: Light meal         Physical Exam    Airway  Mallampati: II  TM distance: >3 FB  Neck ROM: full     Cardiovascular - normal exam     Dental - normal exam     Pulmonary - normal exam     Abdominal - normal exam             Anesthesia Plan    History of general anesthesia?: yes  History of complications of general anesthesia?: no    ASA 2     general     The patient is not a current smoker.    intravenous induction   Postoperative administration of opioids is intended.  Anesthetic plan and risks discussed with patient.  Use of blood products discussed with patient who consented to blood products.    Plan discussed with CAA.

## 2024-06-10 NOTE — CARE PLAN
Problem: Pain  Goal: My pain/discomfort is manageable  Outcome: Progressing     Problem: Safety  Goal: Patient will be injury free during hospitalization  Outcome: Progressing  Goal: I will remain free of falls  Outcome: Progressing     Problem: Daily Care  Goal: Daily care needs are met  Outcome: Progressing     Problem: Psychosocial Needs  Goal: Demonstrates ability to cope with hospitalization/illness  Outcome: Progressing  Goal: Collaborate with me, my family, and caregiver to identify my specific goals  Outcome: Progressing     Problem: Discharge Barriers  Goal: My discharge needs are met  Outcome: Progressing     Problem: Skin  Goal: Decreased wound size/increased tissue granulation at next dressing change  Outcome: Progressing  Goal: Participates in plan/prevention/treatment measures  Outcome: Progressing  Goal: Prevent/manage excess moisture  Outcome: Progressing  Goal: Prevent/minimize sheer/friction injuries  Outcome: Progressing  Goal: Promote/optimize nutrition  Outcome: Progressing  Goal: Promote skin healing  Outcome: Progressing     Problem: Pain  Goal: Takes deep breaths with improved pain control throughout the shift  Outcome: Progressing  Goal: Turns in bed with improved pain control throughout the shift  Outcome: Progressing  Goal: Walks with improved pain control throughout the shift  Outcome: Progressing  Goal: Performs ADL's with improved pain control throughout shift  Outcome: Progressing  Goal: Participates in PT with improved pain control throughout the shift  Outcome: Progressing  Goal: Free from opioid side effects throughout the shift  Outcome: Progressing  Goal: Free from acute confusion related to pain meds throughout the shift  Outcome: Progressing     Problem: Fall/Injury  Goal: Not fall by end of shift  Outcome: Progressing  Goal: Be free from injury by end of the shift  Outcome: Progressing  Goal: Verbalize understanding of personal risk factors for fall in the hospital  Outcome:  Progressing  Goal: Verbalize understanding of risk factor reduction measures to prevent injury from fall in the home  Outcome: Progressing  Goal: Use assistive devices by end of the shift  Outcome: Progressing  Goal: Pace activities to prevent fatigue by end of the shift  Outcome: Progressing

## 2024-06-10 NOTE — INTERVAL H&P NOTE
H&P reviewed. The patient was examined and there are no changes to the H&P. Patient is appropriate for OR.       Discussed with Attending Physician     Neymar Julien MD  General Surgery PGY 5

## 2024-06-10 NOTE — OP NOTE
ROBOT ASSISTED HIATAL HERNIA REPAIR Operative Note     Date: 6/10/2024  OR Location: [facility]    Name: Daryl Win, : 1990, Age: 34 y.o., MRN: [id number], Sex: male    Diagnosis  Pre-op Diagnosis     * Large hiatal hernia [K44.9]     * Upper GI bleed [K92.2]     * Acute Bulmaro ulcer [K25.3] Post-op Diagnosis     * Large hiatal hernia [K44.9]     * Upper GI bleed [K92.2]     * Acute Bulmaro ulcer [K25.3]     Procedures  ROBOT ASSISTED HIATAL HERNIA REPAIR  43157 - [place] LAPS RPR PARAESPHGL HRNA INCL FUNDPLSTY W/MESH    EGD  59024 - [place] ESOPHAGOGASTRODUODENOSCOPY TRANSORAL DIAGNOSTIC      Surgeons   Panel 1:     * Ashok Negron - Primary  Panel 2:     * Ashok Negron - Primary    Resident/Fellow/Other Assistant:  Surgeons and Role:  * No surgeons found with a matching role *    Procedure Summary  Anesthesia: General  ASA: II  Anesthesia Staff: Anesthesiologist: Jessika Pardo MD  CRNA: April Dobson APRN-CRNA  C-AA: CHARLOTTE Khan  Estimated Blood Loss: 50 mL  Intra-op Medications:   Administrations occurring from 1255 to 1555 on 06/10/24:   Medication Name Total Dose   BUPivacaine HCl (Marcaine) 0.5 % (5 mg/mL) injection 10 mL   lactated Ringer's infusion 108.33 mL              Anesthesia Record               Intraprocedure I/O Totals          Intake    lactated Ringer's infusion 1600.00 mL    Total Intake 1600 mL          Specimen:   ID Type Source Tests Collected by Time   1 : HERNIA SAC Tissue HERNIA SAC SURGICAL PATHOLOGY EXAM Ashok Negron MD PhD 6/10/2024 1436        Staff:   Circulator: Tatiana Jay Person: Luana Falcon Circulator: Wanda Falcon Scrub: Christine         Drains and/or Catheters: * None in log *    Tourniquet Times:         Implants:     Findings: Giant paraesophageal hernia with more than 50% of stomach in chest. Copious perigastric fatty tissue obscuring vagus nerves. Difficult dissection with thick scar tissue and large sac. 3 cm of intraabdominal esophagus  at end of case, Nissen fundoplication and gastropexy. EGD showed esophagitis and islands of gastric mucosa another 3 cm above the z-line.        Indications: Daryl Win is an 34 y.o. male who is having surgery for Large hiatal hernia [K44.9]  Upper GI bleed [K92.2]  Acute Bulmaro ulcer [K25.3].     The patient was seen in the preoperative area. The risks, benefits, complications, treatment options, non-operative alternatives, expected recovery and outcomes were discussed with the patient. The possibilities of reaction to medication, pulmonary aspiration, injury to surrounding structures, bleeding, recurrent infection, the need for additional procedures, failure to diagnose a condition, and creating a complication requiring transfusion or operation were discussed with the patient. The patient concurred with the proposed plan, giving informed consent.  The site of surgery was properly noted/marked if necessary per policy. The patient has been actively warmed in preoperative area. Preoperative antibiotics have been ordered and given within 1 hours of incision. Venous thrombosis prophylaxis have been ordered including bilateral sequential compression devices and chemical prophylaxis    Procedure Details: During the preoperative huddle the patient's identifiers, consent, operation details, and equipment was verified. The patient was taken to the operating room and then placed in the supine position with both arms abducted and pressure points were padded. Sequential compression stockings were placed and general endotracheal anesthesia was administered. The patient was then prepped and draped in the usual sterile fashion. Prophylactic antibiotics were administered. A surgical timeout was then performed confirming the correct patient, procedure, position, equipment, and any necessary operative implants.     Access to the abdomen was gained through a right midclavicular incision and utilized a 0° 5 mm laparoscope with  an optical 8 mm robotic trocar. The abdomen was insufflated to a pressure of 15 mmHg after peritoneal access was obtained and there was no injury to bowel or surrounding structures visualized. Additional 8 mm robotic trocars were placed in a line across the abdomen roughly 8 cm apart. The obturator was used to create a tract in the subxiphoid region and then a Getachew was placed to elevate the liver through this incision. All ports were placed under direct visualization without injuries.    50% of the stomach was trapped in the chest. The sac was very thick and sprung back immediately off tension. The vascular supply at the greater curve of the stomach was taken down with the harmonic scalpel.  This was done about half a centimeter to a centimeter away from the serosa of the stomach is to protect it.  Roughly 50% of the greater curve was taken down in this way.  This led directly into the left phrenoesophageal ligament which was also taken with the harmonic scalpel with some difficulty, as again it was very thick.  The pars flaccida was opened with the harmonic scalpel and this revealed the right bertrand.  Using gentle blunt dissection the bertrand was  from the esophagus and hernia sac.  This space was worked circumferentially around the anterior aspect of the esophagus  the large sac from the bertrand down to where the left phrenoesophageal ligament was taken.  Care was taken not to injure the area around the anterior vagus in the process, which was seen deep in the mediastinum but not near the sac due to redundant tissue and perigastric fat.  A window was dissected behind the esophagus and 1/4 inch Penrose was passed behind it to elevate and retract down the esophagus.  Additional blunt dissection was employed to clear the entire mediastinum and deliver roughly 4 cm of esophageal length below the diaphragm. A small right pleural tear of no consequence was formed inherent to the dissection.    The  posterior crura was then repaired using a running and locking nonabsorbable barbed stitch taking 8 bites roughly 1 cm apart before doubling back and locking the stitch. Under tension the esophagus filled the closed hiatus, with minimal dimpling when taken off tension. An EGD was performed that demonstrated the z-line was below the diaphragmatic pinch, with islands of gastric mucosa above this, that the esophagus burped open under pressure appropriately, and that the scope passed through the hiatus easily. The diaphragmatic repair appeared to be sized correctly.    The sac was excised completely. Again the anterior vagus could not be seen but the expected area was avoided.     A complete posterior fundoplication was then completed. The fundus was brought under the esophagus in a shoe-shine maneuver to identify the stomach to be used in the wrap, and the right side was then anchored to the right posterior bertrand using 0 silk twice. Three additional silk sutures were placed gastro-esophageal (mostly sac)-gastric on both sides of the esophagus to complete the loose wrap while avoiding the anterior vagus, which was identified.  Roughly 1 cm of exposed esophagus was below the diaphragm between the wrap and the diaphragm.    An EGD was again performed which demonstrated that the Z-line of the esophagus was 3 centimeters below the pinch of the diaphragm, and the fundoplication was intact and easy to pass through. The duodenum was examined. Retroflexion of the scope additionally demonstrated that the wrap was intact.    A gastropexy was performed at the midpoint of the stomach with 0 silk x 2 to the left abdominal wall. An adjacent serosal tear was closed with 2-0 vicyl. The field was hemostatic.    The Getachew was removed under direct visualization and the field was completely hemostatic.  Each additional port was then removed under direct visualization. Each of the skin incisions were closed with 4-0 Monocryl suture.  All  counts were correct.  Skin glue was applied.  0.5% Marcaine was injected into the port sites for anesthesia.  The patient was reversed and extubated.  Patient was transported to the PACU in stable condition.  I was present scrubbed for the entire procedure.        Complications:  None; patient tolerated the procedure well.    Disposition: PACU - hemodynamically stable.  Condition: stable         Additional Details: None    Attending Attestation: I was present and scrubbed for the entire procedure.    Ashok Negron  Phone Number: [phone]

## 2024-06-10 NOTE — ANESTHESIA PROCEDURE NOTES
Airway  Date/Time: 6/10/2024 1:27 PM  Urgency: elective    Airway not difficult    Staffing  Performed: CHARLOTTE   Authorized by: Jessika Pardo MD    Performed by: CHARLOTTE Khan  Patient location during procedure: OR    Indications and Patient Condition  Indications for airway management: anesthesia  Spontaneous Ventilation: absent  Sedation level: deep  Preoxygenated: yes  Mask difficulty assessment: 1 - vent by mask  Planned trial extubation    Final Airway Details  Final airway type: endotracheal airway      Successful airway: ETT  Cuffed: yes   Successful intubation technique: video laryngoscopy  Facilitating devices/methods: intubating stylet  Endotracheal tube insertion site: oral  Blade: Johnnie ([employer])  Blade size: #3  ETT size (mm): 7.5  Cormack-Lehane Classification: grade I - full view of glottis  Placement verified by: chest auscultation and capnometry   Measured from: lips  ETT to lips (cm): 23  Number of attempts at approach: 1  Number of other approaches attempted: 0

## 2024-06-11 ENCOUNTER — APPOINTMENT (OUTPATIENT)
Dept: RADIOLOGY | Facility: HOSPITAL | Age: 34
End: 2024-06-11
Payer: COMMERCIAL

## 2024-06-11 ENCOUNTER — PHARMACY VISIT (OUTPATIENT)
Dept: PHARMACY | Facility: CLINIC | Age: 34
End: 2024-06-11
Payer: COMMERCIAL

## 2024-06-11 VITALS
DIASTOLIC BLOOD PRESSURE: 79 MMHG | HEART RATE: 86 BPM | OXYGEN SATURATION: 97 % | HEIGHT: 70 IN | SYSTOLIC BLOOD PRESSURE: 131 MMHG | BODY MASS INDEX: 27.9 KG/M2 | WEIGHT: 194.89 LBS | TEMPERATURE: 97.3 F | RESPIRATION RATE: 16 BRPM

## 2024-06-11 PROBLEM — K44.9 HIATAL HERNIA: Status: RESOLVED | Noted: 2024-06-10 | Resolved: 2024-06-11

## 2024-06-11 PROBLEM — K25.3 ACUTE CAMERON ULCER: Status: RESOLVED | Noted: 2024-05-23 | Resolved: 2024-06-11

## 2024-06-11 PROBLEM — K44.9 LARGE HIATAL HERNIA: Status: RESOLVED | Noted: 2024-05-20 | Resolved: 2024-06-11

## 2024-06-11 PROBLEM — K92.2 UPPER GI BLEED: Status: RESOLVED | Noted: 2023-11-25 | Resolved: 2024-06-11

## 2024-06-11 LAB
ALBUMIN SERPL BCP-MCNC: 3.9 G/DL (ref 3.4–5)
ALBUMIN SERPL BCP-MCNC: 3.9 G/DL (ref 3.4–5)
ALP SERPL-CCNC: 47 U/L (ref 33–120)
ALT SERPL W P-5'-P-CCNC: 749 U/L (ref 10–52)
ANION GAP SERPL CALC-SCNC: 10 MMOL/L (ref 10–20)
ANION GAP SERPL CALC-SCNC: 10 MMOL/L (ref 10–20)
AST SERPL W P-5'-P-CCNC: 736 U/L (ref 9–39)
BILIRUB SERPL-MCNC: 0.6 MG/DL (ref 0–1.2)
BUN SERPL-MCNC: 9 MG/DL (ref 6–23)
BUN SERPL-MCNC: 9 MG/DL (ref 6–23)
CALCIUM SERPL-MCNC: 9 MG/DL (ref 8.6–10.3)
CALCIUM SERPL-MCNC: 9 MG/DL (ref 8.6–10.3)
CHLORIDE SERPL-SCNC: 107 MMOL/L (ref 98–107)
CHLORIDE SERPL-SCNC: 107 MMOL/L (ref 98–107)
CO2 SERPL-SCNC: 25 MMOL/L (ref 21–32)
CO2 SERPL-SCNC: 25 MMOL/L (ref 21–32)
CREAT SERPL-MCNC: 0.78 MG/DL (ref 0.5–1.3)
CREAT SERPL-MCNC: 0.79 MG/DL (ref 0.5–1.3)
EGFRCR SERPLBLD CKD-EPI 2021: >90 ML/MIN/1.73M*2
EGFRCR SERPLBLD CKD-EPI 2021: >90 ML/MIN/1.73M*2
ERYTHROCYTE [DISTWIDTH] IN BLOOD BY AUTOMATED COUNT: 18.6 % (ref 11.5–14.5)
ERYTHROCYTE [DISTWIDTH] IN BLOOD BY AUTOMATED COUNT: 18.8 % (ref 11.5–14.5)
GLUCOSE SERPL-MCNC: 93 MG/DL (ref 74–99)
GLUCOSE SERPL-MCNC: 95 MG/DL (ref 74–99)
HCT VFR BLD AUTO: 29.2 % (ref 41–52)
HCT VFR BLD AUTO: 30 % (ref 41–52)
HGB BLD-MCNC: 8.6 G/DL (ref 13.5–17.5)
HGB BLD-MCNC: 8.9 G/DL (ref 13.5–17.5)
MAGNESIUM SERPL-MCNC: 2.02 MG/DL (ref 1.6–2.4)
MCH RBC QN AUTO: 22.7 PG (ref 26–34)
MCH RBC QN AUTO: 22.8 PG (ref 26–34)
MCHC RBC AUTO-ENTMCNC: 29.5 G/DL (ref 32–36)
MCHC RBC AUTO-ENTMCNC: 29.7 G/DL (ref 32–36)
MCV RBC AUTO: 77 FL (ref 80–100)
MCV RBC AUTO: 77 FL (ref 80–100)
NRBC BLD-RTO: 0 /100 WBCS (ref 0–0)
NRBC BLD-RTO: 0 /100 WBCS (ref 0–0)
PHOSPHATE SERPL-MCNC: 4.1 MG/DL (ref 2.5–4.9)
PLATELET # BLD AUTO: 386 X10*3/UL (ref 150–450)
PLATELET # BLD AUTO: 403 X10*3/UL (ref 150–450)
POTASSIUM SERPL-SCNC: 4 MMOL/L (ref 3.5–5.3)
POTASSIUM SERPL-SCNC: 4.1 MMOL/L (ref 3.5–5.3)
PROT SERPL-MCNC: 5.8 G/DL (ref 6.4–8.2)
RBC # BLD AUTO: 3.78 X10*6/UL (ref 4.5–5.9)
RBC # BLD AUTO: 3.92 X10*6/UL (ref 4.5–5.9)
SODIUM SERPL-SCNC: 138 MMOL/L (ref 136–145)
SODIUM SERPL-SCNC: 138 MMOL/L (ref 136–145)
WBC # BLD AUTO: 7.7 X10*3/UL (ref 4.4–11.3)
WBC # BLD AUTO: 7.9 X10*3/UL (ref 4.4–11.3)

## 2024-06-11 PROCEDURE — 36415 COLL VENOUS BLD VENIPUNCTURE: CPT | Performed by: STUDENT IN AN ORGANIZED HEALTH CARE EDUCATION/TRAINING PROGRAM

## 2024-06-11 PROCEDURE — 74240 X-RAY XM UPR GI TRC 1CNTRST: CPT

## 2024-06-11 PROCEDURE — 83735 ASSAY OF MAGNESIUM: CPT | Performed by: STUDENT IN AN ORGANIZED HEALTH CARE EDUCATION/TRAINING PROGRAM

## 2024-06-11 PROCEDURE — 96372 THER/PROPH/DIAG INJ SC/IM: CPT | Performed by: STUDENT IN AN ORGANIZED HEALTH CARE EDUCATION/TRAINING PROGRAM

## 2024-06-11 PROCEDURE — 80069 RENAL FUNCTION PANEL: CPT | Performed by: STUDENT IN AN ORGANIZED HEALTH CARE EDUCATION/TRAINING PROGRAM

## 2024-06-11 PROCEDURE — RXMED WILLOW AMBULATORY MEDICATION CHARGE

## 2024-06-11 PROCEDURE — 7100000011 HC EXTENDED STAY RECOVERY HOURLY - NURSING UNIT

## 2024-06-11 PROCEDURE — 2550000001 HC RX 255 CONTRASTS: Performed by: SURGERY

## 2024-06-11 PROCEDURE — 85027 COMPLETE CBC AUTOMATED: CPT | Performed by: REGISTERED NURSE

## 2024-06-11 PROCEDURE — 84100 ASSAY OF PHOSPHORUS: CPT | Performed by: STUDENT IN AN ORGANIZED HEALTH CARE EDUCATION/TRAINING PROGRAM

## 2024-06-11 PROCEDURE — 74240 X-RAY XM UPR GI TRC 1CNTRST: CPT | Performed by: STUDENT IN AN ORGANIZED HEALTH CARE EDUCATION/TRAINING PROGRAM

## 2024-06-11 PROCEDURE — 2500000004 HC RX 250 GENERAL PHARMACY W/ HCPCS (ALT 636 FOR OP/ED): Performed by: STUDENT IN AN ORGANIZED HEALTH CARE EDUCATION/TRAINING PROGRAM

## 2024-06-11 PROCEDURE — 2500000001 HC RX 250 WO HCPCS SELF ADMINISTERED DRUGS (ALT 637 FOR MEDICARE OP): Performed by: STUDENT IN AN ORGANIZED HEALTH CARE EDUCATION/TRAINING PROGRAM

## 2024-06-11 PROCEDURE — 2500000004 HC RX 250 GENERAL PHARMACY W/ HCPCS (ALT 636 FOR OP/ED): Performed by: SURGERY

## 2024-06-11 PROCEDURE — 85027 COMPLETE CBC AUTOMATED: CPT | Performed by: STUDENT IN AN ORGANIZED HEALTH CARE EDUCATION/TRAINING PROGRAM

## 2024-06-11 PROCEDURE — 36415 COLL VENOUS BLD VENIPUNCTURE: CPT | Performed by: REGISTERED NURSE

## 2024-06-11 RX ORDER — OXYCODONE HYDROCHLORIDE 5 MG/1
5 TABLET ORAL 3 TIMES DAILY PRN
Qty: 6 TABLET | Refills: 0 | Status: SHIPPED | OUTPATIENT
Start: 2024-06-11

## 2024-06-11 RX ORDER — METOCLOPRAMIDE 10 MG/1
10 TABLET ORAL
Qty: 20 TABLET | Refills: 0 | Status: SHIPPED | OUTPATIENT
Start: 2024-06-11 | End: 2024-06-16

## 2024-06-11 RX ORDER — DEXAMETHASONE 4 MG/1
4 TABLET ORAL EVERY 12 HOURS SCHEDULED
Status: DISCONTINUED | OUTPATIENT
Start: 2024-06-11 | End: 2024-06-11 | Stop reason: HOSPADM

## 2024-06-11 RX ORDER — SIMETHICONE 80 MG
40 TABLET,CHEWABLE ORAL DAILY
Qty: 2 TABLET | Refills: 0 | Status: SHIPPED | OUTPATIENT
Start: 2024-06-12 | End: 2024-06-16

## 2024-06-11 RX ORDER — ONDANSETRON 4 MG/1
4 TABLET, ORALLY DISINTEGRATING ORAL EVERY 8 HOURS PRN
Qty: 20 TABLET | Refills: 0 | Status: SHIPPED | OUTPATIENT
Start: 2024-06-11

## 2024-06-11 RX ORDER — DEXAMETHASONE 4 MG/1
4 TABLET ORAL EVERY 12 HOURS SCHEDULED
Qty: 10 TABLET | Refills: 0 | Status: SHIPPED | OUTPATIENT
Start: 2024-06-11 | End: 2024-06-16

## 2024-06-11 RX ORDER — PANTOPRAZOLE SODIUM 40 MG/1
40 TABLET, DELAYED RELEASE ORAL
Qty: 60 TABLET | Refills: 0 | Status: SHIPPED | OUTPATIENT
Start: 2024-06-11

## 2024-06-11 RX ADMIN — DIATRIZOATE MEGLUMINE AND DIATRIZOATE SODIUM 20 ML: 660; 100 LIQUID ORAL; RECTAL at 10:00

## 2024-06-11 RX ADMIN — SIMETHICONE 40 MG: 80 TABLET, CHEWABLE ORAL at 08:13

## 2024-06-11 RX ADMIN — ONDANSETRON 4 MG: 2 INJECTION INTRAMUSCULAR; INTRAVENOUS at 12:13

## 2024-06-11 RX ADMIN — PANTOPRAZOLE SODIUM 40 MG: 40 TABLET, DELAYED RELEASE ORAL at 06:30

## 2024-06-11 RX ADMIN — METOCLOPRAMIDE HYDROCHLORIDE 10 MG: 5 INJECTION INTRAMUSCULAR; INTRAVENOUS at 06:30

## 2024-06-11 RX ADMIN — METOCLOPRAMIDE HYDROCHLORIDE 10 MG: 5 INJECTION INTRAMUSCULAR; INTRAVENOUS at 00:09

## 2024-06-11 RX ADMIN — METOCLOPRAMIDE HYDROCHLORIDE 10 MG: 5 INJECTION INTRAMUSCULAR; INTRAVENOUS at 11:04

## 2024-06-11 RX ADMIN — ONDANSETRON 4 MG: 2 INJECTION INTRAMUSCULAR; INTRAVENOUS at 06:30

## 2024-06-11 RX ADMIN — OXYCODONE HYDROCHLORIDE 5 MG: 5 TABLET ORAL at 06:33

## 2024-06-11 RX ADMIN — DEXAMETHASONE 4 MG: 4 TABLET ORAL at 11:04

## 2024-06-11 RX ADMIN — ONDANSETRON 4 MG: 2 INJECTION INTRAMUSCULAR; INTRAVENOUS at 00:02

## 2024-06-11 RX ADMIN — ACETAMINOPHEN 650 MG: 325 TABLET ORAL at 11:04

## 2024-06-11 RX ADMIN — ENOXAPARIN SODIUM 40 MG: 40 INJECTION SUBCUTANEOUS at 08:13

## 2024-06-11 RX ADMIN — ACETAMINOPHEN 650 MG: 325 TABLET ORAL at 02:27

## 2024-06-11 RX ADMIN — ACETAMINOPHEN 650 MG: 325 TABLET ORAL at 06:30

## 2024-06-11 ASSESSMENT — PAIN SCALES - GENERAL
PAINLEVEL_OUTOF10: 4
PAINLEVEL_OUTOF10: 3

## 2024-06-11 ASSESSMENT — PAIN DESCRIPTION - LOCATION: LOCATION: ABDOMEN

## 2024-06-11 ASSESSMENT — PAIN SCALES - PAIN ASSESSMENT IN ADVANCED DEMENTIA (PAINAD): TOTALSCORE: MEDICATION (SEE MAR)

## 2024-06-11 ASSESSMENT — PAIN DESCRIPTION - DESCRIPTORS
DESCRIPTORS: DISCOMFORT
DESCRIPTORS: DISCOMFORT

## 2024-06-11 ASSESSMENT — PAIN - FUNCTIONAL ASSESSMENT
PAIN_FUNCTIONAL_ASSESSMENT: 0-10
PAIN_FUNCTIONAL_ASSESSMENT: 0-10

## 2024-06-11 ASSESSMENT — PAIN DESCRIPTION - ORIENTATION: ORIENTATION: OTHER (COMMENT)

## 2024-06-11 ASSESSMENT — ACTIVITIES OF DAILY LIVING (ADL): LACK_OF_TRANSPORTATION: NO

## 2024-06-11 NOTE — NURSING NOTE
Lovenox scheduled to start today this am. Dr. Negron was notified as to start time.He said to start it this am.

## 2024-06-11 NOTE — CARE PLAN
Problem: Pain  Goal: My pain/discomfort is manageable  6/11/2024 0721 by Nicole Rivera RN  Outcome: Progressing  6/10/2024 1755 by Nicole Rivera RN  Outcome: Progressing     Problem: Safety  Goal: Patient will be injury free during hospitalization  6/11/2024 0721 by Nicole Rivera RN  Outcome: Progressing  6/10/2024 1755 by Nicole Rivera RN  Outcome: Progressing  Goal: I will remain free of falls  6/11/2024 0721 by Nicole Rivera RN  Outcome: Progressing  6/10/2024 1755 by Nicole Rivera RN  Outcome: Progressing     Problem: Daily Care  Goal: Daily care needs are met  6/11/2024 0721 by Nicole Rivera RN  Outcome: Progressing  6/10/2024 1755 by Nicole Rivera RN  Outcome: Progressing     Problem: Psychosocial Needs  Goal: Demonstrates ability to cope with hospitalization/illness  6/11/2024 0721 by Nicole Rivera RN  Outcome: Progressing  6/10/2024 1755 by Nicole Rivera RN  Outcome: Progressing  Goal: Collaborate with me, my family, and caregiver to identify my specific goals  6/11/2024 0721 by Nicole Rivera RN  Outcome: Progressing  6/10/2024 1755 by Nicole Rivera RN  Outcome: Progressing     Problem: Discharge Barriers  Goal: My discharge needs are met  6/11/2024 0721 by Nicole Rivera RN  Outcome: Progressing  6/10/2024 1755 by Nicole Rivera RN  Outcome: Progressing     Problem: Skin  Goal: Decreased wound size/increased tissue granulation at next dressing change  6/11/2024 0721 by Nicole Rivera RN  Outcome: Progressing  6/10/2024 1755 by Nicole Rivera RN  Outcome: Progressing  Goal: Participates in plan/prevention/treatment measures  6/11/2024 0721 by Nicole Rivera RN  Outcome: Progressing  6/10/2024 1755 by Nicole Rivera RN  Outcome: Progressing  Goal: Prevent/manage excess moisture  6/11/2024 0721 by Nicole Rivera RN  Outcome: Progressing  6/10/2024 1755 by Nicole Rivera RN  Outcome: Progressing  Goal: Prevent/minimize sheer/friction injuries  6/11/2024 0721 by Nicole Rivera RN  Outcome:  Progressing  6/10/2024 1755 by Nicole Rivera RN  Outcome: Progressing  Goal: Promote/optimize nutrition  6/11/2024 0721 by Nicole Rivera RN  Outcome: Progressing  6/10/2024 1755 by Nicole Rivera RN  Outcome: Progressing  Goal: Promote skin healing  6/11/2024 0721 by Nicole Rivera RN  Outcome: Progressing  6/10/2024 1755 by Nicole Rivera RN  Outcome: Progressing     Problem: Pain  Goal: Takes deep breaths with improved pain control throughout the shift  6/11/2024 0721 by Nicole Rivera RN  Outcome: Progressing  6/10/2024 1755 by Nicole Rivera RN  Outcome: Progressing  Goal: Turns in bed with improved pain control throughout the shift  6/11/2024 0721 by Nicole Rivera RN  Outcome: Progressing  6/10/2024 1755 by Nicole Rivera RN  Outcome: Progressing  Goal: Walks with improved pain control throughout the shift  6/11/2024 0721 by Nicole Rivera RN  Outcome: Progressing  6/10/2024 1755 by Nicole Rivera RN  Outcome: Progressing  Goal: Performs ADL's with improved pain control throughout shift  6/11/2024 0721 by Nicole Rivera RN  Outcome: Progressing  6/10/2024 1755 by Nicole Rivera RN  Outcome: Progressing  Goal: Participates in PT with improved pain control throughout the shift  6/11/2024 0721 by Nicole Rivera RN  Outcome: Progressing  6/10/2024 1755 by Nicole Rivera RN  Outcome: Progressing  Goal: Free from opioid side effects throughout the shift  6/11/2024 0721 by Nicole Rivera RN  Outcome: Progressing  6/10/2024 1755 by Nicole Rivera RN  Outcome: Progressing  Goal: Free from acute confusion related to pain meds throughout the shift  6/11/2024 0721 by Nicole Rivera RN  Outcome: Progressing  6/10/2024 1755 by Nicole Rivera RN  Outcome: Progressing     Problem: Fall/Injury  Goal: Not fall by end of shift  6/11/2024 0721 by Nicole Rivera RN  Outcome: Progressing  6/10/2024 1755 by Nicole Rivera RN  Outcome: Progressing  Goal: Be free from injury by end of the shift  6/11/2024 0721 by Nicole Rivera  RN  Outcome: Progressing  6/10/2024 1755 by Nicole Rivera RN  Outcome: Progressing  Goal: Verbalize understanding of personal risk factors for fall in the hospital  6/11/2024 0721 by Nicole Rivera RN  Outcome: Progressing  6/10/2024 1755 by Nicole Rivera RN  Outcome: Progressing  Goal: Verbalize understanding of risk factor reduction measures to prevent injury from fall in the home  6/11/2024 0721 by Nicole Rivera RN  Outcome: Progressing  6/10/2024 1755 by Nicole Rivera RN  Outcome: Progressing  Goal: Use assistive devices by end of the shift  6/11/2024 0721 by Nicole Rivera RN  Outcome: Progressing  6/10/2024 1755 by Nicole Rivera RN  Outcome: Progressing  Goal: Pace activities to prevent fatigue by end of the shift  6/11/2024 0721 by Nicole Rivera RN  Outcome: Progressing  6/10/2024 1755 by Nicole Rivera RN  Outcome: Progressing     Problem: Pain - Adult  Goal: Verbalizes/displays adequate comfort level or baseline comfort level  Outcome: Progressing     Problem: Safety - Adult  Goal: Free from fall injury  Outcome: Progressing     Problem: Discharge Planning  Goal: Discharge to home or other facility with appropriate resources  Outcome: Progressing     Problem: Chronic Conditions and Co-morbidities  Goal: Patient's chronic conditions and co-morbidity symptoms are monitored and maintained or improved  Outcome: Progressing

## 2024-06-11 NOTE — DISCHARGE INSTRUCTIONS
1. Take acetaminophen (Tylenol) or ibuprofen (Advil) as needed. Alternate them for pain relief. Ice packs on your incisions will help as well.    2. Take your usually prescribed medications unless otherwise directed.    3. If constipation occurs, increase fluid intake and add a stool softener (such as Colace).    4. Take short walks 2-3 times a day to reduce the risks of blood clots after surgery. Practice 10 deep breaths every hour for at least 12 hours a day for the first week after surgery. This will decrease your risk of lung problems or pneumonia. Do not lift heavy objects (more than 10 pounds) until the followup visit. Do no strenuous exercise or activity for 2 weeks. Driving is okay so long as no prescription pain medications are being taken, and there is no distraction from operating the vehicle.    5. After your esophageal surgery, expect some sticking with swallowing over the next 1-2 months. This is due to swelling around your esophagus at the wrap & hiatal diaphragm repair. To help you through this temporary phase, you should be on a pureed diet (mashed potato consistency only) until seen in clinic for followup.    Repair of your paraesophageal (or hiatal) hernia requires some diet restrictions after surgery. You will need to stay on a puree diet for approximately two weeks after surgery. During that time, you can try or experiment with eating soft, mushy foods like tuna, mashed potatoes, eggs, cottage cheese, and thick soups. The reason for puree diets is that there will be some swelling where your hernia was repaired. You may also notice that swallowing feels a little tight. This will improves as the swelling goes down. With time, you will be able to digest foods normally. Also, do not drink carbonated beverages for three weeks following surgery. We will discuss advancing your diet to solid food at your followup visit in clinic.

## 2024-06-11 NOTE — PROGRESS NOTES
Patient seen and examined. Chart reviewed where relevant. Discussed findings and clinical plan with note author. Modifications or addendum made only as necessary, and agree with finalized documentation.    POD1 from difficult HHR of giant paraesophageal hernia, with Nissen and gastropexy.  No reflux. Tolerating liquids. No flatus.  UGI shows sluggish emptying. Purposefully tight given age.  Known anemia and camerons ulcers. HGB dropped. Will monitor with repeat prior to DC.  Trial of FLD.  If HGB is stable, vitals are stable, and tolerates FLD will be discharged today.  Otherwise continue protocol on admission with dexamethasone added.    Ashok Negron MD, PhD  Available via [employer]

## 2024-06-11 NOTE — PROGRESS NOTES
06/11/24 0955   Discharge Planning   Living Arrangements Parent   Support Systems Parent   Assistance Needed independnt in all- denies falls, no use of DME. Works, drives- no need for work excuse.   Type of Residence Private residence   Number of Stairs to Enter Residence 2   Number of Stairs Within Residence 13   Do you have animals or pets at home? No   Who is requesting discharge planning? Provider   Home or Post Acute Services None   Patient expects to be discharged to: Home with mom   Does the patient need discharge transport arranged? No   Financial Resource Strain   How hard is it for you to pay for the very basics like food, housing, medical care, and heating? Not hard   Housing Stability   In the last 12 months, was there a time when you were not able to pay the mortgage or rent on time? N   In the last 12 months, how many places have you lived? 1   In the last 12 months, was there a time when you did not have a steady place to sleep or slept in a shelter (including now)? N   Transportation Needs   In the past 12 months, has lack of transportation kept you from medical appointments or from getting medications? no   In the past 12 months, has lack of transportation kept you from meetings, work, or from getting things needed for daily living? No     REMOTE COVERAGE- Called into pt room, role of tCC explaiend. Demographics confirmed. PCP- Dr. Khan- visit about 2 months ago. Pharmacy- [facility] in [place] on [address]- takes meds as prescribed able to afford and obtain. [facility]- no falls, no DME, works drives. No need for work excuse as surgery was planned. Lives with parent and planning to return. Home no needs. CT will follow.

## 2024-06-11 NOTE — CARE PLAN
Problem: Pain  Goal: My pain/discomfort is manageable  6/11/2024 1543 by Nicole Rivera RN  Outcome: Met  6/11/2024 0721 by Nicole Rivera RN  Outcome: Progressing     Problem: Safety  Goal: Patient will be injury free during hospitalization  6/11/2024 1543 by Nicole Rivera RN  Outcome: Met  6/11/2024 0721 by Nicole Rivera RN  Outcome: Progressing  Goal: I will remain free of falls  6/11/2024 1543 by Nicole Rivera RN  Outcome: Met  6/11/2024 0721 by Nicole Rivera RN  Outcome: Progressing     Problem: Daily Care  Goal: Daily care needs are met  6/11/2024 1543 by Nicole Rivera RN  Outcome: Met  6/11/2024 0721 by Nicole Rivera RN  Outcome: Progressing     Problem: Psychosocial Needs  Goal: Demonstrates ability to cope with hospitalization/illness  6/11/2024 1543 by Nicole Rivera RN  Outcome: Met  6/11/2024 0721 by Nicole Rivera RN  Outcome: Progressing  Goal: Collaborate with me, my family, and caregiver to identify my specific goals  6/11/2024 1543 by Nicole Rivera RN  Outcome: Met  6/11/2024 0721 by Nicole Rivera RN  Outcome: Progressing     Problem: Discharge Barriers  Goal: My discharge needs are met  6/11/2024 1543 by Nicole Rivera RN  Outcome: Met  6/11/2024 0721 by Nicole Rivera RN  Outcome: Progressing     Problem: Skin  Goal: Decreased wound size/increased tissue granulation at next dressing change  6/11/2024 1543 by Nicole Rivera RN  Outcome: Met  6/11/2024 0721 by Nicole Rivera RN  Outcome: Progressing  Goal: Participates in plan/prevention/treatment measures  6/11/2024 1543 by Nicole Rivera RN  Outcome: Met  6/11/2024 0721 by Nicole Rivera RN  Outcome: Progressing  Goal: Prevent/manage excess moisture  6/11/2024 1543 by Nicole Rivera RN  Outcome: Met  6/11/2024 0721 by Nicole Rivera RN  Outcome: Progressing  Goal: Prevent/minimize sheer/friction injuries  6/11/2024 1543 by Nicole Rivera RN  Outcome: Met  6/11/2024 0721 by Nicole Rivera RN  Outcome: Progressing  Goal: Promote/optimize  nutrition  6/11/2024 1543 by Nicole Rivera RN  Outcome: Met  6/11/2024 0721 by Nicole Rivera RN  Outcome: Progressing  Goal: Promote skin healing  6/11/2024 1543 by Nicole Rivera RN  Outcome: Met  6/11/2024 0721 by Nicole Rivera RN  Outcome: Progressing     Problem: Pain  Goal: Takes deep breaths with improved pain control throughout the shift  6/11/2024 1543 by Nicole Rivera RN  Outcome: Met  6/11/2024 0721 by Nicole Rivera RN  Outcome: Progressing  Goal: Turns in bed with improved pain control throughout the shift  6/11/2024 1543 by Nicole Rivera RN  Outcome: Met  6/11/2024 0721 by Nicole Rivera RN  Outcome: Progressing  Goal: Walks with improved pain control throughout the shift  6/11/2024 1543 by Nicole Rivera RN  Outcome: Met  6/11/2024 0721 by Nicole Rivera RN  Outcome: Progressing  Goal: Performs ADL's with improved pain control throughout shift  6/11/2024 1543 by Nicole Rivera RN  Outcome: Met  6/11/2024 0721 by Nicole Rivera RN  Outcome: Progressing  Goal: Participates in PT with improved pain control throughout the shift  6/11/2024 1543 by Nicole Rivera RN  Outcome: Met  6/11/2024 0721 by Nicole Rivera RN  Outcome: Progressing  Goal: Free from opioid side effects throughout the shift  6/11/2024 1543 by Nicole Rivera RN  Outcome: Met  6/11/2024 0721 by Nicole Rivera RN  Outcome: Progressing  Goal: Free from acute confusion related to pain meds throughout the shift  6/11/2024 1543 by Nicole Rivera RN  Outcome: Met  6/11/2024 0721 by Nicole Rivera RN  Outcome: Progressing     Problem: Fall/Injury  Goal: Not fall by end of shift  6/11/2024 1543 by Nicole Rivera RN  Outcome: Met  6/11/2024 0721 by Nicole Rivera RN  Outcome: Progressing  Goal: Be free from injury by end of the shift  6/11/2024 1543 by Nicole Rivera RN  Outcome: Met  6/11/2024 0721 by Nicole Rivera RN  Outcome: Progressing  Goal: Verbalize understanding of personal risk factors for fall in the hospital  6/11/2024 1543 by  Nicole Rivera RN  Outcome: Met  6/11/2024 0721 by Nicole Rivera RN  Outcome: Progressing  Goal: Verbalize understanding of risk factor reduction measures to prevent injury from fall in the home  6/11/2024 1543 by Nicole Rivera RN  Outcome: Met  6/11/2024 0721 by Nicole Rivera RN  Outcome: Progressing  Goal: Use assistive devices by end of the shift  6/11/2024 1543 by Nicole Rivera RN  Outcome: Met  6/11/2024 0721 by Nicole Rivera RN  Outcome: Progressing  Goal: Pace activities to prevent fatigue by end of the shift  6/11/2024 1543 by Nicole Rivera RN  Outcome: Met  6/11/2024 0721 by Nicole Rivera RN  Outcome: Progressing     Problem: Pain - Adult  Goal: Verbalizes/displays adequate comfort level or baseline comfort level  6/11/2024 1543 by Nicole Rivera RN  Outcome: Met  6/11/2024 0721 by Nicole Rivera RN  Outcome: Progressing     Problem: Safety - Adult  Goal: Free from fall injury  6/11/2024 1543 by Nicole Rivera RN  Outcome: Met  6/11/2024 0721 by Nicole Rivera RN  Outcome: Progressing     Problem: Discharge Planning  Goal: Discharge to home or other facility with appropriate resources  6/11/2024 1543 by Nicole Rivera RN  Outcome: Met  6/11/2024 0721 by Nicole Rivera RN  Outcome: Progressing

## 2024-06-11 NOTE — DISCHARGE SUMMARY
Discharge Diagnosis  Acute Bulmaro ulcer    Issues Requiring Follow-Up   Patient needs to follow up with Dr. Negron in 2 weeks and maintain pureed diet    Test Results Pending At Discharge  Pending Labs       Order Current Status    Surgical Pathology Exam In process            Hospital Course   34 year old male with a history significant for Hill grade IV hiatal hernia with bleeding Bulmaro lesions causing severe anemia requiring admission/blood transfusions presented to [facility] on 6/10 for an elective Robotic assisted hiatal hernia repair.   OR findings significant for Giant paraesophageal hernia with more than 50% of stomach in chest. Copious perigastric fatty tissue obscuring vagus nerves. Difficult dissection with thick scar tissue and large sac. 3 cm of intraabdominal esophagus at end of case, Nissen fundoplication and gastropexy. EGD showed esophagitis and islands of gastric mucosa another 3 cm above the z-line.     Patient's postoperative course was uncomplicated. He did have some postoperative anemia down from baseline, but repeat H&H on day of discharge did not demonstrate ongoing decrease in blood counts. He had an expected elevation of transaminases, but bilirubin was normal and he did not have any significant RUQ pain. He was endorsing some referred shoulder pain that was not unexpected.     On day of discharge all vital signs, laboratory data, and physical exam findings were reviewed and patient was deemed appropriate for hospital discharge. At the time of discharge, patient's pain was controlled with oral analgesia, patient was urinating, having BMs, sleeping, and eating well. Patient was discharged home with scripts and follow up appointments. Discharge plan was discussed with the patient and all of the patient's questions were answered. Patient verbalized understanding of discharge instructions and was able to perform read-back technique.       Pertinent Physical Exam At Time of  Discharge  Physical Exam  Constitutional:       General: He is not in acute distress.     Appearance: He is normal weight. He is not ill-appearing or toxic-appearing.   HENT:      Head: Normocephalic.      Nose: Nose normal.      Mouth/Throat:      Mouth: Mucous membranes are moist.   Eyes:      General: No scleral icterus.  Cardiovascular:      Rate and Rhythm: Normal rate and regular rhythm.      Pulses: Normal pulses.      Heart sounds: No murmur heard.  Pulmonary:      Effort: Pulmonary effort is normal. No respiratory distress.      Breath sounds: Normal breath sounds.      Comments: Able to pull 1.5L on IS with good effort   Abdominal:      General: Bowel sounds are decreased. There is no distension.      Palpations: Abdomen is soft.      Tenderness: There is no abdominal tenderness.      Comments: Laparoscopic incisions STACY, skin glue- well approximated skin. No significant ecchymosis    Skin:     General: Skin is warm and dry.      Coloration: Skin is not jaundiced.      Findings: No bruising.   Neurological:      Mental Status: He is alert and oriented to person, place, and time.   Psychiatric:         Mood and Affect: Mood normal.         Behavior: Behavior normal.         Home Medications     Medication List      START taking these medications     dexAMETHasone 4 mg tablet; Commonly known as: Decadron; Take 1 tablet (4   mg) by mouth every 12 hours for 5 days.   metoclopramide 10 mg tablet; Commonly known as: Reglan; Take 1 tablet   (10 mg) by mouth 4 times a day before meals for 5 days.   ondansetron ODT 4 mg disintegrating tablet; Commonly known as:   Zofran-ODT; Take 1 tablet (4 mg) by mouth every 8 hours if needed for   nausea or vomiting.   oxyCODONE 5 mg immediate release tablet; Commonly known as: Roxicodone;   Take 1 tablet (5 mg) by mouth 3 times a day as needed for severe pain (7 -   10).   simethicone 80 mg chewable tablet; Commonly known as: Mylicon; Chew 0.5   tablets (40 mg) once daily for 4  days.; Start taking on: June 12, 2024     CONTINUE taking these medications     melatonin 3 mg tablet   multivitamin tablet   pantoprazole 40 mg EC tablet; Commonly known as: ProtoNix; Take 1 tablet   (40 mg) by mouth 2 times a day before meals. Do not crush, chew, or split.     STOP taking these medications     ferrous sulfate (325 mg ferrous sulfate) tablet       Outpatient Follow-Up  Future Appointments   Date Time Provider Department Center   6/25/2024 10:00 AM Ashok Negron MD PhD [id number] [place]       Frances Lorenzana, APRN-CNP

## 2024-06-24 ENCOUNTER — APPOINTMENT (OUTPATIENT)
Dept: RADIOLOGY | Facility: HOSPITAL | Age: 34
End: 2024-06-24
Payer: COMMERCIAL

## 2024-06-24 ENCOUNTER — HOSPITAL ENCOUNTER (INPATIENT)
Facility: HOSPITAL | Age: 34
LOS: 2 days | Discharge: HOME | End: 2024-06-27
Attending: EMERGENCY MEDICINE | Admitting: STUDENT IN AN ORGANIZED HEALTH CARE EDUCATION/TRAINING PROGRAM
Payer: COMMERCIAL

## 2024-06-24 ENCOUNTER — APPOINTMENT (OUTPATIENT)
Dept: CARDIOLOGY | Facility: HOSPITAL | Age: 34
End: 2024-06-24
Payer: COMMERCIAL

## 2024-06-24 DIAGNOSIS — R50.82 POSTOPERATIVE FEVER: ICD-10-CM

## 2024-06-24 DIAGNOSIS — M79.604 PAIN IN BOTH LOWER EXTREMITIES: Primary | ICD-10-CM

## 2024-06-24 DIAGNOSIS — M79.605 PAIN IN BOTH LOWER EXTREMITIES: Primary | ICD-10-CM

## 2024-06-24 LAB
ALBUMIN SERPL BCP-MCNC: 4 G/DL (ref 3.4–5)
ALP SERPL-CCNC: 55 U/L (ref 33–120)
ALT SERPL W P-5'-P-CCNC: 43 U/L (ref 10–52)
ANION GAP SERPL CALC-SCNC: 14 MMOL/L (ref 10–20)
APPEARANCE UR: CLEAR
AST SERPL W P-5'-P-CCNC: 20 U/L (ref 9–39)
BASOPHILS # BLD AUTO: 0.01 X10*3/UL (ref 0–0.1)
BASOPHILS NFR BLD AUTO: 0.2 %
BILIRUB SERPL-MCNC: 0.4 MG/DL (ref 0–1.2)
BILIRUB UR STRIP.AUTO-MCNC: NEGATIVE MG/DL
BUN SERPL-MCNC: 13 MG/DL (ref 6–23)
CALCIUM SERPL-MCNC: 9.5 MG/DL (ref 8.6–10.3)
CHLORIDE SERPL-SCNC: 102 MMOL/L (ref 98–107)
CO2 SERPL-SCNC: 25 MMOL/L (ref 21–32)
COLOR UR: YELLOW
CREAT SERPL-MCNC: 0.81 MG/DL (ref 0.5–1.3)
DACRYOCYTES BLD QL SMEAR: NORMAL
EGFRCR SERPLBLD CKD-EPI 2021: >90 ML/MIN/1.73M*2
EOSINOPHIL # BLD AUTO: 0.02 X10*3/UL (ref 0–0.7)
EOSINOPHIL NFR BLD AUTO: 0.4 %
ERYTHROCYTE [DISTWIDTH] IN BLOOD BY AUTOMATED COUNT: 20.3 % (ref 11.5–14.5)
FLUAV RNA RESP QL NAA+PROBE: NOT DETECTED
FLUBV RNA RESP QL NAA+PROBE: NOT DETECTED
GLUCOSE SERPL-MCNC: 108 MG/DL (ref 74–99)
GLUCOSE UR STRIP.AUTO-MCNC: NEGATIVE MG/DL
HCT VFR BLD AUTO: 34.9 % (ref 41–52)
HGB BLD-MCNC: 10.6 G/DL (ref 13.5–17.5)
HYPOCHROMIA BLD QL SMEAR: NORMAL
IMM GRANULOCYTES # BLD AUTO: 0.06 X10*3/UL (ref 0–0.7)
IMM GRANULOCYTES NFR BLD AUTO: 1.1 % (ref 0–0.9)
KETONES UR STRIP.AUTO-MCNC: ABNORMAL MG/DL
LEUKOCYTE ESTERASE UR QL STRIP.AUTO: NEGATIVE
LYMPHOCYTES # BLD AUTO: 0.23 X10*3/UL (ref 1.2–4.8)
LYMPHOCYTES NFR BLD AUTO: 4.1 %
MCH RBC QN AUTO: 22.2 PG (ref 26–34)
MCHC RBC AUTO-ENTMCNC: 30.4 G/DL (ref 32–36)
MCV RBC AUTO: 73 FL (ref 80–100)
MONOCYTES # BLD AUTO: 0.59 X10*3/UL (ref 0.1–1)
MONOCYTES NFR BLD AUTO: 10.5 %
NEUTROPHILS # BLD AUTO: 4.72 X10*3/UL (ref 1.2–7.7)
NEUTROPHILS NFR BLD AUTO: 83.7 %
NITRITE UR QL STRIP.AUTO: NEGATIVE
NRBC BLD-RTO: 0 /100 WBCS (ref 0–0)
OVALOCYTES BLD QL SMEAR: NORMAL
PH UR STRIP.AUTO: 7 [PH]
PLATELET # BLD AUTO: 328 X10*3/UL (ref 150–450)
POLYCHROMASIA BLD QL SMEAR: NORMAL
POTASSIUM SERPL-SCNC: 3.8 MMOL/L (ref 3.5–5.3)
PROT SERPL-MCNC: 6.6 G/DL (ref 6.4–8.2)
PROT UR STRIP.AUTO-MCNC: ABNORMAL MG/DL
RBC # BLD AUTO: 4.77 X10*6/UL (ref 4.5–5.9)
RBC # UR STRIP.AUTO: NEGATIVE /UL
RBC #/AREA URNS AUTO: NORMAL /HPF
RBC MORPH BLD: NORMAL
SARS-COV-2 RNA RESP QL NAA+PROBE: NOT DETECTED
SODIUM SERPL-SCNC: 137 MMOL/L (ref 136–145)
SP GR UR STRIP.AUTO: 1.01
SQUAMOUS #/AREA URNS AUTO: NORMAL /HPF
TARGETS BLD QL SMEAR: NORMAL
UROBILINOGEN UR STRIP.AUTO-MCNC: ABNORMAL MG/DL
WBC # BLD AUTO: 5.6 X10*3/UL (ref 4.4–11.3)
WBC #/AREA URNS AUTO: NORMAL /HPF

## 2024-06-24 PROCEDURE — 82550 ASSAY OF CK (CPK): CPT

## 2024-06-24 PROCEDURE — 80053 COMPREHEN METABOLIC PANEL: CPT | Performed by: PHYSICIAN ASSISTANT

## 2024-06-24 PROCEDURE — 2550000001 HC RX 255 CONTRASTS: Performed by: PHYSICIAN ASSISTANT

## 2024-06-24 PROCEDURE — 36415 COLL VENOUS BLD VENIPUNCTURE: CPT | Performed by: PHYSICIAN ASSISTANT

## 2024-06-24 PROCEDURE — 81001 URINALYSIS AUTO W/SCOPE: CPT | Performed by: PHYSICIAN ASSISTANT

## 2024-06-24 PROCEDURE — 93970 EXTREMITY STUDY: CPT

## 2024-06-24 PROCEDURE — 74177 CT ABD & PELVIS W/CONTRAST: CPT

## 2024-06-24 PROCEDURE — G0378 HOSPITAL OBSERVATION PER HR: HCPCS

## 2024-06-24 PROCEDURE — 71046 X-RAY EXAM CHEST 2 VIEWS: CPT

## 2024-06-24 PROCEDURE — 87040 BLOOD CULTURE FOR BACTERIA: CPT | Mod: PARLAB | Performed by: PHYSICIAN ASSISTANT

## 2024-06-24 PROCEDURE — 93970 EXTREMITY STUDY: CPT | Performed by: RADIOLOGY

## 2024-06-24 PROCEDURE — 74177 CT ABD & PELVIS W/CONTRAST: CPT | Mod: FOREIGN READ | Performed by: RADIOLOGY

## 2024-06-24 PROCEDURE — 96375 TX/PRO/DX INJ NEW DRUG ADDON: CPT

## 2024-06-24 PROCEDURE — 96367 TX/PROPH/DG ADDL SEQ IV INF: CPT

## 2024-06-24 PROCEDURE — 2500000004 HC RX 250 GENERAL PHARMACY W/ HCPCS (ALT 636 FOR OP/ED): Performed by: EMERGENCY MEDICINE

## 2024-06-24 PROCEDURE — 99285 EMERGENCY DEPT VISIT HI MDM: CPT | Mod: 25

## 2024-06-24 PROCEDURE — 96361 HYDRATE IV INFUSION ADD-ON: CPT

## 2024-06-24 PROCEDURE — 96365 THER/PROPH/DIAG IV INF INIT: CPT

## 2024-06-24 PROCEDURE — 87636 SARSCOV2 & INF A&B AMP PRB: CPT | Performed by: PHYSICIAN ASSISTANT

## 2024-06-24 PROCEDURE — 96366 THER/PROPH/DIAG IV INF ADDON: CPT

## 2024-06-24 PROCEDURE — 71046 X-RAY EXAM CHEST 2 VIEWS: CPT | Performed by: RADIOLOGY

## 2024-06-24 PROCEDURE — 93005 ELECTROCARDIOGRAM TRACING: CPT

## 2024-06-24 PROCEDURE — 85025 COMPLETE CBC W/AUTO DIFF WBC: CPT | Performed by: PHYSICIAN ASSISTANT

## 2024-06-24 PROCEDURE — 2500000004 HC RX 250 GENERAL PHARMACY W/ HCPCS (ALT 636 FOR OP/ED): Performed by: PHYSICIAN ASSISTANT

## 2024-06-24 RX ORDER — ACETAMINOPHEN 325 MG/1
975 TABLET ORAL ONCE
Status: COMPLETED | OUTPATIENT
Start: 2024-06-24 | End: 2024-06-24

## 2024-06-24 RX ORDER — FLUCONAZOLE 2 MG/ML
400 INJECTION, SOLUTION INTRAVENOUS ONCE
Status: COMPLETED | OUTPATIENT
Start: 2024-06-24 | End: 2024-06-25

## 2024-06-24 RX ORDER — ACETAMINOPHEN 500 MG
5 TABLET ORAL NIGHTLY
Status: DISCONTINUED | OUTPATIENT
Start: 2024-06-24 | End: 2024-06-27 | Stop reason: HOSPADM

## 2024-06-24 RX ORDER — ACETAMINOPHEN 325 MG/1
650 TABLET ORAL EVERY 8 HOURS PRN
Status: DISCONTINUED | OUTPATIENT
Start: 2024-06-24 | End: 2024-06-27 | Stop reason: HOSPADM

## 2024-06-24 RX ORDER — MORPHINE SULFATE 4 MG/ML
4 INJECTION, SOLUTION INTRAMUSCULAR; INTRAVENOUS ONCE
Status: DISCONTINUED | OUTPATIENT
Start: 2024-06-24 | End: 2024-06-24

## 2024-06-24 RX ORDER — POLYETHYLENE GLYCOL 3350 17 G/17G
17 POWDER, FOR SOLUTION ORAL DAILY
Status: DISCONTINUED | OUTPATIENT
Start: 2024-06-25 | End: 2024-06-27 | Stop reason: HOSPADM

## 2024-06-24 RX ORDER — OXYCODONE HYDROCHLORIDE 5 MG/1
10 TABLET ORAL EVERY 6 HOURS PRN
Status: DISCONTINUED | OUTPATIENT
Start: 2024-06-24 | End: 2024-06-27 | Stop reason: HOSPADM

## 2024-06-24 RX ORDER — ACETAMINOPHEN 325 MG/1
650 TABLET ORAL EVERY 8 HOURS
Status: DISCONTINUED | OUTPATIENT
Start: 2024-06-24 | End: 2024-06-24

## 2024-06-24 RX ORDER — ONDANSETRON HYDROCHLORIDE 2 MG/ML
4 INJECTION, SOLUTION INTRAVENOUS ONCE
Status: COMPLETED | OUTPATIENT
Start: 2024-06-24 | End: 2024-06-24

## 2024-06-24 RX ORDER — FLUCONAZOLE 2 MG/ML
400 INJECTION, SOLUTION INTRAVENOUS EVERY 24 HOURS
Status: DISCONTINUED | OUTPATIENT
Start: 2024-06-25 | End: 2024-06-25

## 2024-06-24 RX ORDER — OXYCODONE HYDROCHLORIDE 5 MG/1
5 TABLET ORAL EVERY 4 HOURS PRN
Status: DISCONTINUED | OUTPATIENT
Start: 2024-06-24 | End: 2024-06-27 | Stop reason: HOSPADM

## 2024-06-24 ASSESSMENT — PAIN - FUNCTIONAL ASSESSMENT: PAIN_FUNCTIONAL_ASSESSMENT: 0-10

## 2024-06-24 ASSESSMENT — COLUMBIA-SUICIDE SEVERITY RATING SCALE - C-SSRS
1. IN THE PAST MONTH, HAVE YOU WISHED YOU WERE DEAD OR WISHED YOU COULD GO TO SLEEP AND NOT WAKE UP?: NO
6. HAVE YOU EVER DONE ANYTHING, STARTED TO DO ANYTHING, OR PREPARED TO DO ANYTHING TO END YOUR LIFE?: NO
2. HAVE YOU ACTUALLY HAD ANY THOUGHTS OF KILLING YOURSELF?: NO

## 2024-06-24 ASSESSMENT — PAIN SCALES - GENERAL: PAINLEVEL_OUTOF10: 8

## 2024-06-24 NOTE — ED PROVIDER NOTES
Limitations to History: None  External Records Reviewed  Independent Historians: None   Social determinants affecting care: none    HPI  Daryl Win is a 34 y.o. male who presents emergency department with his mother for assessment of leg pain for 2 days.  Reports that he is having pain in both of his hips and his knees.  Pain has been constant and achy.  He reports that he is also having fevers at home.  He has not had any nausea, vomiting, diarrhea.  He denies any chest pains or shortness of breath.  He denies any dysuria, hematuria, urinary frequency, urinary hesitancy.  He denies any low back pain.  He has not had any cough or congestion.  He reports that 2 weeks ago he had hiatal hernia surgery and he was doing well at home until 2 days ago.  Movement seems to make his leg pain worse.  Nothing really alleviates his leg pain.  He has no further complaints.    PMH  Past Medical History:   Diagnosis Date    Asthma ([facility])     GERD (gastroesophageal reflux disease)     Tachycardia 05/22/2024    reviewed by myself.    Meds  Current Outpatient Medications   Medication Instructions    dexAMETHasone (DECADRON) 4 mg, oral, Every 12 hours scheduled    melatonin 3 mg, oral, Daily PRN    metoclopramide (REGLAN) 10 mg, oral, 4 times daily before meals and nightly    multivitamin tablet 1 tablet, oral, Daily    ondansetron ODT (ZOFRAN-ODT) 4 mg, oral, Every 8 hours PRN    oxyCODONE (ROXICODONE) 5 mg, oral, 3 times daily PRN    pantoprazole (PROTONIX) 40 mg, oral, 2 times daily before meals, Do not crush, chew, or split.       Allergies  No Known Allergies reviewed by myself.    SHx  Social History     Tobacco Use    Smoking status: Never    Smokeless tobacco: Never   Vaping Use    Vaping status: Never Used   Substance Use Topics    Alcohol use: Yes     Alcohol/week: 3.0 standard drinks of alcohol     Types: 3 Cans of beer per week    Drug use: Never    reviewed by  "myself.      ------------------------------------------------------------------------------------------------------------------------------------------    BP (!) 141/93 (BP Location: Left arm, Patient Position: Sitting)   Pulse (!) 110   Temp (!) 39.5 °C (103.1 °F) (Skin)   Resp 18   Ht 1.778 m (5' 10\")   Wt 83.9 kg (185 lb)   SpO2 97%   BMI 26.54 kg/m²     Physical Exam  Vitals and nursing note reviewed.   Constitutional:       General: He is not in acute distress.     Appearance: Normal appearance. He is normal weight. He is not ill-appearing or toxic-appearing.   HENT:      Head: Normocephalic.      Nose: Nose normal.      Mouth/Throat:      Mouth: Mucous membranes are moist.   Eyes:      Extraocular Movements: Extraocular movements intact.      Conjunctiva/sclera: Conjunctivae normal.   Cardiovascular:      Rate and Rhythm: Regular rhythm. Tachycardia present.   Pulmonary:      Effort: Pulmonary effort is normal.      Breath sounds: Normal breath sounds.   Abdominal:      General: Abdomen is flat. Bowel sounds are normal.      Palpations: Abdomen is soft.      Tenderness: There is no abdominal tenderness.      Comments: Surgical sites are healing well.  No erythema or drainage.   Musculoskeletal:      Cervical back: Neck supple.      Comments: No midline tenderness to palpation of the cervical, thoracic, or lumbar spine.  No tenderness palpation of the hips, knees, or ankles bilaterally.  He has full active range of motion of the hip joints, knee joints, and ankle joints with extension and flexion.  Sensation intact distally.  Capillary refill is brisk.  Compartments are soft and compressible to the lower extremities.  Strength is equal to bilateral lower extremities with all movements.   Skin:     General: Skin is warm and dry.   Neurological:      Mental Status: He is alert and oriented to person, place, and time.   Psychiatric:         Attention and Perception: Attention normal.         Mood and " Affect: Mood normal.          ------------------------------------------------------------------------------------------------------------------------------------------  Labs  Labs Reviewed   CBC WITH AUTO DIFFERENTIAL - Abnormal       Result Value    WBC 5.6      nRBC 0.0      RBC 4.77      Hemoglobin 10.6 (*)     Hematocrit 34.9 (*)     MCV 73 (*)     MCH 22.2 (*)     MCHC 30.4 (*)     RDW 20.3 (*)     Platelets 328      Neutrophils % 83.7      Immature Granulocytes %, Automated 1.1 (*)     Lymphocytes % 4.1      Monocytes % 10.5      Eosinophils % 0.4      Basophils % 0.2      Neutrophils Absolute 4.72      Immature Granulocytes Absolute, Automated 0.06      Lymphocytes Absolute 0.23 (*)     Monocytes Absolute 0.59      Eosinophils Absolute 0.02      Basophils Absolute 0.01     COMPREHENSIVE METABOLIC PANEL - Abnormal    Glucose 108 (*)     Sodium 137      Potassium 3.8      Chloride 102      Bicarbonate 25      Anion Gap 14      Urea Nitrogen 13      Creatinine 0.81      eGFR >90      Calcium 9.5      Albumin 4.0      Alkaline Phosphatase 55      Total Protein 6.6      AST 20      Bilirubin, Total 0.4      ALT 43     SARS-COV-2 PCR - Normal    Coronavirus 2019, PCR Not Detected      Narrative:     This assay has received FDA Emergency Use Authorization (EUA) and is only authorized for the duration of time that circumstances exist to justify the authorization of the emergency use of in vitro diagnostic tests for the detection of SARS-CoV-2 virus and/or diagnosis of COVID-19 infection under section 564(b)(1) of the Act, 21 U.S.C. 360bbb-3(b)(1). This assay is an in vitro diagnostic nucleic acid amplification test for the qualitative detection of SARS-CoV-2 from nasopharyngeal specimens and has been validated for use at [facility]. Negative results do not preclude COVID-19 infections and should not be used as the sole basis for diagnosis, treatment, or other management decisions.      INFLUENZA A AND B PCR - Normal    Flu A Result Not Detected      Flu B Result Not Detected      Narrative:     This assay is an in vitro diagnostic multiplex nucleic acid amplification test for the detection and discrimination of Influenza A & B from nasopharyngeal specimens, and has been validated for use at [facility]. Negative results do not preclude Influenza A/B infections, and should not be used as the sole basis for diagnosis, treatment, or other management decisions. If Influenza A/B and RSV PCR results are negative, testing for Parainfluenza virus, Adenovirus and Metapneumovirus is routinely performed for [facility] in[facility], and is available on other patients by placing an add-on request.   BLOOD CULTURE   BLOOD CULTURE   URINALYSIS WITH REFLEX CULTURE AND MICROSCOPIC    Narrative:     The following orders were created for panel order Urinalysis with Reflex Culture and Microscopic.  Procedure                               Abnormality         Status                     ---------                               -----------         ------                     Urinalysis with Reflex C...[[id number]]                      In process                 Extra Urine Gray Tube[[id number]]                            In process                   Please view results for these tests on the individual orders.   URINALYSIS WITH REFLEX CULTURE AND MICROSCOPIC   EXTRA URINE GRAY TUBE   MORPHOLOGY    RBC Morphology See Below      Polychromasia Mild      Hypochromia Mild      Target Cells Few      Ovalocytes Few      Teardrop Cells Few     URINALYSIS MICROSCOPIC WITH REFLEX CULTURE        Imaging  CT abdomen pelvis w IV contrast   Final Result   1. Apparent recent surgery at the GE junction/upper stomach, with   configuration suggesting fundoplication.   2. Moderate fluid in this location surrounds the GE junction and upper   stomach, and extends superiorly into the middle  mediastinum. There is   no abnormal air within the fluid.   3. Remaining bowel is remarkable for minimal air and fluid distention   in the small bowel. There is no free air.   4. Trace ascites.   5. Remainder as above.   Signed by Noe Neri MD      XR chest 2 views   Final Result   1.  No evidence of acute cardiopulmonary process.                  MACRO:   None        Signed by: Yonatan Covarrubias 6/24/2024 4:36 PM   Dictation workstation:   [id number]      Vascular US lower extremity venous duplex bilateral    (Results Pending)        ED Course  Diagnoses as of 06/24/24 2136   Pain in both lower extremities   Postoperative fever        Medical Decision Making: He did not appear ill or toxic.  Vital signs reviewed from triage.  He is hypertensive and tachycardic.  Otherwise hemodynamically stable.    Differential diagnoses considered: Intra-abdominal abscess, pneumonia, UTI, viral illness, electrolyte abnormalities, others    Medications given: IV morphine, IV Zofran, IV fluids    EKG interpreted by myself and ED attending: Sinus tachycardia.  Ventricular rate 112 bpm.  No acute ST elevations or depressions.    I reviewed the labs from today.  No leukocytosis or leukopenia.  Hemoglobin 10.6 with [place] 34.9.  Platelets are normal.  Glucose 108.  BUN and creatinine normal.  COVID-negative.  Influenza negative.  Chest x-ray showing no acute cardiopulmonary process.    Patient updated on findings and results.  Repeat vital signs obtained once he was back into an assessment room.  His heart rate has improved however he is febrile.  He was ordered IV morphine and IV Zofran for pain control and oral Tylenol for fever.    CT of the abdomen pelvis is showing a moderate fluid collection that surrounds the GE junction upper stomach that extends superiorly into the middle mediastinum.  I consulted general surgery.  I spoke with Dr. Wallace who recommends IV Zosyn and IV fluconazole.  He will assess the patient at bedside.   Case discussed and evaluated with ED attending, Dr. Kapoor.  Pain meds changed to IV Dilaudid instead of morphine.    General surgery assessed the patient.  He is unsure if this fluid collection really is causing him to have any of this leg pain.  He still recommends admission however to medicine.  I consulted general medicine on-call.  I spoke with Dr. Salazar to admit.  He would like duplex ultrasound and will follow.  Blood cultures added onto workup.    Diagnosis: Postop fluid collection, fever  Plan: Admit     Hemant Loza PA-C  06/24/24 0068

## 2024-06-24 NOTE — ED TRIAGE NOTES
The patient was seen and examined in triage.    History of Present Illness: The patient is a 34-year-old male presents emergency department due to body aches for the last 2 days.  He reports that he is having a lot of pain to his lower extremities in the joints.  He reports that has been having fevers at home.  He denies any chest pains or shortness of breath.  He does report that is abdomen is sore because he had hiatal hernia surgery 2 weeks ago.  He has not had any urinary symptoms.  He denies constipation or diarrhea.  He has no further complaints.    Brief Physical Exam:  Exam is limited by the patient sitting in a chair in triage.   Heart: Tachycardic rate and regular rhythm.   Lungs: Clear to auscultation bilaterally.   Abdomen: Soft, nondistended, normoactive bowel sounds. slight tenderness throughout.  Surgical sites well-healed without drainage or erythema.    Plan: Appropriate labs and diagnostic imaging were ordered.      For the remainder of the patient's workup and ED course, please refer to the main ED provider note. We discussed need for diagnostic testing including laboratory studies and imaging.  We also discussed that they may be asked to wait in the waiting room while these tests are pending.  They understand that if they choose to leave without having the testing completed or resulted that we cannot rule out acute life threatening illnesses and the risks involved could lead to worsening condition, permanent disability or even death.      Disclaimer: This note was dictated by speech recognition. Minor errors in transcription may be present. Please call if questions.

## 2024-06-25 ENCOUNTER — APPOINTMENT (OUTPATIENT)
Dept: RADIOLOGY | Facility: HOSPITAL | Age: 34
End: 2024-06-25
Payer: COMMERCIAL

## 2024-06-25 ENCOUNTER — APPOINTMENT (OUTPATIENT)
Dept: SURGERY | Facility: CLINIC | Age: 34
End: 2024-06-25
Payer: COMMERCIAL

## 2024-06-25 DIAGNOSIS — Z98.890 HISTORY OF REPAIR OF HIATAL HERNIA: ICD-10-CM

## 2024-06-25 DIAGNOSIS — Z87.19 HISTORY OF REPAIR OF HIATAL HERNIA: ICD-10-CM

## 2024-06-25 DIAGNOSIS — M62.838 MUSCLE SPASMS OF LOWER EXTREMITY, UNSPECIFIED LATERALITY: ICD-10-CM

## 2024-06-25 LAB
ALBUMIN SERPL BCP-MCNC: 3.4 G/DL (ref 3.4–5)
ANION GAP SERPL CALC-SCNC: 13 MMOL/L (ref 10–20)
BUN SERPL-MCNC: 13 MG/DL (ref 6–23)
CALCIUM SERPL-MCNC: 8.5 MG/DL (ref 8.6–10.3)
CHLORIDE SERPL-SCNC: 102 MMOL/L (ref 98–107)
CK SERPL-CCNC: 34 U/L (ref 0–325)
CO2 SERPL-SCNC: 24 MMOL/L (ref 21–32)
CREAT SERPL-MCNC: 0.84 MG/DL (ref 0.5–1.3)
EGFRCR SERPLBLD CKD-EPI 2021: >90 ML/MIN/1.73M*2
ERYTHROCYTE [DISTWIDTH] IN BLOOD BY AUTOMATED COUNT: 20.1 % (ref 11.5–14.5)
GLUCOSE SERPL-MCNC: 87 MG/DL (ref 74–99)
HCT VFR BLD AUTO: 29.6 % (ref 41–52)
HGB BLD-MCNC: 9 G/DL (ref 13.5–17.5)
HOLD SPECIMEN: NORMAL
MAGNESIUM SERPL-MCNC: 2.28 MG/DL (ref 1.6–2.4)
MCH RBC QN AUTO: 22.4 PG (ref 26–34)
MCHC RBC AUTO-ENTMCNC: 30.4 G/DL (ref 32–36)
MCV RBC AUTO: 74 FL (ref 80–100)
NRBC BLD-RTO: 0 /100 WBCS (ref 0–0)
PHOSPHATE SERPL-MCNC: 4.7 MG/DL (ref 2.5–4.9)
PLATELET # BLD AUTO: 243 X10*3/UL (ref 150–450)
POTASSIUM SERPL-SCNC: 3.9 MMOL/L (ref 3.5–5.3)
RBC # BLD AUTO: 4.02 X10*6/UL (ref 4.5–5.9)
SODIUM SERPL-SCNC: 135 MMOL/L (ref 136–145)
WBC # BLD AUTO: 3.8 X10*3/UL (ref 4.4–11.3)

## 2024-06-25 PROCEDURE — A9575 INJ GADOTERATE MEGLUMI 0.1ML: HCPCS | Performed by: STUDENT IN AN ORGANIZED HEALTH CARE EDUCATION/TRAINING PROGRAM

## 2024-06-25 PROCEDURE — 2500000004 HC RX 250 GENERAL PHARMACY W/ HCPCS (ALT 636 FOR OP/ED)

## 2024-06-25 PROCEDURE — 85027 COMPLETE CBC AUTOMATED: CPT

## 2024-06-25 PROCEDURE — 72158 MRI LUMBAR SPINE W/O & W/DYE: CPT

## 2024-06-25 PROCEDURE — 70553 MRI BRAIN STEM W/O & W/DYE: CPT

## 2024-06-25 PROCEDURE — 99223 1ST HOSP IP/OBS HIGH 75: CPT | Performed by: PSYCHIATRY & NEUROLOGY

## 2024-06-25 PROCEDURE — 99223 1ST HOSP IP/OBS HIGH 75: CPT

## 2024-06-25 PROCEDURE — 70553 MRI BRAIN STEM W/O & W/DYE: CPT | Performed by: RADIOLOGY

## 2024-06-25 PROCEDURE — 72158 MRI LUMBAR SPINE W/O & W/DYE: CPT | Performed by: RADIOLOGY

## 2024-06-25 PROCEDURE — 36415 COLL VENOUS BLD VENIPUNCTURE: CPT

## 2024-06-25 PROCEDURE — 83735 ASSAY OF MAGNESIUM: CPT

## 2024-06-25 PROCEDURE — 2500000001 HC RX 250 WO HCPCS SELF ADMINISTERED DRUGS (ALT 637 FOR MEDICARE OP)

## 2024-06-25 PROCEDURE — 2550000001 HC RX 255 CONTRASTS: Performed by: STUDENT IN AN ORGANIZED HEALTH CARE EDUCATION/TRAINING PROGRAM

## 2024-06-25 PROCEDURE — 80069 RENAL FUNCTION PANEL: CPT

## 2024-06-25 PROCEDURE — 1200000002 HC GENERAL ROOM WITH TELEMETRY DAILY

## 2024-06-25 RX ORDER — GADOTERATE MEGLUMINE 376.9 MG/ML
16 INJECTION INTRAVENOUS
Status: COMPLETED | OUTPATIENT
Start: 2024-06-25 | End: 2024-06-25

## 2024-06-25 SDOH — SOCIAL STABILITY: SOCIAL INSECURITY: ABUSE: ADULT

## 2024-06-25 SDOH — SOCIAL STABILITY: SOCIAL INSECURITY: DO YOU FEEL ANYONE HAS EXPLOITED OR TAKEN ADVANTAGE OF YOU FINANCIALLY OR OF YOUR PERSONAL PROPERTY?: NO

## 2024-06-25 SDOH — SOCIAL STABILITY: SOCIAL INSECURITY: HAVE YOU HAD ANY THOUGHTS OF HARMING ANYONE ELSE?: NO

## 2024-06-25 SDOH — SOCIAL STABILITY: SOCIAL INSECURITY: ARE YOU OR HAVE YOU BEEN THREATENED OR ABUSED PHYSICALLY, EMOTIONALLY, OR SEXUALLY BY ANYONE?: NO

## 2024-06-25 SDOH — SOCIAL STABILITY: SOCIAL INSECURITY: DOES ANYONE TRY TO KEEP YOU FROM HAVING/CONTACTING OTHER FRIENDS OR DOING THINGS OUTSIDE YOUR HOME?: NO

## 2024-06-25 SDOH — SOCIAL STABILITY: SOCIAL INSECURITY: HAS ANYONE EVER THREATENED TO HURT YOUR FAMILY OR YOUR PETS?: NO

## 2024-06-25 SDOH — SOCIAL STABILITY: SOCIAL INSECURITY: WERE YOU ABLE TO COMPLETE ALL THE BEHAVIORAL HEALTH SCREENINGS?: YES

## 2024-06-25 SDOH — SOCIAL STABILITY: SOCIAL INSECURITY: DO YOU FEEL UNSAFE GOING BACK TO THE PLACE WHERE YOU ARE LIVING?: NO

## 2024-06-25 SDOH — SOCIAL STABILITY: SOCIAL INSECURITY: ARE THERE ANY APPARENT SIGNS OF INJURIES/BEHAVIORS THAT COULD BE RELATED TO ABUSE/NEGLECT?: NO

## 2024-06-25 SDOH — SOCIAL STABILITY: SOCIAL INSECURITY: HAVE YOU HAD THOUGHTS OF HARMING ANYONE ELSE?: NO

## 2024-06-25 ASSESSMENT — LIFESTYLE VARIABLES
AUDIT-C TOTAL SCORE: 0
HOW OFTEN DO YOU HAVE A DRINK CONTAINING ALCOHOL: NEVER
EVER HAD A DRINK FIRST THING IN THE MORNING TO STEADY YOUR NERVES TO GET RID OF A HANGOVER: NO
HAVE PEOPLE ANNOYED YOU BY CRITICIZING YOUR DRINKING: NO
AUDIT-C TOTAL SCORE: 0
SKIP TO QUESTIONS 9-10: 1
HOW MANY STANDARD DRINKS CONTAINING ALCOHOL DO YOU HAVE ON A TYPICAL DAY: PATIENT DOES NOT DRINK
EVER FELT BAD OR GUILTY ABOUT YOUR DRINKING: NO
PRESCIPTION_ABUSE_PAST_12_MONTHS: NO
TOTAL SCORE: 0
SUBSTANCE_ABUSE_PAST_12_MONTHS: NO
HAVE YOU EVER FELT YOU SHOULD CUT DOWN ON YOUR DRINKING: NO
HOW OFTEN DO YOU HAVE 6 OR MORE DRINKS ON ONE OCCASION: NEVER

## 2024-06-25 ASSESSMENT — COGNITIVE AND FUNCTIONAL STATUS - GENERAL
STANDING UP FROM CHAIR USING ARMS: A LITTLE
TOILETING: A LITTLE
HELP NEEDED FOR BATHING: A LITTLE
WALKING IN HOSPITAL ROOM: A LITTLE
DAILY ACTIVITIY SCORE: 21
TOILETING: A LITTLE
DAILY ACTIVITIY SCORE: 21
CLIMB 3 TO 5 STEPS WITH RAILING: A LITTLE
STANDING UP FROM CHAIR USING ARMS: A LITTLE
MOBILITY SCORE: 20
MOBILITY SCORE: 20
CLIMB 3 TO 5 STEPS WITH RAILING: A LITTLE
DRESSING REGULAR LOWER BODY CLOTHING: A LITTLE
HELP NEEDED FOR BATHING: A LITTLE
PATIENT BASELINE BEDBOUND: NO
WALKING IN HOSPITAL ROOM: A LITTLE
MOVING TO AND FROM BED TO CHAIR: A LITTLE
MOVING TO AND FROM BED TO CHAIR: A LITTLE
DRESSING REGULAR LOWER BODY CLOTHING: A LITTLE

## 2024-06-25 ASSESSMENT — ACTIVITIES OF DAILY LIVING (ADL)
WALKS IN HOME: NEEDS ASSISTANCE
JUDGMENT_ADEQUATE_SAFELY_COMPLETE_DAILY_ACTIVITIES: YES
DRESSING YOURSELF: NEEDS ASSISTANCE
PATIENT'S MEMORY ADEQUATE TO SAFELY COMPLETE DAILY ACTIVITIES?: YES
ADEQUATE_TO_COMPLETE_ADL: YES
GROOMING: INDEPENDENT
LACK_OF_TRANSPORTATION: NO
FEEDING YOURSELF: INDEPENDENT
TOILETING: NEEDS ASSISTANCE
HEARING - RIGHT EAR: FUNCTIONAL
BATHING: NEEDS ASSISTANCE
HEARING - LEFT EAR: FUNCTIONAL

## 2024-06-25 ASSESSMENT — PAIN - FUNCTIONAL ASSESSMENT
PAIN_FUNCTIONAL_ASSESSMENT: 0-10
PAIN_FUNCTIONAL_ASSESSMENT: 0-10

## 2024-06-25 ASSESSMENT — PAIN SCALES - GENERAL
PAINLEVEL_OUTOF10: 0 - NO PAIN
PAINLEVEL_OUTOF10: 5 - MODERATE PAIN
PAINLEVEL_OUTOF10: 4
PAINLEVEL_OUTOF10: 4
PAINLEVEL_OUTOF10: 5 - MODERATE PAIN
PAINLEVEL_OUTOF10: 7

## 2024-06-25 ASSESSMENT — PAIN DESCRIPTION - LOCATION: LOCATION: LEG

## 2024-06-25 ASSESSMENT — PATIENT HEALTH QUESTIONNAIRE - PHQ9
SUM OF ALL RESPONSES TO PHQ9 QUESTIONS 1 & 2: 0
2. FEELING DOWN, DEPRESSED OR HOPELESS: NOT AT ALL
1. LITTLE INTEREST OR PLEASURE IN DOING THINGS: NOT AT ALL

## 2024-06-25 ASSESSMENT — ENCOUNTER SYMPTOMS: WEAKNESS: 1

## 2024-06-25 ASSESSMENT — PAIN DESCRIPTION - ORIENTATION: ORIENTATION: RIGHT;LEFT

## 2024-06-25 NOTE — H&P
History Of Present Illness  Daryl Win is a 34 y.o. male with history of grade 4 hiatal hernia status post paraesophageal hernia repair on 6/10/2024 presented to the ED on  with concerns of fever, lower extremity pain and weakness.  Postop, patient has been healing well, been able to advance his diet from liquids to few solid foods, additionally has been ambulating without difficulty.  Yesterday morning, patient suddenly began having shooting pains down bilateral hips, down to his legs and ankles, making it difficult for him to ambulate.  He describes it as an unsteadiness that he has on his feet, with associated cramping, making it incredibly uncomfortable for him to walk.  States he experienced something similarly in his childhood after viral illness.  Denies any recent illness, however.  Has tried Tylenol, Aleve, Advil without relief in symptoms.  Notes that he had a fever of 102 Fahrenheit with associated chills at home.  Had sudden onset achiness of his ankles and knees yesterday as well.  Otherwise, no issues recently.    ED course as follows. Febrile. tachycardic.  Hypertensive.  Metabolic panel WNL.  No leukocytosis.  CT abdomen pelvis showing moderate fluid surrounding GE junction and upper stomach extending superiorly into pneumomediastinum, with no abnormal air within the fluid, no free air.  Surgery team placed on consult, would like Zosyn and fluconazole to be initiated.    -Medical/surgical history: As above  -Family history: Mother (history of endometrial cancer), father ( from GI cancer), sister without any medical issues  -Social history: No tobacco use, occasional alcohol use, no illicit drug use, lives at home with mother as he recently moved from [place]  -CODE STATUS: Full (discussed with the patient)    10 systems reviewed and negative except otherwise noted in HPI above.     Physical Exam  GENERAL: Well developed, no distress, alert and cooperative  HEENT: AT/NC, PERRL,  "EOMI  NECK: Normal Inspection  CARDIOVASCULAR: tachycardic, regular rhythm, no murmurs  RESPIRATORY: CTAB, normal breath sounds with good chest expansion, No Wheezes, Rales or Rhonchi  ABDOMEN: Recent trocar incision sites without surrounding erythema or drainage, soft, slightly tender, Normal Bowel Sounds, No Distention  Back: No paraspinal tenderness  SKIN: Warm and dry  EXTREMITIES: normal extremities, no lower extremity edema, preserved sensation in bilateral lower extremities in addition to motor strength, difficulty with weightbearing (requires full person assist)  NEURO: A&O x 3  PSYCH: Appropriate      Last Recorded Vitals  Blood pressure (!) 141/93, pulse (!) 110, temperature (!) 39.5 °C (103.1 °F), temperature source Skin, resp. rate 18, height 1.778 m (5' 10\"), weight 83.9 kg (185 lb), SpO2 97%.    Assessment/Plan     34 y.o. male with history of grade 4 hiatal hernia status post paraesophageal hernia repair on 6/10/2024 presented to the ED on 6/24 with concerns of fever, lower extremity pain and weakness.    #Concern for intra-abdominal infection  -CT abdomen pelvis showing moderate fluid surrounding GE junction and upper stomach extending superiorly into pneumomediastinum, with no abnormal air within the fluid, no free air. Surgery on consult, would like coverage with Zosyn and fluconazole 6/24-. Keep NPO, pending a.m. evaluation.    #Lower extremity weakness  -Sudden onset a.m. of 6/24. Had been ambulating without difficulty prior to this. No recent illness or injury. Sudden onset of symptoms is concerning; I attempted to assist the patient in ambulating however he was incredibly unsteady on his feet and unable to stand on his own.  -can monitor for now as could be in the setting of above. CPK not elevated. May warrant neurology consultation if worsening or lack of improvement in symptoms during hospital stay.    #Constipation  -Typically has a bowel movement every day.  Has not had 1 in 3 days.  Bowel " regimen ordered.    DVT ppx: none pharmacologic not indicated  CODE STATUS: Full (discussed with the patient)      Jacey Mendez DO  Internal Medicine, PGY-II

## 2024-06-25 NOTE — CONSULTS
Inpatient consult to Neurology  Consult performed by: Yonatan Hall MD  Consult ordered by: Sony Morales DO          History Of Present Illness  Daryl Win is a 34 y.o. male presenting with lower extremity weakness.  The patient states that on 6/10/2024 he had a paraesophageal hernia repair done for a grade 4 hiatal hernia and the procedure went very well.  The patient states that he was doing well until the day prior to admission when he woke up in the morning feeling fine at about 11:30 AM he began to experience shooting pain from his hips down to his legs bilaterally and this extended all the way to his ankles.  He characterized the pain as cramping, burning or electric.  At first the pain was intermittent and relieved with Tylenol however this was short-lived and the pain returned with consistency and increased severity.  The patient presented to the ER with complaints of a fever of 102 and chills.  The patient states that he had weakness in his lower extremities but he believes that it was secondary to pain.  He feels that he is much better today and is able to ambulate.  He feels that his lower extremity strength is essentially back to normal.  He denies any headaches, neck pain, back pain or constipation.  He is currently on a liquid diet and has not had a bowel movement in several weeks.  He states that he has been urinating normally and there is no urinary incontinence or retention.  In the ER, the patient had a temperature of 39.5 and was tachycardic in the low 100s.  The patient had a CT scan of the abdomen and pelvis that showed a recent fundoplication but no retroperitoneal hemorrhage was noted.      Past Medical History  Past Medical History:   Diagnosis Date    Asthma ([facility])     GERD (gastroesophageal reflux disease)     Tachycardia 05/22/2024     Surgical History  Past Surgical History:   Procedure Laterality Date    WISDOM TOOTH EXTRACTION       Social History  Social History  "    Tobacco Use    Smoking status: Never    Smokeless tobacco: Never   Vaping Use    Vaping status: Never Used   Substance Use Topics    Alcohol use: Yes     Alcohol/week: 3.0 standard drinks of alcohol     Types: 3 Cans of beer per week    Drug use: Never     Allergies  Patient has no known allergies.  Medications Prior to Admission   Medication Sig Dispense Refill Last Dose    dexAMETHasone (Decadron) 4 mg tablet Take 1 tablet (4 mg) by mouth every 12 hours for 5 days. 10 tablet 0     melatonin 3 mg tablet Take 1 tablet (3 mg) by mouth once daily as needed.       metoclopramide (Reglan) 10 mg tablet Take 1 tablet (10 mg) by mouth 4 times a day before meals for 5 days. 20 tablet 0     multivitamin tablet Take 1 tablet by mouth once daily.   6/19/2024    ondansetron ODT (Zofran-ODT) 4 mg disintegrating tablet Take 1 tablet (4 mg) by mouth every 8 hours if needed for nausea or vomiting. 20 tablet 0     oxyCODONE (Roxicodone) 5 mg immediate release tablet Take 1 tablet (5 mg) by mouth 3 times a day as needed for severe pain (7 - 10). 6 tablet 0     pantoprazole (ProtoNix) 40 mg EC tablet Take 1 tablet (40 mg) by mouth 2 times a day before meals. Do not crush, chew, or split. 60 tablet 0 6/22/2024     Family history  The patient's family history significant for stomach cancer in his father.    Review of Systems   Neurological:  Positive for weakness.   All other systems reviewed and are negative.        Neurological Exam  Physical Exam  Last Recorded Vitals  Blood pressure 129/79, pulse 99, temperature 36.9 °C (98.4 °F), resp. rate 16, height 1.778 m (5' 10\"), weight 83.9 kg (185 lb), SpO2 90%.    The patient is a well developed, [mildly obese] [male] in no acute distress.    The patient's funduscopic examination shows no papilledema bilaterally.    The patient's extremity examination shows that the pulses are 2+ in the upper and lower extremities bilaterally and there is no edema in the lower extremities " bilaterally.    The patient's mental status testing is alert and oriented ×3 with no evidence of aphasia or dysarthria.  The patient's memory testing, fund of knowledge and concentration are all within normal limits.  The patient's cranial nerves 2, 3, 4, 5, 6, 7, 8, 9, 10, 11 and 12 are all within normal limits.  The patient's motor testing shows normal tone, bulk, and power in the upper and lower extremities bilaterally.  The patient's sensory testing is intact to light touch in the upper and lower extremities bilaterally.  The patient's cerebellar testing is intact in the upper and lower extremities bilaterally.  The patient's station and gait are normal.  The patient's reflexes are 1+ in the upper and lower extremities and symmetrical.  Relevant Results    Calderon Coma Scale  Best Eye Response: Spontaneous  Best Verbal Response: Oriented  Best Motor Response: Follows commands  Calderon Coma Scale Score: 15            Scheduled medications  melatonin, 5 mg, oral, Nightly  polyethylene glycol, 17 g, oral, Daily      Continuous medications     PRN medications  PRN medications: acetaminophen, oxyCODONE, oxyCODONE    Results for orders placed or performed during the hospital encounter of 06/24/24 (from the past 24 hour(s))   Urinalysis with Reflex Culture and Microscopic   Result Value Ref Range    Color, Urine Yellow Straw, Yellow    Appearance, Urine Clear Clear    Specific Gravity, Urine 1.010 1.005 - 1.035    pH, Urine 7.0 5.0, 5.5, 6.0, 6.5, 7.0, 7.5, 8.0    Protein, Urine 10 (TRACE) NEGATIVE, 10 (TRACE) mg/dL    Glucose, Urine NEGATIVE NEGATIVE mg/dL    Blood, Urine NEGATIVE NEGATIVE    Ketones, Urine 5 (TRACE) (A) NEGATIVE mg/dL    Bilirubin, Urine NEGATIVE NEGATIVE    Urobilinogen, Urine NORM NORM mg/dL    Nitrite, Urine NEGATIVE NEGATIVE    Leukocyte Esterase, Urine NEGATIVE NEGATIVE   Extra Urine Gray Tube   Result Value Ref Range    Extra Tube Hold for add-ons.    Urinalysis Microscopic   Result Value Ref  Range    WBC, Urine NONE 1-5, NONE /HPF    RBC, Urine NONE NONE, 1-2, 3-5 /HPF    Squamous Epithelial Cells, Urine 1-9 (SPARSE) Reference range not established. /HPF   Blood Culture    Specimen: Peripheral Venipuncture; Blood culture   Result Value Ref Range    Blood Culture Loaded on Instrument - Culture in progress    Blood Culture    Specimen: Peripheral Venipuncture; Blood culture   Result Value Ref Range    Blood Culture Loaded on Instrument - Culture in progress    CBC   Result Value Ref Range    WBC 3.8 (L) 4.4 - 11.3 x10*3/uL    nRBC 0.0 0.0 - 0.0 /100 WBCs    RBC 4.02 (L) 4.50 - 5.90 x10*6/uL    Hemoglobin 9.0 (L) 13.5 - 17.5 g/dL    Hematocrit 29.6 (L) 41.0 - 52.0 %    MCV 74 (L) 80 - 100 fL    MCH 22.4 (L) 26.0 - 34.0 pg    MCHC 30.4 (L) 32.0 - 36.0 g/dL    RDW 20.1 (H) 11.5 - 14.5 %    Platelets 243 150 - 450 x10*3/uL   Renal Function Panel   Result Value Ref Range    Glucose 87 74 - 99 mg/dL    Sodium 135 (L) 136 - 145 mmol/L    Potassium 3.9 3.5 - 5.3 mmol/L    Chloride 102 98 - 107 mmol/L    Bicarbonate 24 21 - 32 mmol/L    Anion Gap 13 10 - 20 mmol/L    Urea Nitrogen 13 6 - 23 mg/dL    Creatinine 0.84 0.50 - 1.30 mg/dL    eGFR >90 >60 mL/min/1.73m*2    Calcium 8.5 (L) 8.6 - 10.3 mg/dL    Phosphorus 4.7 2.5 - 4.9 mg/dL    Albumin 3.4 3.4 - 5.0 g/dL   Magnesium   Result Value Ref Range    Magnesium 2.28 1.60 - 2.40 mg/dL          I have personally reviewed the following imaging results MR lumbar spine w and wo IV contrast    Result Date: 6/25/2024  Interpreted By:  Narda Quigley, STUDY: MR LUMBAR SPINE W AND WO IV CONTRAST;  6/25/2024 11:42 am   INDICATION: Signs/Symptoms:pt w sudden onset b/l LE weakness and no bowel movements concern for cord compression.   COMPARISON: None.   ACCESSION NUMBER(S): [id number]   ORDERING CLINICIAN: AUNDREA MENDEZ   TECHNIQUE: Sagittal T1, T2, STIR, axial T1 and T2 weighted images of the lumbar spine were acquired. Axial and sagittal post-contrast T1 weighted  images were obtained following administration of 16 mL of Dotarem.   FINDINGS: Alignment: There is minimal, grade 1 retrolisthesis of L4 on L5. There is partial sacralization of L5.   Vertebrae/Intervertebral Discs: The vertebral bodies demonstrate expected height.  The marrow signal is within normal limits. There is mild disc desiccation at L4-5.   Conus: The lower thoracic cord appears unremarkable. The conus terminates at L1. There is no abnormal enhancement of the conus or nerve roots of the cauda equina..   T12-L1:  There is no significant central canal or neural foraminal stenosis. L1-2:  There is no significant central canal or neural foraminal stenosis. L2-3:  There is no significant central canal or neural foraminal stenosis. L3-4:  Minimal disc bulging and degenerative facet arthropathy do not narrow the central canal or neuroforamina. L4-5: There is mild degenerate facet arthropathy and ligamentum flavum hypertrophy, right greater than left. Small central disc protrusion is also noted with minimal impression on the ventral thecal sac. There is no significant central canal or neural foraminal stenosis. L5-S1:  There is no significant central canal or neural foraminal stenosis.   The prevertebral and posterior paraspinous soft tissues are unremarkable.       Small central disc protrusion at L4-5 does not significantly narrow the central canal or neuroforamina.   MACRO: None   Signed by: Narda Quigley 6/25/2024 11:49 AM Dictation workstation:   [id number]    MR brain w and wo IV contrast    Result Date: 6/25/2024  Interpreted By:  Narda Quigley, STUDY: MR BRAIN W AND WO IV CONTRAST;  6/25/2024 11:42 am   INDICATION: Signs/Symptoms:Acute LE weakness with unclear cause, no meningal signs currently.   COMPARISON: None.   ACCESSION NUMBER(S): [id number]   ORDERING CLINICIAN: AUNDREA MENDEZ   TECHNIQUE: Axial T2, FLAIR, DWI, gradient echo T2 and  T1 weighted images of brain were acquired. Post contrast  T1 weighted images were acquired after administration of 16 mL Dotarem gadolinium based intravenous contrast.   FINDINGS: CSF Spaces: There is prominence of ventricles and sulci for the patient's given age compatible with parenchymal volume loss.   Parenchyma: There is no diffusion restriction abnormality to suggest acute infarct.  There are minimal hyperintensities on FLAIR imaging in the white matter. There is no abnormal parenchymal enhancement. There is no mass effect or midline shift.   Paranasal Sinuses and Mastoids: Retention cysts or polyps are noted within the maxillary sinuses. There is mild mucosal thickening within scattered paranasal sinuses and opacification of a few inferior mastoid air cells on the right.   Increased signal within the clivus on diffusion-weighted imaging is nonspecific and could be due to artifact as there is no definite corresponding signal abnormality on T1 weighted imaging.       1. Mild parenchymal volume loss for the patient's given age. 2. No evidence of acute ischemic injury or enhancing intracranial mass. Very minimal white-matter changes are nonspecific and may be seen with chronic hypertension, migraine headaches, among others.       MACRO: None   Signed by: Narda Quigley 6/25/2024 11:46 AM Dictation workstation:   [id number]    ECG 12 lead    Result Date: 6/25/2024  Sinus tachycardia    Vascular US lower extremity venous duplex bilateral    Result Date: 6/24/2024  Interpreted By:  Ciara Monahan, STUDY: [facility] US LOWER EXTREMITY VENOUS DUPLEX BILATERAL;  6/24/2024 9:59 pm   INDICATION: Signs/Symptoms:Admitting physician requested, patient reporting leg pain, recent surgery.   COMPARISON: None.   ACCESSION NUMBER(S): [id number]   ORDERING CLINICIAN: ROCIO REEDER   TECHNIQUE: Grayscale, color and spectral Doppler sonographic images of the bilateral lower extremity deep venous system.   FINDINGS: RIGHT: There is normal compressibility of the common femoral vein,  saphenous femoral junction, femoral vein and popliteal vein. The posterior tibial and peroneal veins demonstrate normal color flow and compressibility. There is normal spontaneous and phasic variation throughout the leg by spectral doppler.   LEFT: There is normal compressibility of the common femoral vein, saphenous femoral junction, femoral vein and popliteal vein. The posterior tibial and peroneal veins demonstrate normal color flow and compressibility. There is normal spontaneous and phasic variation throughout the leg by spectral doppler.   OTHER FINDINGS: None.       No sonographic evidence DVT in the bilateral lower extremities.       MACRO: None   Signed by: Ciara Monahan 6/24/2024 10:42 PM Dictation workstation:   [id number]    CT abdomen pelvis w IV contrast    Result Date: 6/24/2024  STUDY: CT Abdomen and Pelvis with IV Contrast; 06/24/2024 at 7:10 PM INDICATION: Recent abdominal surgery (hernia). Fever. COMPARISON: None available. ACCESSION NUMBER(S): [id number] ORDERING CLINICIAN: ROCIO REEDER TECHNIQUE: CT of the abdomen and pelvis was performed.  Contiguous axial images were obtained at 3 mm slice thickness through the abdomen and pelvis. Coronal and sagittal reconstructions at 3 mm slice thickness were performed.  Omnipaque-350 75 mL was administered intravenously.  FINDINGS: Abdomen: By history there has been recent hernia surgery, apparently at the GE junction/upper stomach. Configuration suggests fundoplication. Moderate fluid in this location surrounds the GE junction and upper stomach, and extends superiorly into the middle mediastinum. There is no abnormal air within the fluid. The mid and distal stomach is minimally distended with mottled gastric contents. There is minimal air and fluid distention in the small bowel without thickening or obstruction. Appendix is normal. Moderate stool burden is seen throughout the colon. There is trace ascites. There is no free air. Liver, spleen,  pancreas, adrenal glands, and biliary system are unremarkable. Kidneys are normal except for a tiny cyst on the left. There are no renal calculi and there is no hydronephrosis. There is no significant lymphadenopathy in the abdomen or retroperitoneum. Abdominal aorta and IVC are normal caliber and patent. Pelvis: Additional minimal ascites collects dependently. The bladder is normally distended. The deep pelvic veins are patent. There are no inguinal hernias. Skeletal: Bony structures are intact. Lumbar spine alignment is anatomic. Lower chest: There are minimal pleural effusions, with adjacent focal atelectasis. Heart is normal size.    1. Apparent recent surgery at the GE junction/upper stomach, with configuration suggesting fundoplication. 2. Moderate fluid in this location surrounds the GE junction and upper stomach, and extends superiorly into the middle mediastinum. There is no abnormal air within the fluid. 3. Remaining bowel is remarkable for minimal air and fluid distention in the small bowel. There is no free air. 4. Trace ascites. 5. Remainder as above. Signed by Noe Neri MD    XR chest 2 views    Result Date: 6/24/2024  Interpreted By:  Yonatan Covarrubias, STUDY: XR CHEST 2 VIEWS;  6/24/2024 4:33 pm   INDICATION: Signs/Symptoms:fever.   COMPARISON: 01/27/2023   ACCESSION NUMBER(S): [id number]   ORDERING CLINICIAN: ROCIO REEDER   FINDINGS: PA and lateral radiographs of the chest were provided.   CARDIOMEDIASTINAL SILHOUETTE: Cardiomediastinal silhouette is stable in size and configuration. Hiatal hernia is again apparent.   LUNGS: Lungs are clear.   ABDOMEN: No remarkable upper abdominal findings.   BONES: No acute osseous changes.       1.  No evidence of acute cardiopulmonary process.       MACRO: None   Signed by: Yonatan Covarrubias 6/24/2024 4:36 PM Dictation workstation:   [id number]    FL upper GI w KUB    Result Date: 6/11/2024  Interpreted By:  Victor Hugo Lopez, STUDY: FL UPPER GI W KUB;  6/11/2024  9:35 am   INDICATION: Signs/Symptoms:hiatal hernia.   COMPARISON: None.   ACCESSION NUMBER(S): [id number]   ORDERING CLINICIAN: UMA FERNÁNDEZ   TECHNIQUE: An initial  radiograph of the abdomen was obtained. Oral Gastrografin in small aliquots was administered. Multiple dynamic and static fluoroscopic images of the  distal esophagus, gastric pouch, Dell limb and proximal small bowel were obtained. Total fluoroscopy time was 1.1 minutes.   FINDINGS: Initial  images demonstrate trace right subdiaphragmatic pneumoperitoneum likely related to recent surgery. Multiple gas distended bowel loops likely related to postoperative ileus.   Status post hiatal hernia repair. Gastrografin contrast passed freely through the distal esophagus into the stomach and more distally to the proximal small bowel loops. Transient holdup of contrast at the distal esophagus which propagated after would likely related to anastomotic edema. No major obstruction. No definite leak of contrast.       1. Post hiatal hernia repair. Unremarkable limited post bariatric examination, without evidence of obstruction or leak. 2. Trace right subdiaphragmatic pneumoperitoneum likely related to recent surgery. 3. Multiple gas distended bowel loops likely related to postoperative ileus.   Signed by: Victor Hugo Lopez 6/11/2024 10:05 AM Dictation workstation:   [id number]       Assessment/Plan   Impression: The patient is a 34-year-old male who recently had surgery for an hiatal hernia and did well.  He developed bilateral hip and leg pain with weakness.  His neurological examination is normal.  The differential diagnosis includes cervical stenosis, thoracic cord hemorrhage or abscess, transverse myelitis and MS.    Plan: The patient needs an MRI of the cervical and thoracic spines both with and without gadolinium.  The patient needs to continue stroke risk factor modification.   The patient needs a PT, OT, social service, rehab and speech therapy  consult.  The patient needs DVT prophylaxis.  The patient needs neurochecks as per protocol.  I will send the note to Dr. Salazar.  Thank you very much for sending me this very interesting consultation.  I discussed all these issues in detail with the patient and answered all their questions.  I will continue to follow the patient while they are in the hospital.  The patient needs follow-up with their primary care doctor within 2 weeks of discharge.  On discharge, the patient will follow up with me on an as-needed basis.      Yonatan Hall MD

## 2024-06-25 NOTE — PROGRESS NOTES
Darly Win is a 34 y.o. male on day 0 of admission presenting with Pain in both lower extremities.      Subjective   Patient seen and examined at bedside this morning, he still complaining of some pain radiating down from his knees.  States that it is tolerable.    Objective     Last Recorded Vitals  /79   Pulse 99   Temp 36.9 °C (98.4 °F)   Resp 16   Wt 83.9 kg (185 lb)   SpO2 90%   Intake/Output last 3 Shifts:    Intake/Output Summary (Last 24 hours) at 6/25/2024 1657  Last data filed at 6/25/2024 1254  Gross per 24 hour   Intake 400 ml   Output --   Net 400 ml       Admission Weight  Weight: 83.9 kg (185 lb) (06/24/24 1616)    Daily Weight  06/24/24 : 83.9 kg (185 lb)    Physical Exam  Constitutional: Awake, alert, NAD anicteric sclera  ENMT: mucous membranes moist, EOMI, conjunctivae clear  Head/Neck: normocephalic, atraumatic; supple, trachea midline  Respiratory/Thorax: patent airways, CTAB; no wheezes, rales, or rhonchi  Cardiovascular: RRR, no murmur  Gastrointestinal: Soft, nondistended, multiple laparoscopic incisions noted, no erythema or tenderness surrounding them  Extremities: palpable peripheral pulses, no edema or cyanosis  Neurological: No lower extremity weakness, sphincter tone intact  Psychological: appropriate mood and behavior  Skin: warm and dry    Assessment/Plan   Daryl Win is a 34 y.o. male with history of grade 4 hiatal hernia status post paraesophageal hernia repair on 6/10/2024 presented to the ED on 6/24 with concerns of fever, lower extremity pain and weakness.     Acute:  #Lower extremity weakness  #Fever of unknown origin  -Patient complaining of difficulty ambulating since 6/24, states that he has also been having shooting pains from his hips down into his feet.  We will obtain an MRI of the head, lumbar region.  IR has been consulted consulted for a lumbar puncture which is scheduled for tomorrow.  We will obtain CSF studies.    #Intra-abdominal fluid  collection  -Expected postop per general surgery, no concern for infection or abscess    Diet: Regular  Consults: Neurology, neurosurgery, interventional radiology  CODE STATUS: Full code    Best Jara DO PGY-1  Internal Medicine

## 2024-06-25 NOTE — PROGRESS NOTES
Occupational Therapy                 Therapy Communication Note    Patient Name: Daryl Win  MRN: [id number]  Today's Date: 6/25/2024     Discipline: Occupational Therapy    Missed Visit Reason: Awaiting neurology consult before proceeding with therapy evaluation.

## 2024-06-25 NOTE — PROGRESS NOTES
Physical Therapy                 Therapy Communication Note    Patient Name: Daryl Win  MRN: [id number]  Today's Date: 6/25/2024     Discipline: Physical Therapy    Missed Visit Reason: Missed Visit Reason: Patient placed on medical hold    Missed Time: Cancel    Comment:  Waiting on neurology consult before PT eval.

## 2024-06-25 NOTE — PROGRESS NOTES
Daryl iWn is a 34 y.o. male on day 0 of admission presenting with Pain in both lower extremities.    Subjective    Patient denies chest pain, SOB, urinary symptoms, or dysphagia. He is quite thirsty this morning.    Still endorsing pain in both legs. States the pain is from his hips down. Shooting pains either from hips to ankles, or knees to ankles. 4/10 currently, but after walking to and from bathroom just across the hallway he says the pain went up to 6/10. Feels quite weak.        Objective     Physical Exam  Constitutional:       General: He is not in acute distress.     Appearance: Normal appearance. He is normal weight. He is not toxic-appearing.   HENT:      Head: Normocephalic.      Nose: Nose normal.      Mouth/Throat:      Mouth: Mucous membranes are moist.   Cardiovascular:      Rate and Rhythm: Normal rate and regular rhythm.      Pulses: Normal pulses.      Heart sounds: No murmur heard.  Pulmonary:      Effort: Pulmonary effort is normal. No respiratory distress.      Breath sounds: Normal breath sounds.   Chest:      Chest wall: No tenderness.   Abdominal:      General: Bowel sounds are normal. There is no distension.      Palpations: Abdomen is soft.      Tenderness: There is no abdominal tenderness.      Comments: Laparoscopic incisions healing well, still with skin glue surrounding- no ecchymosis    Musculoskeletal:         General: Tenderness (BLE tender at hip and knee joints- no effusion noted, no erythema) present.      Cervical back: Normal range of motion and neck supple. No rigidity or tenderness.   Lymphadenopathy:      Cervical: No cervical adenopathy.   Skin:     General: Skin is warm and dry.      Findings: No rash.   Neurological:      Mental Status: He is alert and oriented to person, place, and time. Mental status is at baseline.   Psychiatric:         Behavior: Behavior normal.         Last Recorded Vitals  Blood pressure (!) 142/97, pulse 86, temperature 36.8 °C (98.2 °F),  "temperature source Skin, resp. rate 18, height 1.778 m (5' 10\"), weight 83.9 kg (185 lb), SpO2 98%.  Intake/Output last 3 Shifts:  I/O last 3 completed shifts:  In: 200 (2.4 mL/kg) [IV Piggyback:200]  Out: - (0 mL/kg)   Weight: 83.9 kg     Relevant Results  Results for orders placed or performed during the hospital encounter of 06/24/24 (from the past 24 hour(s))   ECG 12 lead   Result Value Ref Range    Ventricular Rate 112 BPM    Atrial Rate 113 BPM    RI Interval 138 ms    QRS Duration 83 ms    QT Interval 286 ms    QTC Calculation(Bazett) 391 ms    P Axis 42 degrees    R Axis 59 degrees    T Axis 22 degrees    QRS Count 18 beats    Q Onset 249 ms    T Offset 392 ms    QTC Fredericia 352 ms   CBC and Auto Differential   Result Value Ref Range    WBC 5.6 4.4 - 11.3 x10*3/uL    nRBC 0.0 0.0 - 0.0 /100 WBCs    RBC 4.77 4.50 - 5.90 x10*6/uL    Hemoglobin 10.6 (L) 13.5 - 17.5 g/dL    Hematocrit 34.9 (L) 41.0 - 52.0 %    MCV 73 (L) 80 - 100 fL    MCH 22.2 (L) 26.0 - 34.0 pg    MCHC 30.4 (L) 32.0 - 36.0 g/dL    RDW 20.3 (H) 11.5 - 14.5 %    Platelets 328 150 - 450 x10*3/uL    Neutrophils % 83.7 40.0 - 80.0 %    Immature Granulocytes %, Automated 1.1 (H) 0.0 - 0.9 %    Lymphocytes % 4.1 13.0 - 44.0 %    Monocytes % 10.5 2.0 - 10.0 %    Eosinophils % 0.4 0.0 - 6.0 %    Basophils % 0.2 0.0 - 2.0 %    Neutrophils Absolute 4.72 1.20 - 7.70 x10*3/uL    Immature Granulocytes Absolute, Automated 0.06 0.00 - 0.70 x10*3/uL    Lymphocytes Absolute 0.23 (L) 1.20 - 4.80 x10*3/uL    Monocytes Absolute 0.59 0.10 - 1.00 x10*3/uL    Eosinophils Absolute 0.02 0.00 - 0.70 x10*3/uL    Basophils Absolute 0.01 0.00 - 0.10 x10*3/uL   Comprehensive metabolic panel   Result Value Ref Range    Glucose 108 (H) 74 - 99 mg/dL    Sodium 137 136 - 145 mmol/L    Potassium 3.8 3.5 - 5.3 mmol/L    Chloride 102 98 - 107 mmol/L    Bicarbonate 25 21 - 32 mmol/L    Anion Gap 14 10 - 20 mmol/L    Urea Nitrogen 13 6 - 23 mg/dL    Creatinine 0.81 0.50 - 1.30 " mg/dL    eGFR >90 >60 mL/min/1.73m*2    Calcium 9.5 8.6 - 10.3 mg/dL    Albumin 4.0 3.4 - 5.0 g/dL    Alkaline Phosphatase 55 33 - 120 U/L    Total Protein 6.6 6.4 - 8.2 g/dL    AST 20 9 - 39 U/L    Bilirubin, Total 0.4 0.0 - 1.2 mg/dL    ALT 43 10 - 52 U/L   Sars-CoV-2 PCR   Result Value Ref Range    Coronavirus 2019, PCR Not Detected Not Detected   Influenza A, and B PCR   Result Value Ref Range    Flu A Result Not Detected Not Detected    Flu B Result Not Detected Not Detected   Morphology   Result Value Ref Range    RBC Morphology See Below     Polychromasia Mild     Hypochromia Mild     Target Cells Few     Ovalocytes Few     Teardrop Cells Few    Creatine Kinase   Result Value Ref Range    Creatine Kinase 34 0 - 325 U/L   Urinalysis with Reflex Culture and Microscopic   Result Value Ref Range    Color, Urine Yellow Straw, Yellow    Appearance, Urine Clear Clear    Specific Gravity, Urine 1.010 1.005 - 1.035    pH, Urine 7.0 5.0, 5.5, 6.0, 6.5, 7.0, 7.5, 8.0    Protein, Urine 10 (TRACE) NEGATIVE, 10 (TRACE) mg/dL    Glucose, Urine NEGATIVE NEGATIVE mg/dL    Blood, Urine NEGATIVE NEGATIVE    Ketones, Urine 5 (TRACE) (A) NEGATIVE mg/dL    Bilirubin, Urine NEGATIVE NEGATIVE    Urobilinogen, Urine NORM NORM mg/dL    Nitrite, Urine NEGATIVE NEGATIVE    Leukocyte Esterase, Urine NEGATIVE NEGATIVE   Extra Urine Gray Tube   Result Value Ref Range    Extra Tube Hold for add-ons.    Urinalysis Microscopic   Result Value Ref Range    WBC, Urine NONE 1-5, NONE /HPF    RBC, Urine NONE NONE, 1-2, 3-5 /HPF    Squamous Epithelial Cells, Urine 1-9 (SPARSE) Reference range not established. /HPF   Blood Culture    Specimen: Peripheral Venipuncture; Blood culture   Result Value Ref Range    Blood Culture Loaded on Instrument - Culture in progress    Blood Culture    Specimen: Peripheral Venipuncture; Blood culture   Result Value Ref Range    Blood Culture Loaded on Instrument - Culture in progress    CBC   Result Value Ref Range     WBC 3.8 (L) 4.4 - 11.3 x10*3/uL    nRBC 0.0 0.0 - 0.0 /100 WBCs    RBC 4.02 (L) 4.50 - 5.90 x10*6/uL    Hemoglobin 9.0 (L) 13.5 - 17.5 g/dL    Hematocrit 29.6 (L) 41.0 - 52.0 %    MCV 74 (L) 80 - 100 fL    MCH 22.4 (L) 26.0 - 34.0 pg    MCHC 30.4 (L) 32.0 - 36.0 g/dL    RDW 20.1 (H) 11.5 - 14.5 %    Platelets 243 150 - 450 x10*3/uL   Renal Function Panel   Result Value Ref Range    Glucose 87 74 - 99 mg/dL    Sodium 135 (L) 136 - 145 mmol/L    Potassium 3.9 3.5 - 5.3 mmol/L    Chloride 102 98 - 107 mmol/L    Bicarbonate 24 21 - 32 mmol/L    Anion Gap 13 10 - 20 mmol/L    Urea Nitrogen 13 6 - 23 mg/dL    Creatinine 0.84 0.50 - 1.30 mg/dL    eGFR >90 >60 mL/min/1.73m*2    Calcium 8.5 (L) 8.6 - 10.3 mg/dL    Phosphorus 4.7 2.5 - 4.9 mg/dL    Albumin 3.4 3.4 - 5.0 g/dL   Magnesium   Result Value Ref Range    Magnesium 2.28 1.60 - 2.40 mg/dL         Assessment/Plan   Principal Problem:    Pain in both lower extremities    Patient is a 34-year-old male with a history of asthma, tachycardia, GERD, and grade 4 hiatal hernia s/p paraesophageal hernia repair with Dr. Negron on 6/10/2024 who presents to the ED on 6/24 with complaints of high fever, lower extremity pain, and weakness.  Patient was febrile up to 39.5, tachycardic, and hypertensive.  He has no leukocytosis.  CT of the abdomen and pelvis demonstrates recent fundoplication surgery with moderate fluid in the location that surrounds the GE junction and upper stomach; there is no abnormal air within the fluid; there is no free air intra-abdominally.  Patient's abdomen is non tender.  Given his benign abdominal exam and lack of leukocytosis, perforation or intra-abdominal abscess are not likely.     Impression:  #Fever of unknown origin - likely viral in nature   #Lower extremity pain and weakness  #Intra-abdominal fluid collection - expected postoperative finding; no concern for infection or abscess at this time      Recommendations:  -okay to stop IV  Zosyn/fluconazole   -start CLD today and ADAT   -check vitals/temp q4h  -PRN anti-pyretics   -remainder of care/workup per primary medicine team     The patient has yet to be seen or discussed with the attending surgeon Dr. Negron. Please await attending attestation.    I spent 20 minutes in the professional and overall care of this patient.      Frances Lorenzana, APRN-CNP

## 2024-06-25 NOTE — PROGRESS NOTES
06/25/24 1516   Discharge Planning   Living Arrangements Parent   Support Systems Family members   Assistance Needed PTA - reports baseline is independent.   Type of Residence Private residence   Number of Stairs to Enter Residence 2   Number of Stairs Within Residence 13   Do you have animals or pets at home? No   Home or Post Acute Services None   Patient expects to be discharged to: HOME   Does the patient need discharge transport arranged? No     Pt lives with his parents and reports he is independent for all needs. He works and drives. PT/OT ordered and eval is on hold until Neuro sees the pt regarding new difficulty ambulating and BLE pain.

## 2024-06-25 NOTE — CONSULTS
Reason For Consult  Recent paraesophageal hernia, admitted with fever and lower extremity pain.    History Of Present Illness  Daryl Win is a 34 y.o. male with history of asthma, tachycardia, GERD, and grade 4 hiatal hernia s/p paraesophageal hernia repair with Dr. Negron on 6/10/2024 who presents to the ED today with complaints of high fever, lower extremity pain and weakness.  Patient states that he woke up yesterday morning feeling fine until about 11:30 AM when he began experiencing shooting pain from the hips down bilateral legs to the ankles.  He characterizes the pain as sometimes cramping, burning, or electric.  Pain was intermittent at first and relieved with Tylenol, however this was short-lived as the pain returned with consistency and increased severity.  Yesterday afternoon he noted fever of 102 with associated chills.  Patient denies any other flulike symptoms such as cough, congestion, runny nose, shortness of breath, chest pain, nausea, vomiting, diarrhea, or urinary symptoms.  Patient denies experiencing these type of symptoms before.  Of note, patient's mother states that when he was younger he had a virus that caused aching in his knees and ankles.  He states his postop recovery was going well - he denies any abdominal pain and states he was tolerating his postop diet without nausea, vomiting or bloating.    In ED patient is found to be febrile with a temp of 39.5, tachycardic in the low 100s and hypertensive as high as 178/113.  Labs reviewed and are grossly unremarkable.  There is no leukocytosis, immature granulocytes 1.1, lymphocyte absolute 0.23.  Hemoglobin is 10.6.  Flu a, flu B and COVID test are negative.  Chest x-ray negative.  CT of the abdomen and pelvis demonstrates recent fundoplication surgery with moderate fluid in the location that surrounds the GE junction and upper stomach, there is no abnormal air within the fluid..     Past Medical History  He has a past medical  "history of Asthma ([facility]), GERD (gastroesophageal reflux disease), and Tachycardia (05/22/2024).    Surgical History  He has a past surgical history that includes [place] tooth extraction.     Social History  He reports that he has never smoked. He has never used smokeless tobacco. He reports current alcohol use of about 3.0 standard drinks of alcohol per week. He reports that he does not use drugs.    Family History  Family History   Problem Relation Name Age of Onset    Stomach cancer Father          Allergies  Patient has no known allergies.    Review of Systems  A ten-point review of systems was completed and is otherwise negative except for what is mentioned in the HPI above.     Physical Exam  Physical Exam:    GENERAL: Well developed, awake/alert/oriented x3, no distress, alert and cooperative  HEENT: AT/NC, EOMI, MMM  NECK: Trachea midline  CARDIOVASCULAR: RRR, mild tachycardia, normal S1 and S 2, no murmurs, 2+ equal pulses of the extremities  RESPIRATORY: Patent airways, CTAB, normal breath sounds with good chest expansion, thorax symmetric  ABDOMEN: Soft, minimal incisional tenderness, normal bowel sounds, minimally distended.  Lap sites well-approximated and healing well without surrounding erythema or drainage.  SKIN: Warm and dry, no lesions, no rashes  EXTREMITIES: No cyanosis, edema  NEURO: A&O x 3, normal motor and sensation, no focal deficits.  Patient does report weakness in bilateral lower extremities with episodes of pain but none noted now.  PSYCH: Appropriate mood and behavior       Last Recorded Vitals  Blood pressure (!) 141/93, pulse (!) 110, temperature (!) 39.5 °C (103.1 °F), temperature source Skin, resp. rate 18, height 1.778 m (5' 10\"), weight 83.9 kg (185 lb), SpO2 97%.    Relevant Results  CT abdomen pelvis w IV contrast    Result Date: 6/24/2024  STUDY: CT Abdomen and Pelvis with IV Contrast; 06/24/2024 at 7:10 PM INDICATION: Recent abdominal surgery (hernia). Fever. COMPARISON: " None available. ACCESSION NUMBER(S): [id number] ORDERING CLINICIAN: ROCIO REEDER TECHNIQUE: CT of the abdomen and pelvis was performed.  Contiguous axial images were obtained at 3 mm slice thickness through the abdomen and pelvis. Coronal and sagittal reconstructions at 3 mm slice thickness were performed.  Omnipaque-350 75 mL was administered intravenously.  FINDINGS: Abdomen: By history there has been recent hernia surgery, apparently at the GE junction/upper stomach. Configuration suggests fundoplication. Moderate fluid in this location surrounds the GE junction and upper stomach, and extends superiorly into the middle mediastinum. There is no abnormal air within the fluid. The mid and distal stomach is minimally distended with mottled gastric contents. There is minimal air and fluid distention in the small bowel without thickening or obstruction. Appendix is normal. Moderate stool burden is seen throughout the colon. There is trace ascites. There is no free air. Liver, spleen, pancreas, adrenal glands, and biliary system are unremarkable. Kidneys are normal except for a tiny cyst on the left. There are no renal calculi and there is no hydronephrosis. There is no significant lymphadenopathy in the abdomen or retroperitoneum. Abdominal aorta and IVC are normal caliber and patent. Pelvis: Additional minimal ascites collects dependently. The bladder is normally distended. The deep pelvic veins are patent. There are no inguinal hernias. Skeletal: Bony structures are intact. Lumbar spine alignment is anatomic. Lower chest: There are minimal pleural effusions, with adjacent focal atelectasis. Heart is normal size.    1. Apparent recent surgery at the GE junction/upper stomach, with configuration suggesting fundoplication. 2. Moderate fluid in this location surrounds the GE junction and upper stomach, and extends superiorly into the middle mediastinum. There is no abnormal air within the fluid. 3. Remaining bowel  is remarkable for minimal air and fluid distention in the small bowel. There is no free air. 4. Trace ascites. 5. Remainder as above. Signed by Noe Neri MD    XR chest 2 views    Result Date: 6/24/2024  Interpreted By:  Yonatan Covarrubias, STUDY: XR CHEST 2 VIEWS;  6/24/2024 4:33 pm   INDICATION: Signs/Symptoms:fever.   COMPARISON: 01/27/2023   ACCESSION NUMBER(S): [id number]   ORDERING CLINICIAN: ROCIO REEDER   FINDINGS: PA and lateral radiographs of the chest were provided.   CARDIOMEDIASTINAL SILHOUETTE: Cardiomediastinal silhouette is stable in size and configuration. Hiatal hernia is again apparent.   LUNGS: Lungs are clear.   ABDOMEN: No remarkable upper abdominal findings.   BONES: No acute osseous changes.       1.  No evidence of acute cardiopulmonary process.       MACRO: None   Signed by: Yonatan Covarrubias 6/24/2024 4:36 PM Dictation workstation:   [id number]   Results for orders placed or performed during the hospital encounter of 06/24/24 (from the past 24 hour(s))   CBC and Auto Differential   Result Value Ref Range    WBC 5.6 4.4 - 11.3 x10*3/uL    nRBC 0.0 0.0 - 0.0 /100 WBCs    RBC 4.77 4.50 - 5.90 x10*6/uL    Hemoglobin 10.6 (L) 13.5 - 17.5 g/dL    Hematocrit 34.9 (L) 41.0 - 52.0 %    MCV 73 (L) 80 - 100 fL    MCH 22.2 (L) 26.0 - 34.0 pg    MCHC 30.4 (L) 32.0 - 36.0 g/dL    RDW 20.3 (H) 11.5 - 14.5 %    Platelets 328 150 - 450 x10*3/uL    Neutrophils % 83.7 40.0 - 80.0 %    Immature Granulocytes %, Automated 1.1 (H) 0.0 - 0.9 %    Lymphocytes % 4.1 13.0 - 44.0 %    Monocytes % 10.5 2.0 - 10.0 %    Eosinophils % 0.4 0.0 - 6.0 %    Basophils % 0.2 0.0 - 2.0 %    Neutrophils Absolute 4.72 1.20 - 7.70 x10*3/uL    Immature Granulocytes Absolute, Automated 0.06 0.00 - 0.70 x10*3/uL    Lymphocytes Absolute 0.23 (L) 1.20 - 4.80 x10*3/uL    Monocytes Absolute 0.59 0.10 - 1.00 x10*3/uL    Eosinophils Absolute 0.02 0.00 - 0.70 x10*3/uL    Basophils Absolute 0.01 0.00 - 0.10 x10*3/uL   Comprehensive  metabolic panel   Result Value Ref Range    Glucose 108 (H) 74 - 99 mg/dL    Sodium 137 136 - 145 mmol/L    Potassium 3.8 3.5 - 5.3 mmol/L    Chloride 102 98 - 107 mmol/L    Bicarbonate 25 21 - 32 mmol/L    Anion Gap 14 10 - 20 mmol/L    Urea Nitrogen 13 6 - 23 mg/dL    Creatinine 0.81 0.50 - 1.30 mg/dL    eGFR >90 >60 mL/min/1.73m*2    Calcium 9.5 8.6 - 10.3 mg/dL    Albumin 4.0 3.4 - 5.0 g/dL    Alkaline Phosphatase 55 33 - 120 U/L    Total Protein 6.6 6.4 - 8.2 g/dL    AST 20 9 - 39 U/L    Bilirubin, Total 0.4 0.0 - 1.2 mg/dL    ALT 43 10 - 52 U/L   Sars-CoV-2 PCR   Result Value Ref Range    Coronavirus 2019, PCR Not Detected Not Detected   Influenza A, and B PCR   Result Value Ref Range    Flu A Result Not Detected Not Detected    Flu B Result Not Detected Not Detected   Morphology   Result Value Ref Range    RBC Morphology See Below     Polychromasia Mild     Hypochromia Mild     Target Cells Few     Ovalocytes Few     Teardrop Cells Few         Assessment/Plan     Patient is a 34-year-old male with history of asthma, tachycardia, GERD, and grade 4 hiatal hernia s/p paraesophageal hernia repair with Dr. Negron on 6/10/2024 who presents to the ED today with complaints of high fever, lower extremity pain and weakness.  Patient is febrile up to 39.5, tachycardic and hypertensive.  He has no leukocytosis.  CT of the abdomen and pelvis demonstrates recent fundoplication surgery with moderate fluid in the location that surrounds the GE junction and upper stomach; there is no abnormal air within the fluid; there is no free air intra-abdominally.  Patient's abdomen is non tender.  Given his benign abdominal exam and lack of leukocytosis, perforation or intra-abdominal abscess are not likely.    Impression:  #Fever of unknown origin  #Lower extremity pain and weakness  #Intra-abdominal fluid collection - likely spectated postoperative finding, less likely to be related to perforation or  abscess    Recommendations:  -Continue IV Zosyn and fluconazole for now  -Keep n.p.o. tonight in the event that the patient would need intervention tomorrow  -Maintenance IV fluids  -Further management per primary medicine team    Patient was seen and discussed with Dr. Wallace, plan as above.  Patient will be seen by attending surgeon Dr. Negron tomorrow    I spent 30 minutes in the professional and overall care of this patient.      Tona Hartman, TUYET-CNP

## 2024-06-26 ENCOUNTER — APPOINTMENT (OUTPATIENT)
Dept: RADIOLOGY | Facility: HOSPITAL | Age: 34
End: 2024-06-26
Payer: COMMERCIAL

## 2024-06-26 LAB
ANION GAP SERPL CALC-SCNC: 11 MMOL/L (ref 10–20)
BUN SERPL-MCNC: 12 MG/DL (ref 6–23)
CALCIUM SERPL-MCNC: 8.5 MG/DL (ref 8.6–10.3)
CHLORIDE SERPL-SCNC: 101 MMOL/L (ref 98–107)
CO2 SERPL-SCNC: 26 MMOL/L (ref 21–32)
CREAT SERPL-MCNC: 0.85 MG/DL (ref 0.5–1.3)
CRP SERPL-MCNC: 9.24 MG/DL
EGFRCR SERPLBLD CKD-EPI 2021: >90 ML/MIN/1.73M*2
ERYTHROCYTE [DISTWIDTH] IN BLOOD BY AUTOMATED COUNT: 20 % (ref 11.5–14.5)
ERYTHROCYTE [SEDIMENTATION RATE] IN BLOOD BY WESTERGREN METHOD: 35 MM/H (ref 0–15)
GLUCOSE SERPL-MCNC: 108 MG/DL (ref 74–99)
HCT VFR BLD AUTO: 31.3 % (ref 41–52)
HGB BLD-MCNC: 9.2 G/DL (ref 13.5–17.5)
MCH RBC QN AUTO: 21.8 PG (ref 26–34)
MCHC RBC AUTO-ENTMCNC: 29.4 G/DL (ref 32–36)
MCV RBC AUTO: 74 FL (ref 80–100)
NRBC BLD-RTO: 0 /100 WBCS (ref 0–0)
PLATELET # BLD AUTO: 280 X10*3/UL (ref 150–450)
POTASSIUM SERPL-SCNC: 3.8 MMOL/L (ref 3.5–5.3)
RBC # BLD AUTO: 4.22 X10*6/UL (ref 4.5–5.9)
SODIUM SERPL-SCNC: 134 MMOL/L (ref 136–145)
TSH SERPL-ACNC: 4.08 MIU/L (ref 0.44–3.98)
WBC # BLD AUTO: 4.4 X10*3/UL (ref 4.4–11.3)

## 2024-06-26 PROCEDURE — 2500000001 HC RX 250 WO HCPCS SELF ADMINISTERED DRUGS (ALT 637 FOR MEDICARE OP)

## 2024-06-26 PROCEDURE — 36415 COLL VENOUS BLD VENIPUNCTURE: CPT

## 2024-06-26 PROCEDURE — 82607 VITAMIN B-12: CPT | Mod: PARLAB

## 2024-06-26 PROCEDURE — 72156 MRI NECK SPINE W/O & W/DYE: CPT | Performed by: RADIOLOGY

## 2024-06-26 PROCEDURE — 86780 TREPONEMA PALLIDUM: CPT | Mod: PARLAB

## 2024-06-26 PROCEDURE — 2550000001 HC RX 255 CONTRASTS: Performed by: STUDENT IN AN ORGANIZED HEALTH CARE EDUCATION/TRAINING PROGRAM

## 2024-06-26 PROCEDURE — 72156 MRI NECK SPINE W/O & W/DYE: CPT

## 2024-06-26 PROCEDURE — 2500000004 HC RX 250 GENERAL PHARMACY W/ HCPCS (ALT 636 FOR OP/ED)

## 2024-06-26 PROCEDURE — A9575 INJ GADOTERATE MEGLUMI 0.1ML: HCPCS | Performed by: STUDENT IN AN ORGANIZED HEALTH CARE EDUCATION/TRAINING PROGRAM

## 2024-06-26 PROCEDURE — 86038 ANTINUCLEAR ANTIBODIES: CPT | Mod: PARLAB

## 2024-06-26 PROCEDURE — 97161 PT EVAL LOW COMPLEX 20 MIN: CPT | Mod: GP

## 2024-06-26 PROCEDURE — 72157 MRI CHEST SPINE W/O & W/DYE: CPT | Performed by: RADIOLOGY

## 2024-06-26 PROCEDURE — 97165 OT EVAL LOW COMPLEX 30 MIN: CPT | Mod: GO

## 2024-06-26 PROCEDURE — 99221 1ST HOSP IP/OBS SF/LOW 40: CPT | Performed by: NURSE PRACTITIONER

## 2024-06-26 PROCEDURE — 99233 SBSQ HOSP IP/OBS HIGH 50: CPT

## 2024-06-26 PROCEDURE — 99233 SBSQ HOSP IP/OBS HIGH 50: CPT | Performed by: PSYCHIATRY & NEUROLOGY

## 2024-06-26 PROCEDURE — 85652 RBC SED RATE AUTOMATED: CPT

## 2024-06-26 PROCEDURE — 86140 C-REACTIVE PROTEIN: CPT

## 2024-06-26 PROCEDURE — 80048 BASIC METABOLIC PNL TOTAL CA: CPT

## 2024-06-26 PROCEDURE — 97530 THERAPEUTIC ACTIVITIES: CPT | Mod: GP

## 2024-06-26 PROCEDURE — 62328 DX LMBR SPI PNXR W/FLUOR/CT: CPT | Mod: 52

## 2024-06-26 PROCEDURE — 72157 MRI CHEST SPINE W/O & W/DYE: CPT

## 2024-06-26 PROCEDURE — 87491 CHLMYD TRACH DNA AMP PROBE: CPT | Mod: PARLAB

## 2024-06-26 PROCEDURE — 00JU3ZZ INSPECTION OF SPINAL CANAL, PERCUTANEOUS APPROACH: ICD-10-PCS | Performed by: STUDENT IN AN ORGANIZED HEALTH CARE EDUCATION/TRAINING PROGRAM

## 2024-06-26 PROCEDURE — 84443 ASSAY THYROID STIM HORMONE: CPT

## 2024-06-26 PROCEDURE — 87591 N.GONORRHOEAE DNA AMP PROB: CPT | Mod: PARLAB

## 2024-06-26 PROCEDURE — 85027 COMPLETE CBC AUTOMATED: CPT

## 2024-06-26 PROCEDURE — 1200000002 HC GENERAL ROOM WITH TELEMETRY DAILY

## 2024-06-26 RX ORDER — GADOTERATE MEGLUMINE 376.9 MG/ML
16 INJECTION INTRAVENOUS
Status: COMPLETED | OUTPATIENT
Start: 2024-06-26 | End: 2024-06-26

## 2024-06-26 ASSESSMENT — COGNITIVE AND FUNCTIONAL STATUS - GENERAL
HELP NEEDED FOR BATHING: A LITTLE
DAILY ACTIVITIY SCORE: 23
CLIMB 3 TO 5 STEPS WITH RAILING: A LITTLE
WALKING IN HOSPITAL ROOM: A LITTLE
MOBILITY SCORE: 22

## 2024-06-26 ASSESSMENT — PAIN - FUNCTIONAL ASSESSMENT
PAIN_FUNCTIONAL_ASSESSMENT: 0-10

## 2024-06-26 ASSESSMENT — PAIN SCALES - GENERAL
PAINLEVEL_OUTOF10: 8
PAINLEVEL_OUTOF10: 2
PAINLEVEL_OUTOF10: 4
PAINLEVEL_OUTOF10: 7
PAINLEVEL_OUTOF10: 8
PAINLEVEL_OUTOF10: 4
PAINLEVEL_OUTOF10: 6

## 2024-06-26 ASSESSMENT — PAIN DESCRIPTION - DESCRIPTORS
DESCRIPTORS: THROBBING
DESCRIPTORS: THROBBING;STABBING

## 2024-06-26 ASSESSMENT — PAIN DESCRIPTION - LOCATION: LOCATION: KNEE

## 2024-06-26 ASSESSMENT — ACTIVITIES OF DAILY LIVING (ADL)
BATHING_ASSISTANCE: STAND BY
ADL_ASSISTANCE: INDEPENDENT

## 2024-06-26 NOTE — PRE-PROCEDURE NOTE
Interventional Radiology Preprocedure Note    Indication for procedure: The primary encounter diagnosis was Pain in both lower extremities. A diagnosis of Postoperative fever was also pertinent to this visit.    Relevant Labs:   Lab Results   Component Value Date    CREATININE 0.85 06/26/2024    EGFR >90 06/26/2024    INR 1.2 (H) 11/25/2023    PROTIME 13.1 (H) 11/25/2023       Planned Sedation/Anesthesia: Local Anesthesia     Benefits, risks and alternatives of procedure and planned sedation have been discussed with the patient and/or their representative. All questions answered and they agree to proceed.     Site marked: Yes, L3-4, L4-5, L5-S1 levels    Daryl Win is appropriate candidate for Lumbar Puncture

## 2024-06-26 NOTE — PROGRESS NOTES
Occupational Therapy    Occupational Therapy    Evaluation    Patient Name: Daryl Win  MRN: [id number]  Today's Date: 6/26/2024  Time Calculation  Start Time: 1200  Stop Time: 1225  Time Calculation (min): 25 min  629/629-A    Assessment  IP OT Assessment  End of Session Patient Position: Bed, 2 rail up, Alarm off, not on at start of session (call light in reach. all needs met)    Plan:  No Skilled OT: No acute OT goals identified  Equipment Recommended upon Discharge:  (shower chair for tub for safety)  OT - OK to Discharge: Yes (once cleared by medical team)    Subjective     Current Problem:  1. Pain in both lower extremities        2. Postoperative fever            General:  General  Reason for Referral: OT eval and treat for adls- Pt. admitted with fever, acute LE pain, weakness with radiating pain down ble's. MRI brain=small central protrusion L4-5.  Referred By: Dr. REESE Mendez  Past Medical History Relevant to Rehab: hiatal hernia, s/p repair on 6/10/24, asthma, gerd  Family/Caregiver Present: No  Co-Treatment: PT  Co-Treatment Reason: to maximize pt. safety and mobility  Prior to Session Communication: Bedside nurse  Patient Position Received: Bed, 2 rail up, Alarm off, not on at start of session  General Comment: pt. pleasant and cooperative    Precautions:  Medical Precautions: Fall precautions    Pain:  Pain Assessment  0-10 (Numeric) Pain Score: 4 (both knees; had pain meds.)    Objective     Cognition:  Overall Cognitive Status: Within Functional Limits     Home Living:  Type of Home: House  Lives With:  (with mother who works PT)  Home Adaptive Equipment: Cane  Home Layout: Multi-level, 1/2 bath on main level (pt.'s bedroom is in basement. 12 steps with railing. 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 pt. and pt's mother drives.)    ADL:  Eating Assistance: Independent  Grooming Assistance: Independent  Bathing Assistance: Stand by  UE Dressing Assistance: Independent  LE Dressing Assistance: Independent (ind. donning/doffing socks in bed)  Toileting Assistance with Device: Modified independent    Bed Mobility/Transfers:   Bed Mobility  Bed Mobility:  (ind. supine<>sit at eob)  Transfers  Transfer:  (sit<>stand independent)    Ambulation/Gait Training:  Functional Mobility  Functional Mobility Performed:  (sba with ambulating in hallway. pt. has been walking to and from bathroom by himself without device. pt. up/down steps using railing with cga. increased pain and legs tremulous.)    Sitting Balance:  Static Sitting Balance  Static Sitting-Level of Assistance: Independent    Standing Balance:  Static Standing Balance  Static Standing-Level of Assistance: Independent    Sensation:  Light Touch: No apparent deficits    Strength:  Strength Comments: bue arom wfl. bue strength 4/5    Coordination:  Movements are Fluid and Coordinated: Yes     Hand Function:  Hand Function  Coordination: Functional    Outcome Measures: [facility] Daily Activity  Putting on and taking off regular lower body clothing: None  Bathing (including washing, rinsing, drying): A little  Putting on and taking off regular upper body clothing: None  Toileting, which includes using toilet, bedpan or urinal: None  Taking care of personal grooming such as brushing teeth: None  Eating Meals: None  Daily Activity - Total Score: 23     EDUCATION:       Goals: N/A

## 2024-06-26 NOTE — CONSULTS
"Reason For Consult  Sudden onset b/l LE weakness waist down w/\"electric shock\" pain and sudden constipation, concern for cauda equina    History Of Present Illness  Daryl Win is a 34 y.o. male presenting from home to [facility] ED on 06/24/2024, with fevers, acute onset bilateral lower extremity weakness with shooting pain from hips to ankles. Symptoms included cramping, burning, \"electric\"; he had not had a BM in approximately one week. He denies bowel / bladder incontinence, saddle anesthesia, falls / trauma, imbalance, paresthesia. Doppler US of BLE is negative for DVT. CT Abdomen / Pelvis reveals expected post operative changes. MRI L Spine images reviewed - no etiology of symptoms noted. MRI Brain completed with no acute findings as well. Neurology team consulted and MRI C and T Spine have been requested.     Currently, he feels back to baseline. He is able to relate history of events easily. He reports that \"electric shocks\" traveled down entire legs, not in any specific dermatome, and started at mid ilium level. Symptoms were not relieved with position change, pressure. Symptoms were initially moderate, then became severe which brought him to the ED. Denies dropping items, difficulty dressing, opening items. He denies these symptoms in the past.    Of note, he underwent repair of paraesophageal hernia on 06/10/2024, with no immediate post op complications. He was discharged home on 06/11/2024, and felt well until the day of this admission.       Past Medical History  He has a past medical history of Asthma ([facility]), GERD (gastroesophageal reflux disease), and Tachycardia (05/22/2024). GI Bleed 11/25/2023    Surgical History  He has a past surgical history that includes [place] tooth extraction.     Social History  He reports that he has never smoked. He has never used smokeless tobacco. He reports current alcohol use of about 3.0 standard drinks of alcohol per week. He reports that he does not use drugs. " "He lives at home with his mother, no pets. Works at home on a computer or laptop. Also works part time at his brothers [employer] business.     Family History  Family History   Problem Relation Name Age of Onset    Stomach cancer Father          Allergies  Patient has no known allergies.    Medications:  Scheduled medications  melatonin, 5 mg, oral, Nightly  polyethylene glycol, 17 g, oral, Daily      Continuous medications     PRN medications  PRN medications: acetaminophen, oxyCODONE, oxyCODONE      Review of Systems  ROS x 10 is, otherwise, negative unless documented above in HPI     Physical Exam  Well nourished, well developed male, resting in bed  A&O x 4, speech clear / fluent  Skin is warm / dry, no obvious lesions on exposed skin   Thorax is midline; chest expansion is symmetric  OCASIO readily in bed, strength is 5 / 5 BUE / BLE  SILT  DTRs are 1+ throughout, Rickie's absent, no ankle clonus  Babinski is upgoing bilaterally  Abdomen is not distended  Urine - no incontinence       Last Recorded Vitals  Blood pressure 128/82, pulse 92, temperature 36.3 °C (97.3 °F), resp. rate 18, height 1.778 m (5' 10\"), weight 83.9 kg (185 lb), SpO2 95%.    Relevant Results  Results for orders placed or performed during the hospital encounter of 06/24/24 (from the past 24 hour(s))   CBC   Result Value Ref Range    WBC 3.8 (L) 4.4 - 11.3 x10*3/uL    nRBC 0.0 0.0 - 0.0 /100 WBCs    RBC 4.02 (L) 4.50 - 5.90 x10*6/uL    Hemoglobin 9.0 (L) 13.5 - 17.5 g/dL    Hematocrit 29.6 (L) 41.0 - 52.0 %    MCV 74 (L) 80 - 100 fL    MCH 22.4 (L) 26.0 - 34.0 pg    MCHC 30.4 (L) 32.0 - 36.0 g/dL    RDW 20.1 (H) 11.5 - 14.5 %    Platelets 243 150 - 450 x10*3/uL   Renal Function Panel   Result Value Ref Range    Glucose 87 74 - 99 mg/dL    Sodium 135 (L) 136 - 145 mmol/L    Potassium 3.9 3.5 - 5.3 mmol/L    Chloride 102 98 - 107 mmol/L    Bicarbonate 24 21 - 32 mmol/L    Anion Gap 13 10 - 20 mmol/L    Urea Nitrogen 13 6 - 23 mg/dL    Creatinine " 0.84 0.50 - 1.30 mg/dL    eGFR >90 >60 mL/min/1.73m*2    Calcium 8.5 (L) 8.6 - 10.3 mg/dL    Phosphorus 4.7 2.5 - 4.9 mg/dL    Albumin 3.4 3.4 - 5.0 g/dL   Magnesium   Result Value Ref Range    Magnesium 2.28 1.60 - 2.40 mg/dL     MRI L SPINE WITH / WITHOUT CONTRAST on 06/25/2024: Images reviewed (see HPI)  IMPRESSION:  Small central disc protrusion at L4-5 does not significantly narrow  the central canal or neuroforamina.   Signed by: Narda Quigley     Assessment/Plan   33 yo male with fever, acute onset of BLE weakness, now resolved.  Discussed with Dr. Scotty Sherman:  - Await MRI C, T Spine as requested by Neurology  - Continue medical management    I spent > 35 minutes in the professional and overall care of this patient.      Wanda Nair, APRN-CNP

## 2024-06-26 NOTE — PROGRESS NOTES
Physical Therapy    Physical Therapy Evaluation    Patient Name: Daryl Win  MRN: [id number]  Today's Date: 6/26/2024   Time Calculation  Start Time: 1201  Stop Time: 1225  Time Calculation (min): 24 min  629/629-A    Assessment/Plan   PT Assessment  End of Session Communication: Bedside nurse  End of Session Patient Position: Bed, 2 rail up, Alarm off, not on at start of session (All needs in reach and no complaints noted)  IP OR SWING BED PT PLAN  Inpatient or Swing Bed: Inpatient  PT Plan  PT Plan: PT Eval only  PT Eval Only Reason: No acute PT needs identified  PT Frequency: PT eval only  PT Discharge Recommendations: Low intensity level of continued care (vs OP PT)  PT - OK to Discharge: Yes (once cleared by medical team)    Subjective     Current Problem:  1. Pain in both lower extremities        2. Postoperative fever          Patient Active Problem List   Diagnosis    Anemia    Iron deficiency anemia due to chronic blood loss    Peptic ulcer with hemorrhage    Pain in both lower extremities     General Visit Information:  General  Reason for Referral: PT Eval and Treat  Referred By: Mike Kapoor MD  Past Medical History Relevant to Rehab: 34 y.o. male with history of grade 4 hiatal hernia status post paraesophageal hernia repair on 6/10/2024 presented to the ED on 6/24 with concerns of fever, lower extremity pain and weakness.  Postop, patient has been healing well, been able to advance his diet from liquids to few solid foods, additionally has been ambulating without difficulty.  Yesterday morning, patient suddenly began having shooting pains down bilateral hips, down to his legs and ankles, making it difficult for him to ambulate.  He describes it as an unsteadiness that he has on his feet, with associated cramping, making it incredibly uncomfortable for him to walk.  Prior to Session Communication: Bedside nurse  Patient Position Received: Bed, 2 rail up, Alarm off, not on at start of session  (Agreeable to PT)    Home Living:  Home Living  Home Living Comments: Pt lives with his mom in a multi level house with 1 ANDRZEJ and full flight to basement where pt's bedroom is. Main level has 1/2 bath with standard toilet and upstairs has full bathroom with tub/shower and standard toilet.    Prior Level of Function:  Prior Function Per Pt/Caregiver Report  Level of [place]: Independent with homemaking with ambulation, Independent with ADLs and functional transfers (Pt amb without an AD, (+) drive and working)    Precautions:  Precautions  Precautions Comment: Fall precautions    Objective     Pain:  Pain Assessment  Pain Assessment:  (4/10 B LEs at rest, increased pain with transfers and stair navigation, however did not quantify. Repositioned pt for comfort s/p session)    Cognition:  Cognition  Overall Cognitive Status: Within Functional Limits    General Assessments:  Sensation  Light Touch: No apparent deficits  Strength  Strength Comments: B LE ROM and strength WFL  Dynamic Standing Balance  Dynamic Standing-Comments: Good without an AD    Functional Assessments:  Bed Mobility  Bed Mobility:  (supine <> sitting EOB: independent)  Transfers  Transfer:  (STS from EOB: independent)  Ambulation/Gait Training  Ambulation/Gait Training Performed:  (Pt was able to amb 50' x 2 without an AD with good mechanics and balance.)  Stairs  Stairs:  (Pt ascended/descended 10 stairs using unilateral HR with CGA to SBA due to becoming slightly shaky when pain began to increase.)     Outcome Measures:  [facility] Basic Mobility  Turning from your back to your side while in a flat bed without using bedrails: None  Moving from lying on your back to sitting on the side of a flat bed without using bedrails: None  Moving to and from bed to chair (including a wheelchair): None  Standing up from a chair using your arms (e.g. wheelchair or bedside chair): None  To walk in hospital room: A little  Climbing 3-5 steps with railing: A  little  Basic Mobility - Total Score: 22    Education Documentation  No documentation found.  Education Comments  No comments found.

## 2024-06-26 NOTE — PROGRESS NOTES
"Daryl Win is a 34 y.o. male on day 1 of admission presenting with Pain in both lower extremities.    Subjective   Patient denies chest pain, SOB, abdomina pain, or N/V. He is doing well with a regular diet. He still endorses ongoing bilateral leg pain, but states it is improved from admission. He is scheduled for an LP this afternoon. He had MRI C/T-spine this AM, pending results. MRI brain and L-spine yesterday were negative.        Objective     Physical Exam  Constitutional:       General: He is not in acute distress.     Appearance: Normal appearance. He is not ill-appearing or toxic-appearing.   HENT:      Nose: Nose normal.      Mouth/Throat:      Mouth: Mucous membranes are moist.   Pulmonary:      Effort: Pulmonary effort is normal. No respiratory distress.   Abdominal:      General: There is no distension.      Palpations: Abdomen is soft.      Tenderness: There is no abdominal tenderness.      Comments: Laparoscopic incisions healing well, still with skin glue surrounding- no ecchymosis    Musculoskeletal:         General: Tenderness (BLE tender at hip and knee joints- no effusion noted, no erythema) present.      Cervical back: Normal range of motion.   Skin:     General: Skin is warm and dry.      Findings: No rash.   Neurological:      Mental Status: He is alert and oriented to person, place, and time. Mental status is at baseline.   Psychiatric:         Mood and Affect: Mood normal.         Behavior: Behavior normal.         Last Recorded Vitals  Blood pressure 133/89, pulse 105, temperature 36.7 °C (98.1 °F), resp. rate 18, height 1.778 m (5' 10\"), weight 83.9 kg (185 lb), SpO2 98%.  Intake/Output last 3 Shifts:  I/O last 3 completed shifts:  In: 400 (4.8 mL/kg) [IV Piggyback:400]  Out: - (0 mL/kg)   Weight: 83.9 kg     Relevant Results  Results for orders placed or performed during the hospital encounter of 06/24/24 (from the past 24 hour(s))   CBC   Result Value Ref Range    WBC 4.4 4.4 - " 11.3 x10*3/uL    nRBC 0.0 0.0 - 0.0 /100 WBCs    RBC 4.22 (L) 4.50 - 5.90 x10*6/uL    Hemoglobin 9.2 (L) 13.5 - 17.5 g/dL    Hematocrit 31.3 (L) 41.0 - 52.0 %    MCV 74 (L) 80 - 100 fL    MCH 21.8 (L) 26.0 - 34.0 pg    MCHC 29.4 (L) 32.0 - 36.0 g/dL    RDW 20.0 (H) 11.5 - 14.5 %    Platelets 280 150 - 450 x10*3/uL   Basic Metabolic Panel   Result Value Ref Range    Glucose 108 (H) 74 - 99 mg/dL    Sodium 134 (L) 136 - 145 mmol/L    Potassium 3.8 3.5 - 5.3 mmol/L    Chloride 101 98 - 107 mmol/L    Bicarbonate 26 21 - 32 mmol/L    Anion Gap 11 10 - 20 mmol/L    Urea Nitrogen 12 6 - 23 mg/dL    Creatinine 0.85 0.50 - 1.30 mg/dL    eGFR >90 >60 mL/min/1.73m*2    Calcium 8.5 (L) 8.6 - 10.3 mg/dL   Sedimentation rate, automated   Result Value Ref Range    Sedimentation Rate 35 (H) 0 - 15 mm/h   C-reactive protein   Result Value Ref Range    C-Reactive Protein 9.24 (H) <1.00 mg/dL   TSH   Result Value Ref Range    Thyroid Stimulating Hormone 4.08 (H) 0.44 - 3.98 mIU/L         Assessment/Plan   Principal Problem:    Pain in both lower extremities    Patient is a 34-year-old male with a history of asthma, tachycardia, GERD, and grade 4 hiatal hernia s/p paraesophageal hernia repair with Dr. Negron on 6/10/2024 who presents to the ED on 6/24 with complaints of high fever, lower extremity pain, and weakness.  Patient was febrile up to 39.5, tachycardic, and hypertensive.  He has no leukocytosis.  CT of the abdomen and pelvis demonstrates recent fundoplication surgery with moderate fluid in the location that surrounds the GE junction and upper stomach; there is no abnormal air within the fluid; there is no free air intra-abdominally.  Patient's abdomen is non tender.  Given his benign abdominal exam and lack of leukocytosis, perforation or intra-abdominal abscess are not likely.     Impression:  #Fever of unknown origin - likely viral in nature   #Lower extremity pain and weakness  #Intra-abdominal fluid collection - expected  postoperative finding; no concern for infection or abscess at this time      Recommendations:  - Continue regular diet as tolerated  - No need for abx from general surgery standpoint  - Remainder of care/workup per primary medicine team     General surgery will follow peripherally.    Patient was discussed with attending surgeon, Dr. Negron.    Myesha Lott PA-C

## 2024-06-26 NOTE — PROGRESS NOTES
"Daryl Win is a 34 y.o. male on day 1 of admission presenting with Pain in both lower extremities.      Subjective      The patient is a 34-year-old male who presents with lower extremity weakness.  The patient had an hiatal hernia repair on 6/10/2024 and did very well.  He states that on the morning of admission he had the onset of shooting pains from his hips down to his legs bilaterally.  He states that the pain is resolved.  During the time he had pain he had severe weakness in his lower extremities.  Currently the patient denies any pain and feels back to his baseline.  He denies any new neurological problems.    Objective     Last Recorded Vitals  Blood pressure 142/81, pulse (!) 111, temperature 36.8 °C (98.2 °F), temperature source Temporal, resp. rate 20, height 1.778 m (5' 10\"), weight 83.9 kg (185 lb), SpO2 91%.    Physical Exam  Neurological Exam    The patient's mental status testing shows that the patient is alert and oriented ×3 with no evidence of any aphasia or dysarthria.  The patient's cranial nerve testing 2, 3, 4, 5, 6, and 7 are all within normal limits.  The patient's motor testing shows normal tone, bulk and power in the upper and lower extremities bilaterally.        Relevant Results              [place] Coma Scale  Best Eye Response: Spontaneous  Best Verbal Response: Oriented  Best Motor Response: Follows commands  [place] Coma Scale Score: 15           Scheduled medications  melatonin, 5 mg, oral, Nightly  polyethylene glycol, 17 g, oral, Daily      Continuous medications     PRN medications  PRN medications: acetaminophen, oxyCODONE, oxyCODONE    Results for orders placed or performed during the hospital encounter of 06/24/24 (from the past 96 hour(s))   ECG 12 lead   Result Value Ref Range    Ventricular Rate 112 BPM    Atrial Rate 113 BPM    OK Interval 138 ms    QRS Duration 83 ms    QT Interval 286 ms    QTC Calculation(Bazett) 391 ms    P Axis 42 degrees    R Axis 59 degrees "    T Axis 22 degrees    QRS Count 18 beats    Q Onset 249 ms    T Offset 392 ms    QTC Fredericia 352 ms   CBC and Auto Differential   Result Value Ref Range    WBC 5.6 4.4 - 11.3 x10*3/uL    nRBC 0.0 0.0 - 0.0 /100 WBCs    RBC 4.77 4.50 - 5.90 x10*6/uL    Hemoglobin 10.6 (L) 13.5 - 17.5 g/dL    Hematocrit 34.9 (L) 41.0 - 52.0 %    MCV 73 (L) 80 - 100 fL    MCH 22.2 (L) 26.0 - 34.0 pg    MCHC 30.4 (L) 32.0 - 36.0 g/dL    RDW 20.3 (H) 11.5 - 14.5 %    Platelets 328 150 - 450 x10*3/uL    Neutrophils % 83.7 40.0 - 80.0 %    Immature Granulocytes %, Automated 1.1 (H) 0.0 - 0.9 %    Lymphocytes % 4.1 13.0 - 44.0 %    Monocytes % 10.5 2.0 - 10.0 %    Eosinophils % 0.4 0.0 - 6.0 %    Basophils % 0.2 0.0 - 2.0 %    Neutrophils Absolute 4.72 1.20 - 7.70 x10*3/uL    Immature Granulocytes Absolute, Automated 0.06 0.00 - 0.70 x10*3/uL    Lymphocytes Absolute 0.23 (L) 1.20 - 4.80 x10*3/uL    Monocytes Absolute 0.59 0.10 - 1.00 x10*3/uL    Eosinophils Absolute 0.02 0.00 - 0.70 x10*3/uL    Basophils Absolute 0.01 0.00 - 0.10 x10*3/uL   Comprehensive metabolic panel   Result Value Ref Range    Glucose 108 (H) 74 - 99 mg/dL    Sodium 137 136 - 145 mmol/L    Potassium 3.8 3.5 - 5.3 mmol/L    Chloride 102 98 - 107 mmol/L    Bicarbonate 25 21 - 32 mmol/L    Anion Gap 14 10 - 20 mmol/L    Urea Nitrogen 13 6 - 23 mg/dL    Creatinine 0.81 0.50 - 1.30 mg/dL    eGFR >90 >60 mL/min/1.73m*2    Calcium 9.5 8.6 - 10.3 mg/dL    Albumin 4.0 3.4 - 5.0 g/dL    Alkaline Phosphatase 55 33 - 120 U/L    Total Protein 6.6 6.4 - 8.2 g/dL    AST 20 9 - 39 U/L    Bilirubin, Total 0.4 0.0 - 1.2 mg/dL    ALT 43 10 - 52 U/L   Sars-CoV-2 PCR   Result Value Ref Range    Coronavirus 2019, PCR Not Detected Not Detected   Influenza A, and B PCR   Result Value Ref Range    Flu A Result Not Detected Not Detected    Flu B Result Not Detected Not Detected   Morphology   Result Value Ref Range    RBC Morphology See Below     Polychromasia Mild     Hypochromia Mild      Target Cells Few     Ovalocytes Few     Teardrop Cells Few    Creatine Kinase   Result Value Ref Range    Creatine Kinase 34 0 - 325 U/L   Urinalysis with Reflex Culture and Microscopic   Result Value Ref Range    Color, Urine Yellow Straw, Yellow    Appearance, Urine Clear Clear    Specific Gravity, Urine 1.010 1.005 - 1.035    pH, Urine 7.0 5.0, 5.5, 6.0, 6.5, 7.0, 7.5, 8.0    Protein, Urine 10 (TRACE) NEGATIVE, 10 (TRACE) mg/dL    Glucose, Urine NEGATIVE NEGATIVE mg/dL    Blood, Urine NEGATIVE NEGATIVE    Ketones, Urine 5 (TRACE) (A) NEGATIVE mg/dL    Bilirubin, Urine NEGATIVE NEGATIVE    Urobilinogen, Urine NORM NORM mg/dL    Nitrite, Urine NEGATIVE NEGATIVE    Leukocyte Esterase, Urine NEGATIVE NEGATIVE   Extra Urine Gray Tube   Result Value Ref Range    Extra Tube Hold for add-ons.    Urinalysis Microscopic   Result Value Ref Range    WBC, Urine NONE 1-5, NONE /HPF    RBC, Urine NONE NONE, 1-2, 3-5 /HPF    Squamous Epithelial Cells, Urine 1-9 (SPARSE) Reference range not established. /HPF   Blood Culture    Specimen: Peripheral Venipuncture; Blood culture   Result Value Ref Range    Blood Culture No growth at 1 day    Blood Culture    Specimen: Peripheral Venipuncture; Blood culture   Result Value Ref Range    Blood Culture No growth at 1 day    CBC   Result Value Ref Range    WBC 3.8 (L) 4.4 - 11.3 x10*3/uL    nRBC 0.0 0.0 - 0.0 /100 WBCs    RBC 4.02 (L) 4.50 - 5.90 x10*6/uL    Hemoglobin 9.0 (L) 13.5 - 17.5 g/dL    Hematocrit 29.6 (L) 41.0 - 52.0 %    MCV 74 (L) 80 - 100 fL    MCH 22.4 (L) 26.0 - 34.0 pg    MCHC 30.4 (L) 32.0 - 36.0 g/dL    RDW 20.1 (H) 11.5 - 14.5 %    Platelets 243 150 - 450 x10*3/uL   Renal Function Panel   Result Value Ref Range    Glucose 87 74 - 99 mg/dL    Sodium 135 (L) 136 - 145 mmol/L    Potassium 3.9 3.5 - 5.3 mmol/L    Chloride 102 98 - 107 mmol/L    Bicarbonate 24 21 - 32 mmol/L    Anion Gap 13 10 - 20 mmol/L    Urea Nitrogen 13 6 - 23 mg/dL    Creatinine 0.84 0.50 - 1.30 mg/dL     eGFR >90 >60 mL/min/1.73m*2    Calcium 8.5 (L) 8.6 - 10.3 mg/dL    Phosphorus 4.7 2.5 - 4.9 mg/dL    Albumin 3.4 3.4 - 5.0 g/dL   Magnesium   Result Value Ref Range    Magnesium 2.28 1.60 - 2.40 mg/dL   CBC   Result Value Ref Range    WBC 4.4 4.4 - 11.3 x10*3/uL    nRBC 0.0 0.0 - 0.0 /100 WBCs    RBC 4.22 (L) 4.50 - 5.90 x10*6/uL    Hemoglobin 9.2 (L) 13.5 - 17.5 g/dL    Hematocrit 31.3 (L) 41.0 - 52.0 %    MCV 74 (L) 80 - 100 fL    MCH 21.8 (L) 26.0 - 34.0 pg    MCHC 29.4 (L) 32.0 - 36.0 g/dL    RDW 20.0 (H) 11.5 - 14.5 %    Platelets 280 150 - 450 x10*3/uL   Basic Metabolic Panel   Result Value Ref Range    Glucose 108 (H) 74 - 99 mg/dL    Sodium 134 (L) 136 - 145 mmol/L    Potassium 3.8 3.5 - 5.3 mmol/L    Chloride 101 98 - 107 mmol/L    Bicarbonate 26 21 - 32 mmol/L    Anion Gap 11 10 - 20 mmol/L    Urea Nitrogen 12 6 - 23 mg/dL    Creatinine 0.85 0.50 - 1.30 mg/dL    eGFR >90 >60 mL/min/1.73m*2    Calcium 8.5 (L) 8.6 - 10.3 mg/dL   Sedimentation rate, automated   Result Value Ref Range    Sedimentation Rate 35 (H) 0 - 15 mm/h   C-reactive protein   Result Value Ref Range    C-Reactive Protein 9.24 (H) <1.00 mg/dL   TSH   Result Value Ref Range    Thyroid Stimulating Hormone 4.08 (H) 0.44 - 3.98 mIU/L     I did review the images of the MRI of the cervical spine and thoracic spine.      FL guided lumbar puncture    Result Date: 6/26/2024  Interpreted By:  Victor Hugo Lopez, STUDY: FL GUIDED LUMBAR PUNCTURE; 6/26/2024 3:00 pm   INDICATION: Signs/Symptoms:Lumbar Puncture, rule out meningitis.   COMPARISON: None.   ACCESSION NUMBER(S): [id number]   ORDERING CLINICIAN: AUNDREA MENDEZ   TECHNIQUE: Fluoroscopy guided lumbar puncture Images: 5   FINDINGS: Informed consent obtained. Patient position prone. Skin marked, prepped, draped and anesthetized.   Under fluoroscopy guidance, spinal needle advanced toward the spinal canal via interlaminar approach at multiple levels L3-L4, L4-5 and L5-S1 through multiple  attempts. Unfortunately after multiple attempts no CSF could be aspirated and the procedure was aborted.       1. Under fluoroscopy guidance, spinal needle advanced toward the spinal canal via interlaminar approach at multiple levels L3-L4, L4-5 and L5-S1 through multiple attempts. Unfortunately no CSF could be aspirated and the procedure was aborted.   The results of this procedure were discussed with Sony Morales MD on 06/06/2024 at 3 p.m. through [employer] secure chat.   Signed by: Victor Hugo Lopez 6/26/2024 4:21 PM Dictation workstation:   [id number]    MR thoracic spine w and wo IV contrast    Result Date: 6/26/2024  Interpreted By:  Mike Dangelo, STUDY: MR THORACIC SPINE W AND WO IV CONTRAST; MR CERVICAL SPINE W AND WO IV CONTRAST;  6/26/2024 11:15 am   INDICATION: Signs/Symptoms:The patient has lower extremity weakness.. Signs/Symptoms: The patient has lower extremity weakness.   COMPARISON: None.   ACCESSION NUMBER(S): [id number]; [id number]   ORDERING CLINICIAN: MATTHIEU CISNEROS   TECHNIQUE:   Sagittal STIR, sagittal T2, sagittal T1, axial T2, axial T1, as well as post gadolinium sagittal axial T1 weighted MRI images through the cervical and thoracic spine were obtained. The patient received 16 mL of Dotarem gadolinium intravenously.   FINDINGS: The study is mildly degraded by motion.   The thoracic and lumbar vertebral bodies are in anatomic alignment.   No focal bone marrow signal abnormality is noted.   The visualized spinal cord demonstrates no definite signal abnormality or abnormal enhancement within it.   No abnormal epidural fluid collection or epidural soft tissue mass is noted within the cervical or thoracic region.   At the C2/3 level,  there is no significant spinal canal stenosis or neuroforaminal stenosis.   At the C3/4 level,  there is no significant spinal canal stenosis or neuroforaminal stenosis.   At the C4/5 level,  there is a minimal posterior disc/osteophyte complex without  significant spinal canal or neural foraminal narrowing.   At the C5/6 level,  there is a minimal posterior disc/osteophyte complex without significant spinal canal or neural foraminal narrowing.   At the C6/7 level,  there is no significant spinal canal stenosis or neuroforaminal stenosis.   At the C7/T1 level,  there is no significant spinal canal stenosis or neuroforaminal stenosis.   At the T1/2 level,  there is no significant spinal canal stenosis or neural foraminal stenosis.   At the T2/3 level, there is no significant spinal canal narrowing.   At the T3/4 level, there is a mild posterior disc/osteophyte complex contributing to mild spinal canal narrowing.   At the T4/5 level, there is no significant spinal canal narrowing.   At the T5/6 level, there is no significant spinal canal narrowing.   At the T6/7 level, there is no significant spinal canal narrowing.   At the T7/8 level, there is no significant spinal canal narrowing.   At the T8/9 level, there is no significant spinal canal narrowing.   At the T9/10 level, there is no significant spinal canal narrowing.   At the T10/11 level, there are mild degenerative facet changes without significant spinal canal narrowing.   At the T11/12 level, there is no significant spinal canal narrowing.   At the T12/L1 level, there is no significant spinal canal narrowing or neural foraminal narrowing.   At the L1/2 level, the sagittal images demonstrate no significant spinal canal or neural foraminal narrowing. No axial images were obtained through this level limiting further evaluation.   The limited images through the thorax demonstrate bilateral pleural effusions. There is partially imaged nonspecific suspected abnormal fluid surrounding the distal esophagus. Correlation with dedicated chest/abdominal imaging is recommended.       There is mild spondylosis within the cervical and thoracic region as described above.   The limited images through the thorax demonstrate  bilateral pleural effusions. There is partially imaged nonspecific suspected abnormal fluid surrounding the distal esophagus. Correlation with dedicated chest/abdominal imaging is recommended.   MACRO: None.   Signed by: Mike Dangelo 6/26/2024 3:59 PM Dictation workstation:   [id number]    MR cervical spine w and wo IV contrast    Result Date: 6/26/2024  Interpreted By:  Mike Dangelo, STUDY: MR THORACIC SPINE W AND WO IV CONTRAST; MR CERVICAL SPINE W AND WO IV CONTRAST;  6/26/2024 11:15 am   INDICATION: Signs/Symptoms:The patient has lower extremity weakness.. Signs/Symptoms: The patient has lower extremity weakness.   COMPARISON: None.   ACCESSION NUMBER(S): [id number]; [id number]   ORDERING CLINICIAN: MATTHIEU CISNEROS   TECHNIQUE:   Sagittal STIR, sagittal T2, sagittal T1, axial T2, axial T1, as well as post gadolinium sagittal axial T1 weighted MRI images through the cervical and thoracic spine were obtained. The patient received 16 mL of Dotarem gadolinium intravenously.   FINDINGS: The study is mildly degraded by motion.   The thoracic and lumbar vertebral bodies are in anatomic alignment.   No focal bone marrow signal abnormality is noted.   The visualized spinal cord demonstrates no definite signal abnormality or abnormal enhancement within it.   No abnormal epidural fluid collection or epidural soft tissue mass is noted within the cervical or thoracic region.   At the C2/3 level,  there is no significant spinal canal stenosis or neuroforaminal stenosis.   At the C3/4 level,  there is no significant spinal canal stenosis or neuroforaminal stenosis.   At the C4/5 level,  there is a minimal posterior disc/osteophyte complex without significant spinal canal or neural foraminal narrowing.   At the C5/6 level,  there is a minimal posterior disc/osteophyte complex without significant spinal canal or neural foraminal narrowing.   At the C6/7 level,  there is no significant spinal canal stenosis or  neuroforaminal stenosis.   At the C7/T1 level,  there is no significant spinal canal stenosis or neuroforaminal stenosis.   At the T1/2 level,  there is no significant spinal canal stenosis or neural foraminal stenosis.   At the T2/3 level, there is no significant spinal canal narrowing.   At the T3/4 level, there is a mild posterior disc/osteophyte complex contributing to mild spinal canal narrowing.   At the T4/5 level, there is no significant spinal canal narrowing.   At the T5/6 level, there is no significant spinal canal narrowing.   At the T6/7 level, there is no significant spinal canal narrowing.   At the T7/8 level, there is no significant spinal canal narrowing.   At the T8/9 level, there is no significant spinal canal narrowing.   At the T9/10 level, there is no significant spinal canal narrowing.   At the T10/11 level, there are mild degenerative facet changes without significant spinal canal narrowing.   At the T11/12 level, there is no significant spinal canal narrowing.   At the T12/L1 level, there is no significant spinal canal narrowing or neural foraminal narrowing.   At the L1/2 level, the sagittal images demonstrate no significant spinal canal or neural foraminal narrowing. No axial images were obtained through this level limiting further evaluation.   The limited images through the thorax demonstrate bilateral pleural effusions. There is partially imaged nonspecific suspected abnormal fluid surrounding the distal esophagus. Correlation with dedicated chest/abdominal imaging is recommended.       There is mild spondylosis within the cervical and thoracic region as described above.   The limited images through the thorax demonstrate bilateral pleural effusions. There is partially imaged nonspecific suspected abnormal fluid surrounding the distal esophagus. Correlation with dedicated chest/abdominal imaging is recommended.   MACRO: None.   Signed by: Mike Dangelo 6/26/2024 3:59 PM Dictation  workstation:   [id number]    MR lumbar spine w and wo IV contrast    Result Date: 6/25/2024  Interpreted By:  Narda Quigley, STUDY: MR LUMBAR SPINE W AND WO IV CONTRAST;  6/25/2024 11:42 am   INDICATION: Signs/Symptoms:pt w sudden onset b/l LE weakness and no bowel movements concern for cord compression.   COMPARISON: None.   ACCESSION NUMBER(S): [id number]   ORDERING CLINICIAN: AUNDREA MENDEZ   TECHNIQUE: Sagittal T1, T2, STIR, axial T1 and T2 weighted images of the lumbar spine were acquired. Axial and sagittal post-contrast T1 weighted images were obtained following administration of 16 mL of Dotarem.   FINDINGS: Alignment: There is minimal, grade 1 retrolisthesis of L4 on L5. There is partial sacralization of L5.   Vertebrae/Intervertebral Discs: The vertebral bodies demonstrate expected height.  The marrow signal is within normal limits. There is mild disc desiccation at L4-5.   Conus: The lower thoracic cord appears unremarkable. The conus terminates at L1. There is no abnormal enhancement of the conus or nerve roots of the cauda equina..   T12-L1:  There is no significant central canal or neural foraminal stenosis. L1-2:  There is no significant central canal or neural foraminal stenosis. L2-3:  There is no significant central canal or neural foraminal stenosis. L3-4:  Minimal disc bulging and degenerative facet arthropathy do not narrow the central canal or neuroforamina. L4-5: There is mild degenerate facet arthropathy and ligamentum flavum hypertrophy, right greater than left. Small central disc protrusion is also noted with minimal impression on the ventral thecal sac. There is no significant central canal or neural foraminal stenosis. L5-S1:  There is no significant central canal or neural foraminal stenosis.   The prevertebral and posterior paraspinous soft tissues are unremarkable.       Small central disc protrusion at L4-5 does not significantly narrow the central canal or neuroforamina.    MACRO: None   Signed by: Narda Quigley 6/25/2024 11:49 AM Dictation workstation:   [id number]    MR brain w and wo IV contrast    Result Date: 6/25/2024  Interpreted By:  Narda Quigley, STUDY: MR BRAIN W AND WO IV CONTRAST;  6/25/2024 11:42 am   INDICATION: Signs/Symptoms:Acute LE weakness with unclear cause, no meningal signs currently.   COMPARISON: None.   ACCESSION NUMBER(S): [id number]   ORDERING CLINICIAN: AUNDREA MENDEZ   TECHNIQUE: Axial T2, FLAIR, DWI, gradient echo T2 and  T1 weighted images of brain were acquired. Post contrast T1 weighted images were acquired after administration of 16 mL Dotarem gadolinium based intravenous contrast.   FINDINGS: CSF Spaces: There is prominence of ventricles and sulci for the patient's given age compatible with parenchymal volume loss.   Parenchyma: There is no diffusion restriction abnormality to suggest acute infarct.  There are minimal hyperintensities on FLAIR imaging in the white matter. There is no abnormal parenchymal enhancement. There is no mass effect or midline shift.   Paranasal Sinuses and Mastoids: Retention cysts or polyps are noted within the maxillary sinuses. There is mild mucosal thickening within scattered paranasal sinuses and opacification of a few inferior mastoid air cells on the right.   Increased signal within the clivus on diffusion-weighted imaging is nonspecific and could be due to artifact as there is no definite corresponding signal abnormality on T1 weighted imaging.       1. Mild parenchymal volume loss for the patient's given age. 2. No evidence of acute ischemic injury or enhancing intracranial mass. Very minimal white-matter changes are nonspecific and may be seen with chronic hypertension, migraine headaches, among others.       MACRO: None   Signed by: Narda Quigley 6/25/2024 11:46 AM Dictation workstation:   [id number]    Vascular US lower extremity venous duplex bilateral    Result Date: 6/24/2024  Interpreted By:   Ciara Monahan, STUDY: [facility] [place] US LOWER EXTREMITY VENOUS DUPLEX BILATERAL;  6/24/2024 9:59 pm   INDICATION: Signs/Symptoms:Admitting physician requested, patient reporting leg pain, recent surgery.   COMPARISON: None.   ACCESSION NUMBER(S): [id number]   ORDERING CLINICIAN: ROCIO REEDER   TECHNIQUE: Grayscale, color and spectral Doppler sonographic images of the bilateral lower extremity deep venous system.   FINDINGS: RIGHT: There is normal compressibility of the common femoral vein, saphenous femoral junction, femoral vein and popliteal vein. The posterior tibial and peroneal veins demonstrate normal color flow and compressibility. There is normal spontaneous and phasic variation throughout the leg by spectral doppler.   LEFT: There is normal compressibility of the common femoral vein, saphenous femoral junction, femoral vein and popliteal vein. The posterior tibial and peroneal veins demonstrate normal color flow and compressibility. There is normal spontaneous and phasic variation throughout the leg by spectral doppler.   OTHER FINDINGS: None.       No sonographic evidence DVT in the bilateral lower extremities.       MACRO: None   Signed by: Ciara Monahan 6/24/2024 10:42 PM Dictation workstation:   [id number]    CT abdomen pelvis w IV contrast    Result Date: 6/24/2024  STUDY: CT Abdomen and Pelvis with IV Contrast; 06/24/2024 at 7:10 PM INDICATION: Recent abdominal surgery (hernia). Fever. COMPARISON: None available. ACCESSION NUMBER(S): [id number] ORDERING CLINICIAN: ROCIO REEDER TECHNIQUE: CT of the abdomen and pelvis was performed.  Contiguous axial images were obtained at 3 mm slice thickness through the abdomen and pelvis. Coronal and sagittal reconstructions at 3 mm slice thickness were performed.  Omnipaque-350 75 mL was administered intravenously.  FINDINGS: Abdomen: By history there has been recent hernia surgery, apparently at the GE junction/upper stomach. Configuration suggests  fundoplication. Moderate fluid in this location surrounds the GE junction and upper stomach, and extends superiorly into the middle mediastinum. There is no abnormal air within the fluid. The mid and distal stomach is minimally distended with mottled gastric contents. There is minimal air and fluid distention in the small bowel without thickening or obstruction. Appendix is normal. Moderate stool burden is seen throughout the colon. There is trace ascites. There is no free air. Liver, spleen, pancreas, adrenal glands, and biliary system are unremarkable. Kidneys are normal except for a tiny cyst on the left. There are no renal calculi and there is no hydronephrosis. There is no significant lymphadenopathy in the abdomen or retroperitoneum. Abdominal aorta and IVC are normal caliber and patent. Pelvis: Additional minimal ascites collects dependently. The bladder is normally distended. The deep pelvic veins are patent. There are no inguinal hernias. Skeletal: Bony structures are intact. Lumbar spine alignment is anatomic. Lower chest: There are minimal pleural effusions, with adjacent focal atelectasis. Heart is normal size.    1. Apparent recent surgery at the GE junction/upper stomach, with configuration suggesting fundoplication. 2. Moderate fluid in this location surrounds the GE junction and upper stomach, and extends superiorly into the middle mediastinum. There is no abnormal air within the fluid. 3. Remaining bowel is remarkable for minimal air and fluid distention in the small bowel. There is no free air. 4. Trace ascites. 5. Remainder as above. Signed by Noe Neri MD          Assessment/Plan      The patient is doing very well from a neurological standpoint and is back to his baseline.  The patient has no weakness and his reflexes are normal.  There is no evidence to suggest Guillain-Barré syndrome at this time.  The patient's MRIs are all essentially within normal limits without any significant  abnormality.  Most likely the patient's weakness was secondary to pain.  No further neurological workup is necessary at this time unless the patient develops new neurological signs or symptoms.  I discussed all these issues in detail with the patient and answered all of his questions.  If the patient has any further neurological problems he can certainly follow-up with me as an outpatient.  I will sign off for now.  Please feel free to call me if you have any further questions regarding the care of this patient.  I did discuss the case with Dr. Morales.      Yonatan Hall MD

## 2024-06-26 NOTE — PROGRESS NOTES
Patient seen and examined. Chart reviewed where relevant. Discussed findings and clinical plan with note author. Full note to follow.    Tolerating diet. No chest or abdominal complaints.  Tachycardia without fever.  CRP 9.24 but WBC normal.  MRI of brain and cervical spine WNL.  Thoracic and lumbar spine MRI pending.  Lumbar puncture pending.  Still with some residual weakness and BL knee pain.  Neurology and NSGY on consult.  Will sign off but will follow electronically.  Patient can followup with me as post op when discharged.    Ashok Negron MD, PhD  Available via [employer]

## 2024-06-26 NOTE — PROGRESS NOTES
Daryl Win is a 34 y.o. male on day 1 of admission presenting with Pain in both lower extremities.      Subjective   Patient seen and examined at bedside this morning, he still complaining of some pain in his knees but mentioned that it is tolerable.    Objective     Last Recorded Vitals  /88   Pulse 103   Temp 36.4 °C (97.5 °F)   Resp 18   Wt 83.9 kg (185 lb)   SpO2 93%   Intake/Output last 3 Shifts:    Intake/Output Summary (Last 24 hours) at 6/26/2024 1058  Last data filed at 6/25/2024 1254  Gross per 24 hour   Intake 100 ml   Output --   Net 100 ml       Admission Weight  Weight: 83.9 kg (185 lb) (06/24/24 1616)    Daily Weight  06/24/24 : 83.9 kg (185 lb)    Physical Exam  Constitutional: Awake, alert, NAD anicteric sclera  ENMT: mucous membranes moist, EOMI, conjunctivae clear  Head/Neck: normocephalic, atraumatic; supple, trachea midline  Respiratory/Thorax: patent airways, CTAB; no wheezes, rales, or rhonchi  Cardiovascular: RRR, no murmur  Gastrointestinal: Soft, nondistended, multiple laparoscopic incisions noted, no erythema or tenderness surrounding them  Extremities: palpable peripheral pulses, no edema or cyanosis  Neurological: Strength intact, sensation intact, sphincter tone intact  Psychological: appropriate mood and behavior  Skin: warm and dry    Assessment/Plan   Daryl Win is a 34 y.o. male with history of grade 4 hiatal hernia status post paraesophageal hernia repair on 6/10/2024 presented to the ED on 6/24 with concerns of fever, lower extremity pain and weakness.     Acute:  #Lower extremity weakness  #Fever of unknown origin  -Patient complaining of difficulty ambulating since 6/24, states that he has also been having shooting pains from his hips down into his feet.  We will obtain an MRI of the head, lumbar region.  Planning for lumbar puncture today with CSF studies.  I will also add on an STI panel, ESR, CRP, autoimmune panel, B12, TSH  -Neurology has been  consulted along with neurosurgery, they are recommending MRI of the cervical and thoracic spine    #Intra-abdominal fluid collection  -Expected postop per general surgery, no concern for infection or abscess    Diet: Regular  Consults: Neurology, neurosurgery, interventional radiology  CODE STATUS: Full code    Best Jara DO PGY-1  Internal Medicine

## 2024-06-26 NOTE — POST-PROCEDURE NOTE
Interventional Radiology Brief Postprocedure Note    Attending: Victor Hugo Lopez    Description of procedure: Under Fluoroscopic guidance and sterile techniques , multiple attempts was performed to access the thecal sac , at L3-4, L4-5 , L5-S1 . The needle was visualized within the  desired space , however after multiple attempts at different levels but unfortunately no CSF could be obtained     Anesthesia:  Local    Complications: None    Estimated Blood Loss: none    Medications  As of 06/26/24 1611      morphine injection 4 mg (mg) Total dose:  Cannot be calculated* Dosing weight:  83.9   *Administration dose not documented     Date/Time Rate/Dose/Volume Action       06/24/24  Canceled Entry               ondansetron (Zofran) injection 4 mg (mg) Total dose:  4 mg Dosing weight:  83.9      Date/Time Rate/Dose/Volume Action       06/24/24 2035 4 mg Given               lactated Ringer's bolus 1,000 mL (mL/hr) Total volume:  Not documented* Dosing weight:  83.9   *Total volume has not been documented. View each administration to see the amount administered.     Date/Time Rate/Dose/Volume Action       06/24/24  1957 1,000 mL - 999 mL/hr (over 60 min) New Bag      2057  (over 60 min) Stopped               acetaminophen (Tylenol) tablet 975 mg (mg) Total dose:  975 mg Dosing weight:  83.9      Date/Time Rate/Dose/Volume Action       06/24/24 2033 975 mg Given               acetaminophen (Tylenol) tablet 650 mg (mg) Total dose:  650 mg Dosing weight:  83.9      Date/Time Rate/Dose/Volume Action       06/25/24  1240 650 mg Given               piperacillin-tazobactam-dextrose (Zosyn) IV 4.5 g (mL/hr) Total volume:  Not documented* Dosing weight:  83.9   *Total volume has not been documented. View each administration to see the amount administered.     Date/Time Rate/Dose/Volume Action       06/24/24 2033 4.5 g - 200 mL/hr (over 30 min) New Bag      2103  (over 30 min) Stopped               piperacillin-tazobactam-dextrose  (Zosyn) IV 4.5 g (mL/hr) Total dose:  9 g* Dosing weight:  83.9   *From user-documented volume     Date/Time Rate/Dose/Volume Action       06/25/24  0550 4.5 g - 200 mL/hr (over 30 min) New Bag      0725 100 mL Stopped      1146 4.5 g - 200 mL/hr (over 30 min) New Bag      1254 100 mL Stopped               HYDROmorphone PF (Dilaudid) injection 1 mg (mg) Total dose:  1 mg Dosing weight:  83.9      Date/Time Rate/Dose/Volume Action       06/24/24  2040 1 mg Given               fluconazole in NaCl (iso-osm) (Diflucan) IVPB 400 mg (mg) Total dose:  800 mg Dosing weight:  83.9      Date/Time Rate/Dose/Volume Action       06/24/24  2323 400 mg New Bag     06/25/24  0310  Stopped      0312 400 mg New Bag      0545 200 mL Stopped               polyethylene glycol (Glycolax, Miralax) packet 17 g (g) Total dose:  34 g Dosing weight:  83.9      Date/Time Rate/Dose/Volume Action       06/25/24  0836 17 g Given     06/26/24  0901 17 g Given               oxyCODONE (Roxicodone) immediate release tablet 5 mg (mg) Total dose:  5 mg Dosing weight:  83.9      Date/Time Rate/Dose/Volume Action       06/25/24  0105 5 mg Given               oxyCODONE (Roxicodone) immediate release tablet 10 mg (mg) Total dose:  30 mg Dosing weight:  83.9      Date/Time Rate/Dose/Volume Action       06/25/24  1928 10 mg Given     06/26/24  0134 10 mg Given      0901 10 mg Given               melatonin tablet 5 mg (mg) Total dose:  10 mg Dosing weight:  83.9      Date/Time Rate/Dose/Volume Action       06/25/24  0107 5 mg Given      2024 5 mg Given               gadoterate meglumine (Dotarem) 0.5 mmol/mL contrast injection 16 mL (mL) Total volume:  32 mL Dosing weight:  83.9      Date/Time Rate/Dose/Volume Action       06/25/24  1118 16 mL Given     06/26/24  1117 16 mL Given                   No specimens collected      See detailed result report with images in PACS.    The patient tolerated the procedure well without incident or complication and is in  stable condition.

## 2024-06-27 ENCOUNTER — PHARMACY VISIT (OUTPATIENT)
Dept: PHARMACY | Facility: CLINIC | Age: 34
End: 2024-06-27
Payer: COMMERCIAL

## 2024-06-27 VITALS
OXYGEN SATURATION: 93 % | SYSTOLIC BLOOD PRESSURE: 137 MMHG | RESPIRATION RATE: 20 BRPM | BODY MASS INDEX: 26.48 KG/M2 | DIASTOLIC BLOOD PRESSURE: 88 MMHG | TEMPERATURE: 96.6 F | WEIGHT: 185 LBS | HEART RATE: 102 BPM | HEIGHT: 70 IN

## 2024-06-27 LAB
ANION GAP SERPL CALC-SCNC: 12 MMOL/L (ref 10–20)
BUN SERPL-MCNC: 9 MG/DL (ref 6–23)
C TRACH RRNA SPEC QL NAA+PROBE: NEGATIVE
CALCIUM SERPL-MCNC: 8.9 MG/DL (ref 8.6–10.3)
CHLORIDE SERPL-SCNC: 101 MMOL/L (ref 98–107)
CO2 SERPL-SCNC: 26 MMOL/L (ref 21–32)
CREAT SERPL-MCNC: 0.84 MG/DL (ref 0.5–1.3)
EGFRCR SERPLBLD CKD-EPI 2021: >90 ML/MIN/1.73M*2
ERYTHROCYTE [DISTWIDTH] IN BLOOD BY AUTOMATED COUNT: 19.9 % (ref 11.5–14.5)
GLUCOSE SERPL-MCNC: 102 MG/DL (ref 74–99)
HCT VFR BLD AUTO: 32 % (ref 41–52)
HGB BLD-MCNC: 9.5 G/DL (ref 13.5–17.5)
MCH RBC QN AUTO: 21.9 PG (ref 26–34)
MCHC RBC AUTO-ENTMCNC: 29.7 G/DL (ref 32–36)
MCV RBC AUTO: 74 FL (ref 80–100)
N GONORRHOEA DNA SPEC QL PROBE+SIG AMP: NEGATIVE
NRBC BLD-RTO: 0 /100 WBCS (ref 0–0)
PLATELET # BLD AUTO: 288 X10*3/UL (ref 150–450)
POTASSIUM SERPL-SCNC: 4.8 MMOL/L (ref 3.5–5.3)
RBC # BLD AUTO: 4.34 X10*6/UL (ref 4.5–5.9)
SODIUM SERPL-SCNC: 134 MMOL/L (ref 136–145)
TREPONEMA PALLIDUM IGG+IGM AB [PRESENCE] IN SERUM OR PLASMA BY IMMUNOASSAY: NONREACTIVE
VIT B12 SERPL-MCNC: 273 PG/ML (ref 211–911)
WBC # BLD AUTO: 4.6 X10*3/UL (ref 4.4–11.3)

## 2024-06-27 PROCEDURE — 2500000004 HC RX 250 GENERAL PHARMACY W/ HCPCS (ALT 636 FOR OP/ED)

## 2024-06-27 PROCEDURE — 2500000001 HC RX 250 WO HCPCS SELF ADMINISTERED DRUGS (ALT 637 FOR MEDICARE OP)

## 2024-06-27 PROCEDURE — 85027 COMPLETE CBC AUTOMATED: CPT

## 2024-06-27 PROCEDURE — RXMED WILLOW AMBULATORY MEDICATION CHARGE

## 2024-06-27 PROCEDURE — 99238 HOSP IP/OBS DSCHRG MGMT 30/<: CPT

## 2024-06-27 PROCEDURE — 82374 ASSAY BLOOD CARBON DIOXIDE: CPT

## 2024-06-27 PROCEDURE — 36415 COLL VENOUS BLD VENIPUNCTURE: CPT

## 2024-06-27 RX ORDER — METHYLPREDNISOLONE 4 MG/1
TABLET ORAL
Qty: 21 TABLET | Refills: 0 | Status: SHIPPED | OUTPATIENT
Start: 2024-06-27 | End: 2024-07-04

## 2024-06-27 RX ORDER — AMOXICILLIN 250 MG
1 CAPSULE ORAL NIGHTLY
Status: DISCONTINUED | OUTPATIENT
Start: 2024-06-27 | End: 2024-06-27 | Stop reason: HOSPADM

## 2024-06-27 RX ORDER — CYCLOBENZAPRINE HCL 5 MG
5 TABLET ORAL 3 TIMES DAILY PRN
Qty: 30 TABLET | Refills: 0 | Status: SHIPPED | OUTPATIENT
Start: 2024-06-27 | End: 2024-07-27

## 2024-06-27 ASSESSMENT — PAIN SCALES - GENERAL
PAINLEVEL_OUTOF10: 5 - MODERATE PAIN
PAINLEVEL_OUTOF10: 5 - MODERATE PAIN
PAINLEVEL_OUTOF10: 6
PAINLEVEL_OUTOF10: 7
PAINLEVEL_OUTOF10: 5 - MODERATE PAIN
PAINLEVEL_OUTOF10: 6

## 2024-06-27 ASSESSMENT — PAIN - FUNCTIONAL ASSESSMENT
PAIN_FUNCTIONAL_ASSESSMENT: 0-10

## 2024-06-27 ASSESSMENT — PAIN DESCRIPTION - DESCRIPTORS
DESCRIPTORS: DISCOMFORT
DESCRIPTORS: THROBBING
DESCRIPTORS: DISCOMFORT
DESCRIPTORS: THROBBING
DESCRIPTORS: DISCOMFORT

## 2024-06-27 NOTE — POST-PROCEDURE NOTE
MRI C and T Spine images reviewed. No significant canal / foraminal narrowing.  BLE weakness is non-neurosurgical  Will sign off.  Wanda Nair, APRN-CNP

## 2024-06-27 NOTE — CARE PLAN
The patient's goals for the shift include      The clinical goals for the shift include safety and comfort    Stool softener added this shift     Problem: Fall/Injury  Goal: Not fall by end of shift  Outcome: Progressing  Goal: Be free from injury by end of the shift  Outcome: Progressing  Goal: Verbalize understanding of personal risk factors for fall in the hospital  Outcome: Progressing  Goal: Verbalize understanding of risk factor reduction measures to prevent injury from fall in the home  Outcome: Progressing  Goal: Use assistive devices by end of the shift  Outcome: Progressing  Goal: Pace activities to prevent fatigue by end of the shift  Outcome: Progressing     Problem: Pain  Goal: Takes deep breaths with improved pain control throughout the shift  Outcome: Progressing  Goal: Turns in bed with improved pain control throughout the shift  Outcome: Progressing  Goal: Walks with improved pain control throughout the shift  Outcome: Progressing  Goal: Performs ADL's with improved pain control throughout shift  Outcome: Progressing  Goal: Participates in PT with improved pain control throughout the shift  Outcome: Progressing  Goal: Free from opioid side effects throughout the shift  Outcome: Progressing  Goal: Free from acute confusion related to pain meds throughout the shift  Outcome: Progressing

## 2024-06-27 NOTE — DISCHARGE INSTRUCTIONS
Attention: You are being discharged with several new medications.  The first medication is a steroid taper to reduce inflammation.  The second medication is a muscle relaxer.  If you continue having pain you may take over-the-counter Tylenol, Aleve, ibuprofen as instructed on the bottle.  Please make sure to follow-up with your primary care physician within 1 week.  I have also provided the contact information for a follow-up appointment to be scheduled with neurology.  Thank you for allowing me to participate in your care.

## 2024-06-27 NOTE — PROGRESS NOTES
Spoke to patient at bedside regarding dispo planning. PT/OT recommendations reviewed with patient. Patient prefers to do Outpatient therapy at discharge. Discussed outpatient script to be given to patient at discharge. Primary resident aware.   VALE JULIO RN TCC

## 2024-06-27 NOTE — DISCHARGE SUMMARY
Discharge diagnosis  Pain in both lower extremities  Lower extremity weakness    Hospital course  Daryl Win is a 34 y.o. male with history of grade 4 hiatal hernia status post paraesophageal hernia repair on 6/10/2024 presented to the ED on 6/24 with concerns of fever, lower extremity pain and weakness.  Evaluation in the emergency department revealed an intra-abdominal fluid collection and a fever.  General surgery was consulted due to his recent hiatal hernia repair, neurosurgery and neurology were consulted due to his sudden onset lower extremity weakness and fever of unknown origin.  Patient underwent MRIs of his spine and brain which revealed a small central disc protrusion at L4-L5 that does not significantly narrow the central canal or neuroforamina.  Lumbar punctures were attempted unsuccessfully.  STD panels and autoimmune panels were ordered to rule out.  During the course of his stay his lower extremity weakness and pain improved significantly.  He was able to walk up and down stairs and across the hallways with no assistance.  Patient was cleared by neurology and discharged home with a Medrol Dosepak, Flexeril and an outpatient referral for physical therapy.  He was also advised to follow-up with his primary care physician within 1 week.    Vitals:    06/27/24 1149   BP: 137/88   Pulse: 102   Resp:    Temp: 35.9 °C (96.6 °F)   SpO2: 93%      Physical exam   GENERAL: Awake/ alert, cooperative.   HEAD:  Normocephalic, atraumatic.  EYES:  Round and reactive.  ENT:  No nasal discharge, mucous membranes moist and pink.  NECK:  Atraumatic, no meningismus.  CARDIOVASCULAR:  Regular rate and rhythm, no murmur.  RESPIRATORY:  CTAB, no active work of breathing.   ABDOMEN:  Soft, no tenderness, nondistended.  EXTREMITIES:  No peripheral edema, no calf tenderness.  SKIN:  Warm and dry.  NEUROLOGICAL:  Nonfocal grossly.     Home-going medications     Medication List      START taking these medications      cyclobenzaprine 5 mg tablet; Commonly known as: Flexeril; Take 1 tablet   (5 mg) by mouth 3 times a day as needed for muscle spasms.   methylPREDNISolone 4 mg tablets; Commonly known as: Medrol Dospak; Take   as directed on package.     CONTINUE taking these medications     melatonin 3 mg tablet   multivitamin tablet   pantoprazole 40 mg EC tablet; Commonly known as: ProtoNix; Take 1 tablet   (40 mg) by mouth 2 times a day before meals. Do not crush, chew, or split.     STOP taking these medications     dexAMETHasone 4 mg tablet; Commonly known as: Decadron   metoclopramide 10 mg tablet; Commonly known as: Reglan   ondansetron ODT 4 mg disintegrating tablet; Commonly known as:   Zofran-ODT   oxyCODONE 5 mg immediate release tablet; Commonly known as: Roxicodone       Outpatient follow-up instructions and medication changes  No future appointments.    Best Jara DO, PGY-1  Internal Medicine

## 2024-06-28 ENCOUNTER — PATIENT OUTREACH (OUTPATIENT)
Dept: CARE COORDINATION | Facility: CLINIC | Age: 34
End: 2024-06-28
Payer: COMMERCIAL

## 2024-06-28 LAB
ANA SER QL HEP2 SUBST: NEGATIVE
ATRIAL RATE: 113 BPM
P AXIS: 42 DEGREES
PR INTERVAL: 138 MS
Q ONSET: 249 MS
QRS COUNT: 18 BEATS
QRS DURATION: 83 MS
QT INTERVAL: 286 MS
QTC CALCULATION(BAZETT): 391 MS
QTC FREDERICIA: 352 MS
R AXIS: 59 DEGREES
T AXIS: 22 DEGREES
T OFFSET: 392 MS
VENTRICULAR RATE: 112 BPM

## 2024-06-28 NOTE — PROGRESS NOTES
Discharge Facility: [facility]  Discharge Diagnosis: Pain and weakness in bilateral lower extremities  Admission Date: 6/24/2024  Discharge Date: 6/27/2024    PCP Appointment Date:  -Unable to schedule d/t no contact; task sent to office to assist with follow up    Hospital Encounter and Summary: Linked     Unable to reach patient x2 attempts, voicemail left with call back number.

## 2024-06-29 LAB
BACTERIA BLD CULT: NORMAL
BACTERIA BLD CULT: NORMAL

## 2024-07-15 ENCOUNTER — PATIENT OUTREACH (OUTPATIENT)
Dept: CARE COORDINATION | Facility: CLINIC | Age: 34
End: 2024-07-15
Payer: COMMERCIAL

## 2024-07-15 NOTE — PROGRESS NOTES
Unable to reach patient for post hospital discharge follow up call.  Left voicemail with call back number for patient to call if needed   If no voicemail available call attempts x 2 were made to contact the patient to assist with any questions or concerns patient may have.

## 2024-11-21 ENCOUNTER — APPOINTMENT (OUTPATIENT)
Dept: PRIMARY CARE | Facility: CLINIC | Age: 34
End: 2024-11-21
Payer: COMMERCIAL

## 2024-11-21 ENCOUNTER — LAB (OUTPATIENT)
Dept: LAB | Facility: LAB | Age: 34
End: 2024-11-21
Payer: COMMERCIAL

## 2024-11-21 VITALS
TEMPERATURE: 98.8 F | BODY MASS INDEX: 27.86 KG/M2 | HEIGHT: 70 IN | SYSTOLIC BLOOD PRESSURE: 136 MMHG | OXYGEN SATURATION: 98 % | RESPIRATION RATE: 20 BRPM | WEIGHT: 194.6 LBS | HEART RATE: 105 BPM | DIASTOLIC BLOOD PRESSURE: 88 MMHG

## 2024-11-21 DIAGNOSIS — A09 DIARRHEA OF INFECTIOUS ORIGIN: ICD-10-CM

## 2024-11-21 DIAGNOSIS — K27.9 PUD (PEPTIC ULCER DISEASE): Primary | ICD-10-CM

## 2024-11-21 DIAGNOSIS — K92.2 UPPER GI BLEED: ICD-10-CM

## 2024-11-21 DIAGNOSIS — K27.9 PUD (PEPTIC ULCER DISEASE): ICD-10-CM

## 2024-11-21 PROCEDURE — 80053 COMPREHEN METABOLIC PANEL: CPT

## 2024-11-21 PROCEDURE — 85027 COMPLETE CBC AUTOMATED: CPT

## 2024-11-21 PROCEDURE — 3008F BODY MASS INDEX DOCD: CPT | Performed by: FAMILY MEDICINE

## 2024-11-21 PROCEDURE — 36415 COLL VENOUS BLD VENIPUNCTURE: CPT

## 2024-11-21 PROCEDURE — 99214 OFFICE O/P EST MOD 30 MIN: CPT | Performed by: FAMILY MEDICINE

## 2024-11-21 PROCEDURE — 85007 BL SMEAR W/DIFF WBC COUNT: CPT

## 2024-11-21 PROCEDURE — 1036F TOBACCO NON-USER: CPT | Performed by: FAMILY MEDICINE

## 2024-11-21 RX ORDER — PANTOPRAZOLE SODIUM 40 MG/1
40 TABLET, DELAYED RELEASE ORAL
Qty: 60 TABLET | Refills: 11 | Status: SHIPPED | OUTPATIENT
Start: 2024-11-21

## 2024-11-21 RX ORDER — SUCRALFATE 1 G/10ML
1 SUSPENSION ORAL 4 TIMES DAILY
Qty: 1200 ML | Refills: 0 | Status: SHIPPED | OUTPATIENT
Start: 2024-11-21

## 2024-11-21 RX ORDER — VANCOMYCIN HYDROCHLORIDE 125 MG/1
125 CAPSULE ORAL 4 TIMES DAILY
Qty: 40 CAPSULE | Refills: 0 | Status: SHIPPED | OUTPATIENT
Start: 2024-11-21 | End: 2024-12-01

## 2024-11-21 ASSESSMENT — ENCOUNTER SYMPTOMS
PSYCHIATRIC NEGATIVE: 1
NEUROLOGICAL NEGATIVE: 1
DIARRHEA: 1
RESPIRATORY NEGATIVE: 1
ENDOCRINE NEGATIVE: 1
CONSTITUTIONAL NEGATIVE: 1
NAUSEA: 1
CARDIOVASCULAR NEGATIVE: 1
EYES NEGATIVE: 1

## 2024-11-21 NOTE — PROGRESS NOTES
"Subjective     Patient ID: Daryl Win is a 34 y.o. male who presents for Diarrhea and Nausea.  Had a peptic ulcer a while back. Saw GI and was diagnosed with a very large hiatal hernie. Had a repair done by Dr Negron at [facility]. Was back in the ER after with a strange paralysis of his legs.  He was lost to follow-up. During that time he was on Pip/tazo. Since then he has had on and off diarrhea. He admits to taking immodium. Has not sought care until now.    Review of Systems   Constitutional: Negative.    HENT: Negative.     Eyes: Negative.    Respiratory: Negative.     Cardiovascular: Negative.    Gastrointestinal:  Positive for diarrhea and nausea.   Endocrine: Negative.    Genitourinary: Negative.    Skin: Negative.    Neurological: Negative.    Psychiatric/Behavioral: Negative.         Objective     Vitals:    11/21/24 1415   BP: 136/88   BP Location: Right arm   Patient Position: Sitting   Pulse: 105   Resp: 20   Temp: 37.1 °C (98.8 °F)   TempSrc: Temporal   SpO2: 98%   Weight: 88.3 kg (194 lb 9.6 oz)   Height: 1.778 m (5' 10\")        Current Outpatient Medications   Medication Instructions    melatonin 3 mg, Daily PRN    multivitamin tablet 1 tablet, Daily    pantoprazole (PROTONIX) 40 mg, oral, 2 times daily before meals, Do not crush, chew, or split.    sucralfate (CARAFATE) 1 g, oral, 4 times daily    vancomycin (VANCOCIN) 125 mg, oral, 4 times daily        Physical Exam  Constitutional:       Appearance: Normal appearance.   HENT:      Head: Normocephalic and atraumatic.      Right Ear: Tympanic membrane normal.      Left Ear: Tympanic membrane normal.      Nose: Nose normal.      Mouth/Throat:      Mouth: Mucous membranes are moist.   Cardiovascular:      Rate and Rhythm: Normal rate and regular rhythm.   Pulmonary:      Effort: Pulmonary effort is normal.      Breath sounds: Normal breath sounds.   Abdominal:      General: Abdomen is flat. Bowel sounds are normal.      Palpations: Abdomen " is soft.   Neurological:      Mental Status: He is alert.         Assessment/Plan   Diagnoses and all orders for this visit:  PUD (peptic ulcer disease)  -     sucralfate (Carafate) 100 mg/mL suspension; Take 10 mL (1 g) by mouth 4 times a day.  -     CBC and Auto Differential; Future  -     Comprehensive Metabolic Panel; Future  Diarrhea of infectious origin  -     vancomycin (Vancocin) 125 mg capsule; Take 1 capsule (125 mg) by mouth 4 times a day for 10 days.  -     CBC and Auto Differential; Future  -     Comprehensive Metabolic Panel; Future  Upper GI bleed  -     pantoprazole (ProtoNix) 40 mg EC tablet; Take 1 tablet (40 mg) by mouth 2 times a day before meals. Do not crush, chew, or split.  -     CBC and Auto Differential; Future  -     Comprehensive Metabolic Panel; Future  Patient is aware to go to the ER if symptoms worsen.

## 2024-11-22 LAB
ALBUMIN SERPL BCP-MCNC: 4.9 G/DL (ref 3.4–5)
ALP SERPL-CCNC: 67 U/L (ref 33–120)
ALT SERPL W P-5'-P-CCNC: 48 U/L (ref 10–52)
ANION GAP SERPL CALC-SCNC: 17 MMOL/L (ref 10–20)
AST SERPL W P-5'-P-CCNC: 34 U/L (ref 9–39)
BASOPHILS # BLD MANUAL: 0.13 X10*3/UL (ref 0–0.1)
BASOPHILS NFR BLD MANUAL: 2.5 %
BILIRUB SERPL-MCNC: 0.2 MG/DL (ref 0–1.2)
BUN SERPL-MCNC: 11 MG/DL (ref 6–23)
CALCIUM SERPL-MCNC: 9.7 MG/DL (ref 8.6–10.6)
CHLORIDE SERPL-SCNC: 105 MMOL/L (ref 98–107)
CO2 SERPL-SCNC: 27 MMOL/L (ref 21–32)
CREAT SERPL-MCNC: 0.81 MG/DL (ref 0.5–1.3)
EGFRCR SERPLBLD CKD-EPI 2021: >90 ML/MIN/1.73M*2
EOSINOPHIL # BLD MANUAL: 0.05 X10*3/UL (ref 0–0.7)
EOSINOPHIL NFR BLD MANUAL: 0.9 %
ERYTHROCYTE [DISTWIDTH] IN BLOOD BY AUTOMATED COUNT: 20.5 % (ref 11.5–14.5)
GLUCOSE SERPL-MCNC: 90 MG/DL (ref 74–99)
HCT VFR BLD AUTO: 47.6 % (ref 41–52)
HGB BLD-MCNC: 14.5 G/DL (ref 13.5–17.5)
IMM GRANULOCYTES # BLD AUTO: 0.01 X10*3/UL (ref 0–0.7)
IMM GRANULOCYTES NFR BLD AUTO: 0.2 % (ref 0–0.9)
LYMPHOCYTES # BLD MANUAL: 1.64 X10*3/UL (ref 1.2–4.8)
LYMPHOCYTES NFR BLD MANUAL: 32.2 %
MCH RBC QN AUTO: 25.9 PG (ref 26–34)
MCHC RBC AUTO-ENTMCNC: 30.5 G/DL (ref 32–36)
MCV RBC AUTO: 85 FL (ref 80–100)
MONOCYTES # BLD MANUAL: 0.21 X10*3/UL (ref 0.1–1)
MONOCYTES NFR BLD MANUAL: 4.2 %
NEUTROPHILS # BLD MANUAL: 2.29 X10*3/UL (ref 1.2–7.7)
NEUTS BAND # BLD MANUAL: 0.17 X10*3/UL (ref 0–0.7)
NEUTS BAND NFR BLD MANUAL: 3.4 %
NEUTS SEG # BLD MANUAL: 2.12 X10*3/UL (ref 1.2–7)
NEUTS SEG NFR BLD MANUAL: 41.5 %
NRBC BLD-RTO: 0 /100 WBCS (ref 0–0)
PLATELET # BLD AUTO: 568 X10*3/UL (ref 150–450)
POTASSIUM SERPL-SCNC: 4.3 MMOL/L (ref 3.5–5.3)
PROT SERPL-MCNC: 6.9 G/DL (ref 6.4–8.2)
RBC # BLD AUTO: 5.6 X10*6/UL (ref 4.5–5.9)
RBC MORPH BLD: ABNORMAL
SODIUM SERPL-SCNC: 145 MMOL/L (ref 136–145)
TOTAL CELLS COUNTED BLD: 118
VARIANT LYMPHS # BLD MANUAL: 0.78 X10*3/UL (ref 0–0.5)
VARIANT LYMPHS NFR BLD: 15.3 %
WBC # BLD AUTO: 5.1 X10*3/UL (ref 4.4–11.3)

## 2024-12-26 ENCOUNTER — APPOINTMENT (OUTPATIENT)
Facility: CLINIC | Age: 34
End: 2024-12-26
Payer: COMMERCIAL

## 2025-07-08 ENCOUNTER — HOSPITAL ENCOUNTER (EMERGENCY)
Facility: HOSPITAL | Age: 35
Discharge: HOME | End: 2025-07-08

## 2025-07-08 ENCOUNTER — APPOINTMENT (OUTPATIENT)
Dept: RADIOLOGY | Facility: HOSPITAL | Age: 35
End: 2025-07-08

## 2025-07-08 VITALS
OXYGEN SATURATION: 98 % | DIASTOLIC BLOOD PRESSURE: 94 MMHG | BODY MASS INDEX: 26.48 KG/M2 | SYSTOLIC BLOOD PRESSURE: 141 MMHG | TEMPERATURE: 98.2 F | HEIGHT: 70 IN | HEART RATE: 87 BPM | RESPIRATION RATE: 20 BRPM | WEIGHT: 185 LBS

## 2025-07-08 DIAGNOSIS — M25.551 RIGHT HIP PAIN: Primary | ICD-10-CM

## 2025-07-08 PROCEDURE — 99284 EMERGENCY DEPT VISIT MOD MDM: CPT

## 2025-07-08 PROCEDURE — 73502 X-RAY EXAM HIP UNI 2-3 VIEWS: CPT | Mod: RT

## 2025-07-08 PROCEDURE — 2500000004 HC RX 250 GENERAL PHARMACY W/ HCPCS (ALT 636 FOR OP/ED): Mod: JZ

## 2025-07-08 PROCEDURE — 2500000001 HC RX 250 WO HCPCS SELF ADMINISTERED DRUGS (ALT 637 FOR MEDICARE OP)

## 2025-07-08 PROCEDURE — 73502 X-RAY EXAM HIP UNI 2-3 VIEWS: CPT | Mod: RIGHT SIDE | Performed by: RADIOLOGY

## 2025-07-08 PROCEDURE — 96372 THER/PROPH/DIAG INJ SC/IM: CPT

## 2025-07-08 RX ORDER — OXYCODONE HYDROCHLORIDE 5 MG/1
5 TABLET ORAL EVERY 8 HOURS PRN
Qty: 3 TABLET | Refills: 0 | Status: SHIPPED | OUTPATIENT
Start: 2025-07-08 | End: 2025-07-11

## 2025-07-08 RX ORDER — METHYLPREDNISOLONE 4 MG/1
TABLET ORAL
Qty: 21 TABLET | Refills: 0 | Status: SHIPPED | OUTPATIENT
Start: 2025-07-08 | End: 2025-07-14

## 2025-07-08 RX ORDER — DICLOFENAC SODIUM 10 MG/G
4 GEL TOPICAL 3 TIMES DAILY PRN
Qty: 50 G | Refills: 0 | Status: SHIPPED | OUTPATIENT
Start: 2025-07-08

## 2025-07-08 RX ORDER — CYCLOBENZAPRINE HCL 5 MG
5 TABLET ORAL ONCE
Status: COMPLETED | OUTPATIENT
Start: 2025-07-08 | End: 2025-07-08

## 2025-07-08 RX ORDER — KETOROLAC TROMETHAMINE 30 MG/ML
30 INJECTION, SOLUTION INTRAMUSCULAR; INTRAVENOUS ONCE
Status: COMPLETED | OUTPATIENT
Start: 2025-07-08 | End: 2025-07-08

## 2025-07-08 RX ORDER — OXYCODONE HYDROCHLORIDE 5 MG/1
5 TABLET ORAL ONCE
Refills: 0 | Status: COMPLETED | OUTPATIENT
Start: 2025-07-08 | End: 2025-07-08

## 2025-07-08 RX ADMIN — KETOROLAC TROMETHAMINE 30 MG: 30 INJECTION, SOLUTION INTRAMUSCULAR at 17:15

## 2025-07-08 RX ADMIN — CYCLOBENZAPRINE HYDROCHLORIDE 5 MG: 5 TABLET, FILM COATED ORAL at 17:15

## 2025-07-08 RX ADMIN — OXYCODONE HYDROCHLORIDE 5 MG: 5 TABLET ORAL at 18:25

## 2025-07-08 ASSESSMENT — PAIN SCALES - GENERAL: PAINLEVEL_OUTOF10: 10 - WORST POSSIBLE PAIN

## 2025-07-08 ASSESSMENT — PAIN DESCRIPTION - PAIN TYPE: TYPE: ACUTE PAIN

## 2025-07-08 ASSESSMENT — PAIN - FUNCTIONAL ASSESSMENT: PAIN_FUNCTIONAL_ASSESSMENT: 0-10

## 2025-07-08 NOTE — DISCHARGE INSTRUCTIONS
Follow up with orthopedic surgery  Xrays negative which is reassuring.   Can schedule tylenol and ibuprofen intermittently. Would recommend taking daily omeprazole for stomach protection because NSAIDs (ibuprofen and naproxen) and steroids can cause stomach upset and peptic ulcers.

## 2025-07-08 NOTE — ED PROVIDER NOTES
HPI   Chief Complaint   Patient presents with   • Hip Pain     Patient presents to the ED with right hip pain. Patient states that he was pulling weeds and that's when it started to hurt. He went to bed and woke up the next morning and it was intense pain. This has been on going for 3 weeks.        Patient is a 35-year-old male with PMH of PUD presenting today for right hip pain.  States that has been going on for the last 2 weeks, gradually worsening.  States the day of onset, he was outside waiting when it started hurt but he does denies any exact event or trauma to his hip causing the pain.  Pain is described as a sharp pain to the lateral aspect of his hip along the joint.  Occasionally has radiation down his leg but not always.  Denies any back pain, weakness, numbness.  Notes that if he physically moves his leg himself with his hands, he does not have pain.  It is only exacerbated with range of motion.  Has been taking Aleve and using lidocaine patches without relief.  Denies fever, chills, swelling to extremity, chest pain, shortness of breath.            Patient History   Medical History[1]  Surgical History[2]  Family History[3]  Social History[4]    Physical Exam   ED Triage Vitals [07/08/25 1623]   Temperature Heart Rate Respirations BP   36.8 °C (98.2 °F) (!) 117 18 (!) 143/92      Pulse Ox Temp src Heart Rate Source Patient Position   97 % -- Monitor --      BP Location FiO2 (%)     Right arm --       Physical Exam  Vitals and nursing note reviewed.   Constitutional:       General: He is not in acute distress.     Appearance: Normal appearance. He is not ill-appearing.   HENT:      Head: Normocephalic and atraumatic.      Right Ear: External ear normal.      Left Ear: External ear normal.      Nose: Nose normal.      Mouth/Throat:      Mouth: Mucous membranes are moist.   Eyes:      Extraocular Movements: Extraocular movements intact.      Conjunctiva/sclera: Conjunctivae normal.      Pupils: Pupils are  equal, round, and reactive to light.   Cardiovascular:      Rate and Rhythm: Normal rate and regular rhythm.      Heart sounds: Normal heart sounds.   Pulmonary:      Effort: No accessory muscle usage or respiratory distress.      Breath sounds: Normal breath sounds. No wheezing, rhonchi or rales.   Abdominal:      General: Abdomen is flat. Bowel sounds are normal. There is no distension.      Palpations: Abdomen is soft.      Tenderness: There is no abdominal tenderness. There is no right CVA tenderness or left CVA tenderness.   Musculoskeletal:         General: No swelling or deformity. Normal range of motion.      Cervical back: Normal range of motion and neck supple.      Right lower leg: No edema.      Left lower leg: No edema.        Legs:    Skin:     General: Skin is warm and dry.      Capillary Refill: Capillary refill takes less than 2 seconds.   Neurological:      General: No focal deficit present.      Mental Status: He is alert and oriented to person, place, and time.      GCS: GCS eye subscore is 4. GCS verbal subscore is 5. GCS motor subscore is 6.      Cranial Nerves: Cranial nerves 2-12 are intact.      Sensory: No sensory deficit.      Motor: Motor function is intact. No weakness.   Psychiatric:         Mood and Affect: Mood and affect normal.         Speech: Speech normal.         Behavior: Behavior normal. Behavior is cooperative.         ED Course & MDM   ED Course as of 07/08/25 1844   Tue Jul 08, 2025   1803 XR hip right with pelvis when performed 2 or 3 views  No acute bony abnormalities.  There is a transitional vertebrae at the  lumbosacral junction with a slightly sclerotic articulation with the  remainder of the sacrum on the left..   [ML]      ED Course User Index  [ML] Christi Martinez PA-C         Diagnoses as of 07/08/25 1844   Right hip pain                 No data recorded     [place] Coma Scale Score: 15 (07/08/25 1622 : Sofía Landis RN)                           Medical Decision  "Making  Patient is a 35-year-old male presenting today for right hip pain.  Worse with ambulation and active range of motion of the hip.  States that the pain is not present with passive range of motion.  His strength is intact in his bilateral lower extremities.  No numbness, sensation present.  No lower extremity swelling, no hip swelling or overlying erythema or cellulitis.  Mild pain to palpation of the lateral hip the pain is more elicited with active range of motion.  Negative straight leg raise test.  No back pain.  Symptoms more consistent with a muscular strain versus bursitis.  X-rays of the hip/pelvis were obtained revealing no acute bony abnormalities.  He was given Flexeril and Toradol which brought his pain from a 10 out of 10 to a 9 out of 10.  Will plan for the patient to follow-up with orthopedic surgery and trial Medrol Dosepak, muscle relaxers and scheduled ibuprofen/Tylenol.     On arrival of note, incidental finding of heart rate at 117.  He states that he has no chest pain or shortness of breath and he notes that \"that always happens when he is at the doctor's office\".  Per chart review, it is noted that every doctors visit he is mildly tachycardic.  At discharge, heart rate was repeated and downtrending to 87.        Procedure  Procedures         [1]  Past Medical History:  Diagnosis Date   • Asthma    • GERD (gastroesophageal reflux disease)    • Tachycardia 05/22/2024   [2]  Past Surgical History:  Procedure Laterality Date   • WISDOM TOOTH EXTRACTION     [3]  Family History  Problem Relation Name Age of Onset   • Stomach cancer Father     [4]  Social History  Tobacco Use   • Smoking status: Never   • Smokeless tobacco: Never   Vaping Use   • Vaping status: Never Used   Substance Use Topics   • Alcohol use: Yes     Alcohol/week: 3.0 standard drinks of alcohol     Types: 3 Cans of beer per week   • Drug use: Never      Christi Martinez PA-C  07/08/25 [id number]    "

## 2025-07-09 ENCOUNTER — OFFICE VISIT (OUTPATIENT)
Dept: ORTHOPEDIC SURGERY | Facility: CLINIC | Age: 35
End: 2025-07-09

## 2025-07-09 VITALS — BODY MASS INDEX: 26.48 KG/M2 | HEIGHT: 70 IN | WEIGHT: 185 LBS

## 2025-07-09 DIAGNOSIS — S76.011A STRAIN OF HIP FLEXOR, RIGHT, INITIAL ENCOUNTER: Primary | ICD-10-CM

## 2025-07-09 DIAGNOSIS — M25.551 RIGHT HIP PAIN: ICD-10-CM

## 2025-07-09 PROCEDURE — 99212 OFFICE O/P EST SF 10 MIN: CPT | Performed by: FAMILY MEDICINE

## 2025-07-09 PROCEDURE — 99203 OFFICE O/P NEW LOW 30 MIN: CPT | Performed by: FAMILY MEDICINE

## 2025-07-09 PROCEDURE — 1036F TOBACCO NON-USER: CPT | Performed by: FAMILY MEDICINE

## 2025-07-09 PROCEDURE — 3008F BODY MASS INDEX DOCD: CPT | Performed by: FAMILY MEDICINE

## 2025-07-09 NOTE — PROGRESS NOTES
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 male  here with a complaint of right hip pain.  Patient was referred by emergency medicine provider, Christi Martinez.  onset of symptoms around 2 weeks ago, worsening over the past few days.  He points to the anterior aspect of his hip as area of discomfort, no radiation of pain down the leg.  He denies any lateral or posterior hip pain.  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  Around 2 weeks ago he did a lot of repetitive bending picking some weeds on a driveway that was brick which he cannot definitively say is the cause of symptoms but correlates timeline to this.  He has pain when he attempts to lift his leg getting in and out of his car.  Patient did leave work yesterday because of worsening pain, he was seen in the emergency department, x-rays of the hip obtained that were unremarkable, he was given prescription for Medrol Dosepak, oxycodone and some topical Voltaren gel, recommended outpatient orthopedic follow-up.  He felt better last night, woke up this morning with similar pain.  He denies any significant right hip problems in the past.  A few years ago he said that he had some pain down bilateral legs, was hospitalized for a few days, resolved with conservative treatment, no definitive diagnosis was ever made, this pain feels more focal to the right hip.    Medical History[1]    Medication Documentation Review Audit       Reviewed by Rowena Young MA (Medical Assistant) on 11/21/24 at 1415      Medication Order Taking? Sig Documenting Provider Last Dose Status   melatonin 3 mg tablet [id number] Yes Take 1 tablet (3 mg) by mouth once daily as needed. Historical Provider, MD Past Week Active   multivitamin tablet [id number] Yes Take 1 tablet  by mouth once daily. Historical Provider, MD 6/19/2024 Active   pantoprazole (ProtoNix) 40 mg EC tablet [id number] Yes Take 1 tablet (40 mg) by mouth 2 times a day before meals. Do not crush, chew, or split. Frances Lorenzana, APRN-CNP 6/22/2024 Active                    RX Allergies[2]    Social History     Socioeconomic History    Marital status: Single     Spouse name: Not on file    Number of children: Not on file    Years of education: Not on file    Highest education level: Not on file   Occupational History    Not on file   Tobacco Use    Smoking status: Never    Smokeless tobacco: Never   Vaping Use    Vaping status: Never Used   Substance and Sexual Activity    Alcohol use: Yes     Alcohol/week: 3.0 standard drinks of alcohol     Types: 3 Cans of beer per week    Drug use: Never    Sexual activity: Not on file   Other Topics Concern    Not on file   Social History Narrative    Not on file     Social Drivers of Health     Financial Resource Strain: Low Risk  (6/25/2024)    Overall Financial Resource Strain (CARDIA)     Difficulty of Paying Living Expenses: Not hard at all   Food Insecurity: Not on file   Transportation Needs: No Transportation Needs (6/25/2024)    PRAPARE - Transportation     Lack of Transportation (Medical): No     Lack of Transportation (Non-Medical): No   Physical Activity: Not on file   Stress: Not on file   Social Connections: Not on file   Intimate Partner Violence: Not on file   Housing Stability: Low Risk  (6/25/2024)    Housing Stability Vital Sign     Unable to Pay for Housing in the Last Year: No     Number of Places Lived in the Last Year: 1     Unstable Housing in the Last Year: No       Surgical History[3]       Review of Systems   GENERAL: Negative  GI: Negative  MUSCULOSKELETAL: See HPI  SKIN: Negative  NEURO:  Negative     Physical Exam:    General/Constitutional: well appearing, no distress, appears stated age  HEENT: sclera clear  Respiratory: non labored breathing  Vascular:  No edema, swelling or tenderness, except as noted in detailed exam.  Integumentary: No impressive skin lesions present, except as noted in detailed exam.  Neurological:  Alert and oriented   Psychological:  Normal mood and affect.  Musculoskeletal: Normal, except as noted in detailed exam and in HPI    Right hip: Normal appearance, no rotational deformity or leg length discrepancy.  Passive range of motion is within normal limits, there is no pain with passive hip flexion, internal or external rotation.  Active hip flexion with knee extended is difficult, there is weakness, 3+ out of 5 with resisted hip flexion with pain.  There is no significant tenderness to palpation over the anterior hip, no other areas of tenderness palpation at the hip.  He denies any pain with passive hip extension.  He has some pain with resisted hip abduction but no weakness.  Patient can do a single-leg hop on the right with minimal pain       Imaging: X-rays of right hip from 7/8/2025 independently reviewed, no acute abnormalities, normal anatomic alignment, no degenerative changes.      Assessment   1. Strain of hip flexor, right, initial encounter        2. Right hip pain  Referral to Orthopedic Injury Clinic - Same Day Access            Plan: Right anterior hip pain exacerbated by hip flexion, treating as hip flexor strain.  Minimal concern for proximal femoral stress fracture.  No degenerative changes seen on x-ray.  Recommending conservative management.  He does have prescription Medrol Dosepak which he started yesterday, hopefully this will provide good relief as he continues with medications, he was given some home exercises to begin.  He plans to stay out of work for the rest of this week, he will plan to follow-up if symptoms are ongoing despite conservative management.       [1]   Past Medical History:  Diagnosis Date    Asthma     GERD (gastroesophageal reflux disease)     Tachycardia 05/22/2024   [2] No Known Allergies  [3]    Past Surgical History:  Procedure Laterality Date    WISDOM TOOTH EXTRACTION

## 2025-07-09 NOTE — LETTER
July 9, 2025     Christi Martinez PA-C  [address]  Department Of Emergency Medicine  [facility] [address]    Patient: Daryl Win   YOB: 1990   Date of Visit: 7/9/2025       Dear Dr. Christi Martinez PA-C:    Thank you for referring Daryl Win to me for evaluation. Below are my notes for this consultation.  If you have questions, please do not hesitate to call me. I look forward to following your patient along with you.       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 male  here with a complaint of right hip pain.  Patient was referred by emergency medicine provider, Christi Martinez.  onset of symptoms around 2 weeks ago, worsening over the past few days.  He points to the anterior aspect of his hip as area of discomfort, no radiation of pain down the leg.  He denies any lateral or posterior hip pain.  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  Around 2 weeks ago he did a lot of repetitive bending picking some weeds on a driveway that was brick which he cannot definitively say is the cause of symptoms but correlates timeline to this.  He has pain when he attempts to lift his leg getting in and out of his car.  Patient did leave work yesterday because of worsening pain, he was seen in the emergency department, x-rays of the hip obtained that were unremarkable, he was given prescription for Medrol Dosepak, oxycodone and some topical Voltaren gel, recommended outpatient orthopedic follow-up.  He felt better last night, woke up this morning with similar pain.  He denies any significant right hip problems in the past.  A few years ago  he said that he had some pain down bilateral legs, was hospitalized for a few days, resolved with conservative treatment, no definitive diagnosis was ever made, this pain feels more focal to the right hip.    Medical History[1]    Medication Documentation Review Audit       Reviewed by Rowena Young MA (Medical Assistant) on 11/21/24 at 1415      Medication Order Taking? Sig Documenting Provider Last Dose Status   melatonin 3 mg tablet [id number] Yes Take 1 tablet (3 mg) by mouth once daily as needed. Historical Provider, MD Past Week Active   multivitamin tablet [id number] Yes Take 1 tablet by mouth once daily. Historical Provider, MD 6/19/2024 Active   pantoprazole (ProtoNix) 40 mg EC tablet [id number] Yes Take 1 tablet (40 mg) by mouth 2 times a day before meals. Do not crush, chew, or split. Frances Lorenzana, APRN-CNP 6/22/2024 Active                    RX Allergies[2]    Social History     Socioeconomic History   • Marital status: Single     Spouse name: Not on file   • Number of children: Not on file   • Years of education: Not on file   • Highest education level: Not on file   Occupational History   • Not on file   Tobacco Use   • Smoking status: Never   • Smokeless tobacco: Never   Vaping Use   • Vaping status: Never Used   Substance and Sexual Activity   • Alcohol use: Yes     Alcohol/week: 3.0 standard drinks of alcohol     Types: 3 Cans of beer per week   • Drug use: Never   • Sexual activity: Not on file   Other Topics Concern   • Not on file   Social History Narrative   • Not on file     Social Drivers of Health     Financial Resource Strain: Low Risk  (6/25/2024)    Overall Financial Resource Strain (CARDIA)    • Difficulty of Paying Living Expenses: Not hard at all   Food Insecurity: Not on file   Transportation Needs: No Transportation Needs (6/25/2024)    PRAPARE - Transportation    • Lack of Transportation (Medical): No    • Lack of Transportation (Non-Medical): No   Physical Activity: Not on file    Stress: Not on file   Social Connections: Not on file   Intimate Partner Violence: Not on file   Housing Stability: Low Risk  (6/25/2024)    Housing Stability Vital Sign    • Unable to Pay for Housing in the Last Year: No    • Number of Places Lived in the Last Year: 1    • Unstable Housing in the Last Year: No       Surgical History[3]       Review of Systems   GENERAL: Negative  GI: Negative  MUSCULOSKELETAL: See HPI  SKIN: Negative  NEURO:  Negative     Physical Exam:    General/Constitutional: well appearing, no distress, appears stated age  HEENT: sclera clear  Respiratory: non labored breathing  Vascular: No edema, swelling or tenderness, except as noted in detailed exam.  Integumentary: No impressive skin lesions present, except as noted in detailed exam.  Neurological:  Alert and oriented   Psychological:  Normal mood and affect.  Musculoskeletal: Normal, except as noted in detailed exam and in HPI    Right hip: Normal appearance, no rotational deformity or leg length discrepancy.  Passive range of motion is within normal limits, there is no pain with passive hip flexion, internal or external rotation.  Active hip flexion with knee extended is difficult, there is weakness, 3+ out of 5 with resisted hip flexion with pain.  There is no significant tenderness to palpation over the anterior hip, no other areas of tenderness palpation at the hip.  He denies any pain with passive hip extension.  He has some pain with resisted hip abduction but no weakness.  Patient can do a single-leg hop on the right with minimal pain       Imaging: X-rays of right hip from 7/8/2025 independently reviewed, no acute abnormalities, normal anatomic alignment, no degenerative changes.      Assessment   1. Strain of hip flexor, right, initial encounter        2. Right hip pain  Referral to Orthopedic Injury Clinic - Same Day Access            Plan: Right anterior hip pain exacerbated by hip flexion, treating as hip flexor strain.   Minimal concern for proximal femoral stress fracture.  No degenerative changes seen on x-ray.  Recommending conservative management.  He does have prescription Medrol Dosepak which he started yesterday, hopefully this will provide good relief as he continues with medications, he was given some home exercises to begin.  He plans to stay out of work for the rest of this week, he will plan to follow-up if symptoms are ongoing despite conservative management.       [1]  Past Medical History:  Diagnosis Date   • Asthma    • GERD (gastroesophageal reflux disease)    • Tachycardia 05/22/2024   [2]  No Known Allergies  [3]  Past Surgical History:  Procedure Laterality Date   • WISDOM TOOTH EXTRACTION          [1]  Past Medical History:  Diagnosis Date   • Asthma    • GERD (gastroesophageal reflux disease)    • Tachycardia 05/22/2024   [2]  No Known Allergies  [3]  Past Surgical History:  Procedure Laterality Date   • WISDOM TOOTH EXTRACTION

## 2025-07-30 ENCOUNTER — OFFICE VISIT (OUTPATIENT)
Dept: ORTHOPEDIC SURGERY | Facility: CLINIC | Age: 35
End: 2025-07-30

## 2025-07-30 ENCOUNTER — HOSPITAL ENCOUNTER (OUTPATIENT)
Dept: RADIOLOGY | Facility: EXTERNAL LOCATION | Age: 35
Discharge: HOME | End: 2025-07-30

## 2025-07-30 DIAGNOSIS — S76.011D HIP STRAIN, RIGHT, SUBSEQUENT ENCOUNTER: Primary | ICD-10-CM

## 2025-07-30 DIAGNOSIS — M25.551 RIGHT HIP PAIN: ICD-10-CM

## 2025-07-30 PROCEDURE — 99213 OFFICE O/P EST LOW 20 MIN: CPT | Performed by: FAMILY MEDICINE

## 2025-07-30 PROCEDURE — 20611 DRAIN/INJ JOINT/BURSA W/US: CPT | Mod: RT | Performed by: FAMILY MEDICINE

## 2025-07-30 PROCEDURE — 2500000004 HC RX 250 GENERAL PHARMACY W/ HCPCS (ALT 636 FOR OP/ED): Performed by: FAMILY MEDICINE

## 2025-07-30 PROCEDURE — 1036F TOBACCO NON-USER: CPT | Performed by: FAMILY MEDICINE

## 2025-07-30 PROCEDURE — 99212 OFFICE O/P EST SF 10 MIN: CPT | Mod: 25 | Performed by: FAMILY MEDICINE

## 2025-07-30 RX ORDER — TRIAMCINOLONE ACETONIDE 40 MG/ML
40 INJECTION, SUSPENSION INTRA-ARTICULAR; INTRAMUSCULAR
Status: COMPLETED | OUTPATIENT
Start: 2025-07-30 | End: 2025-07-30

## 2025-07-30 RX ORDER — LIDOCAINE HYDROCHLORIDE 20 MG/ML
2 INJECTION, SOLUTION INFILTRATION; PERINEURAL
Status: COMPLETED | OUTPATIENT
Start: 2025-07-30 | End: 2025-07-30

## 2025-07-30 RX ADMIN — TRIAMCINOLONE ACETONIDE 40 MG: 40 INJECTION, SUSPENSION INTRA-ARTICULAR; INTRAMUSCULAR at 11:36

## 2025-07-30 RX ADMIN — LIDOCAINE HYDROCHLORIDE 2 ML: 20 INJECTION, SOLUTION INFILTRATION; PERINEURAL at 11:36

## 2025-07-30 NOTE — PROGRESS NOTES
History of Present Illness   Chief Complaint   Patient presents with    Right Hip - Pain       The patient is 35 y.o. male  here For follow-up of right hip pain.  Patient was seen by me initially 3 weeks ag, see previous note for full details.  In brief patient developed some atraumatic right anterior hip pain around 5 weeks ago, works for [employer], unsure if he aggravated something at work.  He was seen in the emergency department initially, x-rays of the hip were unremarkable, he was referred to me for further evaluation.  At our initial visit I recommended conservative management including taking Medrol Dosepak from ER, home exercise, activity modification, hoping for improvement however he admits to ongoing pain despite conservative treatment over the past 3 weeks.  Has been out of work since that time.  He has no pain at rest, pain with walking, going from sitting to standing, he has pain lifting his leg, difficulty getting in and out of his car.  Pain remains over the anterior aspect of his hip.      Medical History[1]    Medication Documentation Review Audit       Reviewed by Fareed Dan MD (Physician) on 25 at 1052      Medication Order Taking? Sig Documenting Provider Last Dose Status   cyclobenzaprine (Flexeril) tablet 5 mg [id number]   Christi Martinez PA-C   25 171   diclofenac sodium (Voltaren) 1 % gel [id number]  Apply 4.5 inches (4 g) topically 3 times a day as needed (hip pain). Christi Martinez PA-C  Active   ketorolac (Toradol) injection 30 mg [id number]   Christi Martinez PA-C   25 1715   melatonin 3 mg tablet [id number] No Take 1 tablet (3 mg) by mouth once daily as needed. Historical Provider, MD Past Week Active   methylPREDNISolone (Medrol Dospak) 4 mg tablets [id number]  Follow schedule on package instructions Christi Martinez PA-C  Active   multivitamin tablet [id number] No Take 1 tablet by mouth once daily. Historical Provider, MD 2024 Active   oxyCODONE  (Roxicodone) 5 mg immediate release tablet [id number]  Take 1 tablet (5 mg) by mouth every 8 hours if needed for severe pain (7 - 10) for up to 3 days. Christi Martinez PA-C  Active   oxyCODONE (Roxicodone) immediate release tablet 5 mg [id number]   Christi Martinez PA-C   25 1825   pantoprazole (ProtoNix) 40 mg EC tablet [id number]  Take 1 tablet (40 mg) by mouth 2 times a day before meals. Do not crush, chew, or split. Haley Khan, DO  Active   sucralfate (Carafate) 100 mg/mL suspension [id number]  Take 10 mL (1 g) by mouth 4 times a day. Haley Khan, DO  Active                    RX Allergies[2]    Social History     Socioeconomic History    Marital status: Single     Spouse name: Not on file    Number of children: Not on file    Years of education: Not on file    Highest education level: Not on file   Occupational History    Not on file   Tobacco Use    Smoking status: Never    Smokeless tobacco: Never   Vaping Use    Vaping status: Never Used   Substance and Sexual Activity    Alcohol use: Yes     Alcohol/week: 3.0 standard drinks of alcohol     Types: 3 Cans of beer per week    Drug use: Never    Sexual activity: Not on file   Other Topics Concern    Not on file   Social History Narrative    Not on file     Social Drivers of Health     Financial Resource Strain: Low Risk  (2024)    Overall Financial Resource Strain (CARDIA)     Difficulty of Paying Living Expenses: Not hard at all   Food Insecurity: Not on file   Transportation Needs: No Transportation Needs (2024)    PRAPARE - Transportation     Lack of Transportation (Medical): No     Lack of Transportation (Non-Medical): No   Physical Activity: Not on file   Stress: Not on file   Social Connections: Not on file   Intimate Partner Violence: Not on file   Housing Stability: Low Risk  (2024)    Housing Stability Vital Sign     Unable to Pay for Housing in the Last Year: No     Number of Places Lived in the Last Year: 1      Unstable Housing in the Last Year: No       Surgical History[3]       Review of Systems   GENERAL: Negative  GI: Negative  MUSCULOSKELETAL: See HPI  SKIN: Negative  NEURO:  Negative     Physical Exam:    General/Constitutional: well appearing, no distress, appears stated age  HEENT: sclera clear  Respiratory: non labored breathing  Vascular: No edema, swelling or tenderness, except as noted in detailed exam.  Integumentary: No impressive skin lesions present, except as noted in detailed exam.  Neurological:  Alert and oriented   Psychological:  Normal mood and affect.  Musculoskeletal: Normal, except as noted in detailed exam and in HPI    Right hip: Normal appearance, no rotational deformity or leg length discrepancy.  He has good range of motion at the hip however today he does complain of pain with hip flexion and internal rotation that was not present previously.  He has pain and weakness with resisted hip flexion, 3 out of 5 in strength.  No significant tenderness palpation over the anterior or lateral hip.  There is pain with passive hip extension.     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 Consent was given by the patient. Immediately prior to procedure a time out was called to verify the correct patient, procedure, equipment, support staff and site/side marked as required. Patient was prepped and draped in  the usual sterile fashion.             Assessment   1. Hip strain, right, subsequent encounter        2. Right hip pain  Point of Care Ultrasound            Plan: Ongoing right hip pain, appears to be intra-articular component to his pain based on exam findings today, x-ray showed mild cam morphology.  Discussed further workup and treatment.  Patient is currently without insurance, discussed role of MRI but says that he would not be able to afford this without his insurance, given this we did proceed with ultrasound-guided right hip joint injection with Kenalog for both diagnostic and therapeutic purposes, he tolerated without issue, see procedure note for full details.  He plans to reach out to me via [employer] in the next 1 to 2 weeks with response to injection.  Potentially can return to work if symptoms improve.         [1]   Past Medical History:  Diagnosis Date    Anemia End of November 2023    Asthma     GERD (gastroesophageal reflux disease) [id number]    Tachycardia 05/22/2024   [2] No Known Allergies  [3]   Past Surgical History:  Procedure Laterality Date    WISDOM TOOTH EXTRACTION

## 2025-08-27 ENCOUNTER — OFFICE VISIT (OUTPATIENT)
Dept: ORTHOPEDIC SURGERY | Facility: CLINIC | Age: 35
End: 2025-08-27

## 2025-08-27 VITALS — HEIGHT: 70 IN | BODY MASS INDEX: 26.48 KG/M2 | WEIGHT: 185 LBS

## 2025-08-27 DIAGNOSIS — M25.552 LEFT HIP PAIN: ICD-10-CM

## 2025-08-27 DIAGNOSIS — S76.011D HIP STRAIN, RIGHT, SUBSEQUENT ENCOUNTER: Primary | ICD-10-CM

## 2025-08-27 DIAGNOSIS — M25.551 RIGHT HIP PAIN: ICD-10-CM

## 2025-08-27 PROCEDURE — 3008F BODY MASS INDEX DOCD: CPT | Performed by: FAMILY MEDICINE

## 2025-08-27 PROCEDURE — 99214 OFFICE O/P EST MOD 30 MIN: CPT | Performed by: FAMILY MEDICINE

## 2025-08-27 PROCEDURE — 99212 OFFICE O/P EST SF 10 MIN: CPT | Performed by: FAMILY MEDICINE

## 2025-08-27 RX ORDER — MELOXICAM 15 MG/1
15 TABLET ORAL DAILY
Qty: 30 TABLET | Refills: 0 | Status: SHIPPED | OUTPATIENT
Start: 2025-08-27 | End: 2025-09-26

## (undated) DEVICE — GRASPER,FORCE, BIPOLAR, DAVINCI XI

## (undated) DEVICE — DRAPE, SHEET, THREE QUARTER, FAN FOLD, 57 X 77 IN

## (undated) DEVICE — SYNCHROSEAL, IS 4000, 8 MM

## (undated) DEVICE — ADHESIVE, SKIN, LIQUIBAND EXCEED

## (undated) DEVICE — TUBING SET, BIFURCATED, SMOKE EVAC, ACTIVATED CHARCOAL FILTERED, F/AIRSEAL

## (undated) DEVICE — SYRINGE, 20 CC, LUER SLIP

## (undated) DEVICE — KIT, LAP GASTRIC BYPASS, CUSTOM, PARMA

## (undated) DEVICE — SEAL, UNIVERSAL 5-8MM  XI

## (undated) DEVICE — MARKER, SKIN, REGULAR TIP, W/FLEXI-RULER

## (undated) DEVICE — APPLICATOR, CHLORAPREP, W/ORANGE TINT, 26ML

## (undated) DEVICE — SYRINGE, 10 CC, LUER LOCK

## (undated) DEVICE — CONNECTOR, 5 IN 1, STERILE

## (undated) DEVICE — PROTECTOR, HEEL/ANKLE/ELBOW, UNIVERSAL

## (undated) DEVICE — DRAIN, PENROSE, 0.25 X 18 IN, LATEX, STERILE

## (undated) DEVICE — Device

## (undated) DEVICE — CANNULA, AIRSEAL, CAP & OBTURATOR 8MM

## (undated) DEVICE — GOWN, ASTOUND, XL

## (undated) DEVICE — FORCEPS, CADIERE, DAVINCI XI

## (undated) DEVICE — CATHETER TRAY, SURESTEP, 16FR, URINE METER W/STATLOCK

## (undated) DEVICE — DRIVER, MEGA NEEDLE

## (undated) DEVICE — SLEEVE, VASO PRESS, CALF GARMENT, MEDIUM, GREEN